# Patient Record
Sex: MALE | Race: WHITE | NOT HISPANIC OR LATINO | Employment: FULL TIME | URBAN - METROPOLITAN AREA
[De-identification: names, ages, dates, MRNs, and addresses within clinical notes are randomized per-mention and may not be internally consistent; named-entity substitution may affect disease eponyms.]

---

## 2018-10-21 ENCOUNTER — HOSPITAL ENCOUNTER (INPATIENT)
Facility: HOSPITAL | Age: 28
LOS: 3 days | Discharge: HOME | DRG: 897 | End: 2018-10-24
Attending: PSYCHIATRY & NEUROLOGY | Admitting: PSYCHIATRY & NEUROLOGY
Payer: COMMERCIAL

## 2018-10-21 ENCOUNTER — HOSPITAL ENCOUNTER (EMERGENCY)
Facility: OTHER | Age: 28
Discharge: PSYCHIATRIC OR SUBSTANCE USE FACILITY | End: 2018-10-21
Attending: EMERGENCY MEDICINE
Payer: COMMERCIAL

## 2018-10-21 VITALS
TEMPERATURE: 98 F | DIASTOLIC BLOOD PRESSURE: 70 MMHG | WEIGHT: 175 LBS | HEART RATE: 88 BPM | RESPIRATION RATE: 18 BRPM | HEIGHT: 73 IN | BODY MASS INDEX: 23.19 KG/M2 | OXYGEN SATURATION: 98 % | SYSTOLIC BLOOD PRESSURE: 120 MMHG

## 2018-10-21 DIAGNOSIS — F19.94 SUBSTANCE INDUCED MOOD DISORDER: ICD-10-CM

## 2018-10-21 DIAGNOSIS — F19.959 SUBSTANCE-INDUCED PSYCHOTIC DISORDER: ICD-10-CM

## 2018-10-21 DIAGNOSIS — F10.20 ALCOHOL USE DISORDER, MODERATE, IN CONTROLLED ENVIRONMENT: ICD-10-CM

## 2018-10-21 DIAGNOSIS — F41.9 ANXIETY: ICD-10-CM

## 2018-10-21 DIAGNOSIS — G47.00 INSOMNIA, UNSPECIFIED TYPE: ICD-10-CM

## 2018-10-21 DIAGNOSIS — F22 PARANOIA: Primary | ICD-10-CM

## 2018-10-21 DIAGNOSIS — B20 HIV DISEASE: Chronic | ICD-10-CM

## 2018-10-21 DIAGNOSIS — F31.9 BIPOLAR 1 DISORDER: ICD-10-CM

## 2018-10-21 DIAGNOSIS — F29 PSYCHOSIS: ICD-10-CM

## 2018-10-21 LAB
ALBUMIN SERPL BCP-MCNC: 4.5 G/DL
ALP SERPL-CCNC: 78 U/L
ALT SERPL W/O P-5'-P-CCNC: 22 U/L
AMPHET+METHAMPHET UR QL: NEGATIVE
ANION GAP SERPL CALC-SCNC: 6 MMOL/L
APAP SERPL-MCNC: <3 UG/ML
AST SERPL-CCNC: 15 U/L
BARBITURATES UR QL SCN>200 NG/ML: NEGATIVE
BASOPHILS # BLD AUTO: 0.02 K/UL
BASOPHILS NFR BLD: 0.4 %
BENZODIAZ UR QL SCN>200 NG/ML: NEGATIVE
BILIRUB SERPL-MCNC: 0.4 MG/DL
BILIRUB UR QL STRIP: NEGATIVE
BUN SERPL-MCNC: 17 MG/DL
BZE UR QL SCN: NEGATIVE
CALCIUM SERPL-MCNC: 10 MG/DL
CANNABINOIDS UR QL SCN: NEGATIVE
CHLORIDE SERPL-SCNC: 107 MMOL/L
CLARITY UR: CLEAR
CO2 SERPL-SCNC: 27 MMOL/L
COLOR UR: YELLOW
CREAT SERPL-MCNC: 1 MG/DL
CREAT UR-MCNC: 14.1 MG/DL
DIFFERENTIAL METHOD: ABNORMAL
EOSINOPHIL # BLD AUTO: 0.1 K/UL
EOSINOPHIL NFR BLD: 1.7 %
ERYTHROCYTE [DISTWIDTH] IN BLOOD BY AUTOMATED COUNT: 12.4 %
EST. GFR  (AFRICAN AMERICAN): >60 ML/MIN/1.73 M^2
EST. GFR  (NON AFRICAN AMERICAN): >60 ML/MIN/1.73 M^2
ETHANOL SERPL-MCNC: 133 MG/DL
ETHANOL SERPL-MCNC: 48 MG/DL
GLUCOSE SERPL-MCNC: 102 MG/DL
GLUCOSE UR QL STRIP: NEGATIVE
HCT VFR BLD AUTO: 43.8 %
HGB BLD-MCNC: 15.7 G/DL
HGB UR QL STRIP: NEGATIVE
KETONES UR QL STRIP: NEGATIVE
LEUKOCYTE ESTERASE UR QL STRIP: NEGATIVE
LYMPHOCYTES # BLD AUTO: 1.8 K/UL
LYMPHOCYTES NFR BLD: 33.6 %
MCH RBC QN AUTO: 32.2 PG
MCHC RBC AUTO-ENTMCNC: 35.8 G/DL
MCV RBC AUTO: 90 FL
METHADONE UR QL SCN>300 NG/ML: NEGATIVE
MONOCYTES # BLD AUTO: 0.7 K/UL
MONOCYTES NFR BLD: 12.1 %
NEUTROPHILS # BLD AUTO: 2.8 K/UL
NEUTROPHILS NFR BLD: 51.8 %
NITRITE UR QL STRIP: NEGATIVE
OPIATES UR QL SCN: NEGATIVE
PCP UR QL SCN>25 NG/ML: NEGATIVE
PH UR STRIP: 6 [PH] (ref 5–8)
PLATELET # BLD AUTO: 187 K/UL
PMV BLD AUTO: 9.4 FL
POTASSIUM SERPL-SCNC: 4 MMOL/L
PROT SERPL-MCNC: 7.6 G/DL
PROT UR QL STRIP: NEGATIVE
RBC # BLD AUTO: 4.87 M/UL
SALICYLATES SERPL-MCNC: <5 MG/DL
SODIUM SERPL-SCNC: 140 MMOL/L
SP GR UR STRIP: <=1.005 (ref 1–1.03)
TOXICOLOGY INFORMATION: ABNORMAL
TSH SERPL DL<=0.005 MIU/L-ACNC: 0.89 UIU/ML
URN SPEC COLLECT METH UR: ABNORMAL
UROBILINOGEN UR STRIP-ACNC: NEGATIVE EU/DL
WBC # BLD AUTO: 5.45 K/UL

## 2018-10-21 PROCEDURE — 85025 COMPLETE CBC W/AUTO DIFF WBC: CPT

## 2018-10-21 PROCEDURE — 25000003 PHARM REV CODE 250: Performed by: EMERGENCY MEDICINE

## 2018-10-21 PROCEDURE — 84443 ASSAY THYROID STIM HORMONE: CPT

## 2018-10-21 PROCEDURE — 93005 ELECTROCARDIOGRAM TRACING: CPT

## 2018-10-21 PROCEDURE — 80307 DRUG TEST PRSMV CHEM ANLYZR: CPT

## 2018-10-21 PROCEDURE — 99285 EMERGENCY DEPT VISIT HI MDM: CPT | Mod: 25

## 2018-10-21 PROCEDURE — 12400001 HC PSYCH SEMI-PRIVATE ROOM

## 2018-10-21 PROCEDURE — 80329 ANALGESICS NON-OPIOID 1 OR 2: CPT

## 2018-10-21 PROCEDURE — 81003 URINALYSIS AUTO W/O SCOPE: CPT | Mod: 59

## 2018-10-21 PROCEDURE — 80320 DRUG SCREEN QUANTALCOHOLS: CPT | Mod: 91

## 2018-10-21 PROCEDURE — 93010 ELECTROCARDIOGRAM REPORT: CPT | Mod: ,,, | Performed by: INTERNAL MEDICINE

## 2018-10-21 PROCEDURE — 80053 COMPREHEN METABOLIC PANEL: CPT

## 2018-10-21 RX ORDER — DIAZEPAM 5 MG/1
5 TABLET ORAL
Status: COMPLETED | OUTPATIENT
Start: 2018-10-21 | End: 2018-10-21

## 2018-10-21 RX ORDER — OLANZAPINE 10 MG/1
10 TABLET ORAL EVERY 6 HOURS PRN
Status: DISCONTINUED | OUTPATIENT
Start: 2018-10-21 | End: 2018-10-24 | Stop reason: HOSPADM

## 2018-10-21 RX ORDER — OLANZAPINE 10 MG/2ML
10 INJECTION, POWDER, FOR SOLUTION INTRAMUSCULAR EVERY 6 HOURS PRN
Status: DISCONTINUED | OUTPATIENT
Start: 2018-10-21 | End: 2018-10-24 | Stop reason: HOSPADM

## 2018-10-21 RX ORDER — DIPHENHYDRAMINE HCL 25 MG
50 CAPSULE ORAL
Status: COMPLETED | OUTPATIENT
Start: 2018-10-21 | End: 2018-10-21

## 2018-10-21 RX ORDER — NICOTINE 7MG/24HR
1 PATCH, TRANSDERMAL 24 HOURS TRANSDERMAL DAILY
Status: DISCONTINUED | OUTPATIENT
Start: 2018-10-22 | End: 2018-10-24 | Stop reason: HOSPADM

## 2018-10-21 RX ORDER — FOLIC ACID 1 MG/1
1 TABLET ORAL DAILY
Status: DISCONTINUED | OUTPATIENT
Start: 2018-10-22 | End: 2018-10-24 | Stop reason: HOSPADM

## 2018-10-21 RX ORDER — OLANZAPINE 10 MG/1
10 TABLET ORAL NIGHTLY
Status: ON HOLD | COMMUNITY
End: 2018-10-24 | Stop reason: HOSPADM

## 2018-10-21 RX ADMIN — DIAZEPAM 5 MG: 5 TABLET ORAL at 01:10

## 2018-10-21 RX ADMIN — DIAZEPAM 5 MG: 5 TABLET ORAL at 07:10

## 2018-10-21 RX ADMIN — DIPHENHYDRAMINE HYDROCHLORIDE 50 MG: 25 CAPSULE ORAL at 04:10

## 2018-10-21 NOTE — ED TRIAGE NOTES
Pt to ED stating he is having a panic attack. Also reports suicidal thoughts x 5 days. Denies any plan. Pt with flat affect, calm and cooperative.

## 2018-10-21 NOTE — ED NOTES
PT belongings:    Pair of jeans [employer] sunglasses  smartphone  Green velvet shirt  Black wallet with [place] ID, VISA, insurance and other misc cards  Pair of brown loafers  [employer] tabitha car key

## 2018-10-21 NOTE — ED NOTES
"Pt updated on POC and pt verbalized understanding. Pt appears to be anxious and states, " im just nervous." MD notified.  "

## 2018-10-21 NOTE — ED NOTES
Completed hourly rounding on pt. ED sitter Inna at bedside for direct patient observation performing q 15 min checks per psych protocol. Psych protocol continued, pt in paper scrubs and non-skid socks, no harmful objects in room, pt remains free from injury. Pt low fowlers in stretcher, resting with eyes closed, respirations unlabored with visible chest rise and fall, NAD noted. Bed locked and in low position, side rails x 2. WCTM.

## 2018-10-21 NOTE — ED NOTES
Pt remains in a safe in clutter free environment. Pt is AAOx4. RR are even and unlabored. Skin is warm and dry. Inna Mason, remains at the bedside with direct observation of the pt and performing q 15 min safety checks

## 2018-10-21 NOTE — ED NOTES
PEC received in CPT. Will begin actively seeking inpatient psych placement once pt is medically cleared

## 2018-10-21 NOTE — ED PROVIDER NOTES
"Encounter Date: 10/21/2018    SCRIBE #1 NOTE: I, Kevin Marin, am scribing for, and in the presence of, Dr. Arango.       History     Chief Complaint   Patient presents with    Suicidal     Pt states, "I'm having a panic attack for the past couple of days." Also reports being suicial. Taking Zyprexa at home.      Time seen by provider: 12:53 PM    This is a 28 y.o. male, with history of HIV, who presents with complaint of panic attacks starting five days ago. He reports that he has not left his home in five days because of fear of what will happen if he does leave. He also experiences palpitations and auditory hallucinations telling him not to leave his home. He reports that while at home, he tries his best to sleep. He has passive suicidal ideations, with no plan in place. He denies thought of overdose. He denies fever, chills, rhinorrhea, SOB, CP, abdominal pain, N/V/D, any urinary symptoms, back pain, headache, or weakness. He attempted suicide approximately 1.5 years ago, where he cut his wrists. He reports that he was hospitalized on 8-5-18 for similar symptoms, where he experienced uncontrollable thoughts and panic attacks. He denies any recent illness. He believes that his olanzapine has stopped working. He also takes Zyprexa. He has not taken any Xanax recently. He also takes medicine for HIV.     He states that he is scared to talk to anyone about his anxiety. He has a roommate who is never home. His parents live in [place], but they are currently out of town in [place]. He also has a friend in [place]. He last saw his psychiatrist, Dr. Rowley, in [place] a few weeks ago. He has not sought out resources in [place]. 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 He reports ETOH use last night, but no illicit drug use.          The history is provided by the patient.     Review of patient's allergies indicates:  No Known " Allergies  Past Medical History:   Diagnosis Date    Bipolar 1 disorder     PHU (generalized anxiety disorder)     Panic disorder      History reviewed. No pertinent surgical history.  No family history on file.  Social History     Tobacco Use    Smoking status: Current Some Day Smoker     Types: Cigarettes    Smokeless tobacco: Never Used   Substance Use Topics    Alcohol use: Yes     Comment: occasionally     Drug use: No     Review of Systems   Constitutional: Negative for chills and fever.   HENT: Negative for rhinorrhea.    Respiratory: Negative for shortness of breath.    Cardiovascular: Positive for palpitations. Negative for chest pain and leg swelling.   Gastrointestinal: Negative for abdominal pain, diarrhea, nausea and vomiting.   Genitourinary: Negative for decreased urine volume, difficulty urinating, dysuria, enuresis, frequency, hematuria and urgency.   Musculoskeletal: Negative for back pain.   Skin: Negative for color change, pallor and rash.   Neurological: Negative for weakness and headaches.   Psychiatric/Behavioral: Positive for hallucinations (Auditory) and suicidal ideas. Negative for self-injury. The patient is nervous/anxious.         Positive for paranoia.       Physical Exam     Initial Vitals [10/21/18 1223]   BP Pulse Resp Temp SpO2   136/75 (!) 113 18 97.9 °F (36.6 °C) 96 %      MAP       --         Physical Exam    Nursing note and vitals reviewed.  Constitutional: He appears well-developed and well-nourished. He is not diaphoretic. He appears distressed.   Anxious.   HENT:   Head: Normocephalic and atraumatic.   Eyes: Conjunctivae and EOM are normal. No scleral icterus.   Neck: Normal range of motion.   Cardiovascular: Regular rhythm and normal heart sounds. Exam reveals no gallop and no friction rub.    No murmur heard.  tachycardic   Pulmonary/Chest: No respiratory distress.   Musculoskeletal: Normal range of motion. He exhibits no edema.   Neurological: He is alert and  oriented to person, place, and time.   Skin: Skin is warm and dry. No rash noted. No erythema. No pallor.   Psychiatric: His speech is normal. His mood appears anxious. Thought content is paranoid. Cognition and memory are normal. He expresses impulsivity. He exhibits a depressed mood. He expresses suicidal ideation.   Paranoid. Positive auditory hallucinations. Passive thoughts of self harm, anxcious         ED Course   Procedures  Labs Reviewed   CBC W/ AUTO DIFFERENTIAL - Abnormal; Notable for the following components:       Result Value    MCH 32.2 (*)     All other components within normal limits   COMPREHENSIVE METABOLIC PANEL - Abnormal; Notable for the following components:    Anion Gap 6 (*)     All other components within normal limits   URINALYSIS, REFLEX TO URINE CULTURE - Abnormal; Notable for the following components:    Specific Gravity, UA <=1.005 (*)     All other components within normal limits    Narrative:     Preferred Collection Type->Urine, Clean Catch   DRUG SCREEN PANEL, URINE EMERGENCY - Abnormal; Notable for the following components:    Creatinine, Random Ur 14.1 (*)     All other components within normal limits    Narrative:     Preferred Collection Type->Urine, Clean Catch   ALCOHOL,MEDICAL (ETHANOL) - Abnormal; Notable for the following components:    Alcohol, Medical, Serum 133 (*)     All other components within normal limits   ACETAMINOPHEN LEVEL - Abnormal; Notable for the following components:    Acetaminophen (Tylenol), Serum <3.0 (*)     All other components within normal limits   SALICYLATE LEVEL - Abnormal; Notable for the following components:    Salicylate Lvl <5.0 (*)     All other components within normal limits   TSH     EKG Readings: (Independently Interpreted)   Normal sinus rhythm at 79 bpm. No STEMI. Normal axis.       Imaging Results    None          Medical Decision Making:   Initial Assessment:   29 yo M with unclear psych hx- on Zyprexa, w chronic anxiety, here  "with increasing paranoia, now avoiding leaving his house for last week, + auditory hallucinations, and thoughts of "doing anything to end it all". + palpitations, denies illicits substance use. No visual hallucinations. + hx of psych hospitalizations in the past. Currently denying having friends or family to confide in. I am concerned about pt's ability to care for oneself, now w disorganized paranoid behavior. Will plan to medically clear for psych transfer.   Clinical Tests:   Medical Tests: Ordered and Reviewed  ED Management:  Pt medically cleared, will PEC given concern for grave disability w concern for suicidal thoughts without safe discharge.            Scribe Attestation:   Scribe #1: I performed the above scribed service and the documentation accurately describes the services I performed. I attest to the accuracy of the note.    Attending Attestation:           Physician Attestation for Scribe:  Physician Attestation Statement for Scribe #1: I, Dr. Arango, reviewed documentation, as scribed by Kevin Marin in my presence, and it is both accurate and complete.                    Clinical Impression:     1. Paranoia    2. Anxiety            Disposition:   Disposition: Transferred  Condition: Serious                        Lynn Arango MD  10/21/18 1520    "

## 2018-10-21 NOTE — ED NOTES
Pt remains in a safe in clutter free environment. Pt is AAOx4. RR are even and unlabored. Skin is warm and dry. Inna Mason, remains at the bedside with direct observation of the pt and performing q 15 min safety checks.

## 2018-10-21 NOTE — ED NOTES
Pt has ETO is not < 100. Notified ED RN that pt must have ETO <100 in order to seek placement. Please re draw ETO level.

## 2018-10-21 NOTE — ED NOTES
Pt remains in a safe in clutter free environment. Pt appears to be less anxious than earlier.  Pt is AAOx4. RR are even and unlabored. Skin is warm and dry. Inna Mason, remains at the bedside with direct observation of the pt and performing q 15 min safety checks

## 2018-10-22 PROBLEM — B20 HIV DISEASE: Chronic | Status: ACTIVE | Noted: 2018-10-22

## 2018-10-22 PROBLEM — F10.20 ALCOHOL USE DISORDER, MODERATE, IN CONTROLLED ENVIRONMENT: Status: ACTIVE | Noted: 2018-10-22

## 2018-10-22 PROBLEM — G47.00 INSOMNIA: Status: ACTIVE | Noted: 2018-10-22

## 2018-10-22 PROBLEM — F31.9 BIPOLAR 1 DISORDER: Status: ACTIVE | Noted: 2018-10-22

## 2018-10-22 PROBLEM — Z21 HIV (HUMAN IMMUNODEFICIENCY VIRUS INFECTION): Chronic | Status: ACTIVE | Noted: 2018-10-22

## 2018-10-22 PROBLEM — F41.9 ANXIETY: Status: ACTIVE | Noted: 2018-10-22

## 2018-10-22 LAB
CHOLEST SERPL-MCNC: 227 MG/DL
CHOLEST/HDLC SERPL: 5.7 {RATIO}
ESTIMATED AVG GLUCOSE: 91 MG/DL
HBA1C MFR BLD HPLC: 4.8 %
HDLC SERPL-MCNC: 40 MG/DL
HDLC SERPL: 17.6 %
LDLC SERPL CALC-MCNC: 141.4 MG/DL
NONHDLC SERPL-MCNC: 187 MG/DL
TRIGL SERPL-MCNC: 228 MG/DL

## 2018-10-22 PROCEDURE — 25000003 PHARM REV CODE 250: Performed by: PSYCHIATRY & NEUROLOGY

## 2018-10-22 PROCEDURE — 83036 HEMOGLOBIN GLYCOSYLATED A1C: CPT

## 2018-10-22 PROCEDURE — 99223 1ST HOSP IP/OBS HIGH 75: CPT | Mod: ,,, | Performed by: PSYCHIATRY & NEUROLOGY

## 2018-10-22 PROCEDURE — 99223 1ST HOSP IP/OBS HIGH 75: CPT | Mod: ,,, | Performed by: INTERNAL MEDICINE

## 2018-10-22 PROCEDURE — 90853 GROUP PSYCHOTHERAPY: CPT | Mod: ,,, | Performed by: PSYCHOLOGIST

## 2018-10-22 PROCEDURE — 80061 LIPID PANEL: CPT

## 2018-10-22 PROCEDURE — 86361 T CELL ABSOLUTE COUNT: CPT

## 2018-10-22 PROCEDURE — 36415 COLL VENOUS BLD VENIPUNCTURE: CPT

## 2018-10-22 PROCEDURE — 86592 SYPHILIS TEST NON-TREP QUAL: CPT

## 2018-10-22 PROCEDURE — 25000003 PHARM REV CODE 250: Performed by: INTERNAL MEDICINE

## 2018-10-22 PROCEDURE — 87536 HIV-1 QUANT&REVRSE TRNSCRPJ: CPT

## 2018-10-22 PROCEDURE — S4991 NICOTINE PATCH NONLEGEND: HCPCS | Performed by: STUDENT IN AN ORGANIZED HEALTH CARE EDUCATION/TRAINING PROGRAM

## 2018-10-22 PROCEDURE — 25000003 PHARM REV CODE 250: Performed by: STUDENT IN AN ORGANIZED HEALTH CARE EDUCATION/TRAINING PROGRAM

## 2018-10-22 PROCEDURE — 12400001 HC PSYCH SEMI-PRIVATE ROOM

## 2018-10-22 RX ORDER — DARUNAVIR 800 MG/1
800 TABLET, FILM COATED ORAL
Status: DISCONTINUED | OUTPATIENT
Start: 2018-10-22 | End: 2018-10-24 | Stop reason: HOSPADM

## 2018-10-22 RX ORDER — RAMELTEON 8 MG/1
8 TABLET ORAL NIGHTLY PRN
Status: DISCONTINUED | OUTPATIENT
Start: 2018-10-22 | End: 2018-10-24 | Stop reason: HOSPADM

## 2018-10-22 RX ORDER — TENOFOVIR DISOPROXIL FUMARATE 300 MG/1
300 TABLET, FILM COATED ORAL DAILY
Status: DISCONTINUED | OUTPATIENT
Start: 2018-10-22 | End: 2018-10-24 | Stop reason: HOSPADM

## 2018-10-22 RX ORDER — DARUNAVIR 800 MG/1
800 TABLET, FILM COATED ORAL
Status: CANCELLED | OUTPATIENT
Start: 2018-10-23

## 2018-10-22 RX ORDER — MIRTAZAPINE 15 MG/1
15 TABLET, ORALLY DISINTEGRATING ORAL NIGHTLY
Status: DISCONTINUED | OUTPATIENT
Start: 2018-10-22 | End: 2018-10-24 | Stop reason: HOSPADM

## 2018-10-22 RX ORDER — RITONAVIR 100 MG/1
100 TABLET ORAL
Status: DISCONTINUED | OUTPATIENT
Start: 2018-10-22 | End: 2018-10-24 | Stop reason: HOSPADM

## 2018-10-22 RX ORDER — LORAZEPAM 1 MG/1
2 TABLET ORAL EVERY 4 HOURS PRN
Status: DISCONTINUED | OUTPATIENT
Start: 2018-10-22 | End: 2018-10-24 | Stop reason: HOSPADM

## 2018-10-22 RX ORDER — EMTRICITABINE 200 MG/1
200 CAPSULE ORAL DAILY
Status: DISCONTINUED | OUTPATIENT
Start: 2018-10-22 | End: 2018-10-24 | Stop reason: HOSPADM

## 2018-10-22 RX ADMIN — DARUNAVIR 800 MG: 800 TABLET, FILM COATED ORAL at 07:10

## 2018-10-22 RX ADMIN — RAMELTEON 8 MG: 8 TABLET, FILM COATED ORAL at 01:10

## 2018-10-22 RX ADMIN — EMTRICITABINE 200 MG: 200 CAPSULE ORAL at 07:10

## 2018-10-22 RX ADMIN — THERA TABS 1 TABLET: TAB at 09:10

## 2018-10-22 RX ADMIN — PREGABALIN 150 MG: 100 CAPSULE ORAL at 12:10

## 2018-10-22 RX ADMIN — RITONAVIR 100 MG: 100 TABLET, FILM COATED ORAL at 07:10

## 2018-10-22 RX ADMIN — PREGABALIN 150 MG: 100 CAPSULE ORAL at 08:10

## 2018-10-22 RX ADMIN — NICOTINE 1 PATCH: 7 PATCH, EXTENDED RELEASE TRANSDERMAL at 09:10

## 2018-10-22 RX ADMIN — OLANZAPINE 15 MG: 5 TABLET, FILM COATED ORAL at 08:10

## 2018-10-22 RX ADMIN — MIRTAZAPINE 15 MG: 15 TABLET, ORALLY DISINTEGRATING ORAL at 08:10

## 2018-10-22 RX ADMIN — FOLIC ACID 1 MG: 1 TABLET ORAL at 09:10

## 2018-10-22 RX ADMIN — TENOFOVIR DISOPROXIL FUMARATE 300 MG: 300 TABLET, FILM COATED ORAL at 08:10

## 2018-10-22 NOTE — CONSULTS
[facility]  Infectious Disease  Consult Note    Patient Name: Flako Pearce  MRN: [id number]  Admission Date: 10/21/2018  Hospital Length of Stay: 1 days  Attending Physician: Andrew Andesron MD  Primary Care Provider: Primary Doctor No     Isolation Status: No active isolations    Patient information was obtained from patient and ER records.      Consults  Assessment/Plan:     HIV (human immunodeficiency virus infection)    28M hx HIV admitted to psych for evaluation of anxiety. ID consulted to resume home HAART    Recommendations  - Have re-ordered emtricitabine and tenofovir  - restarted darunavir, cobisistat not available so will use ritonavir as boosting agent instead  - on discharge, patient should resume his home descovy and prezcobix   - If he plans to move to [place], recommend arranging follow up w/ Dr. Rogelio Jung [address] [place] [address] [address], [place] [address] - [phone]  - If patient stays in [place], he is welcome to follow up in our clinic for further HIV care  - will check CD4, VL and RPR + FTA while inpatient    ID will sign off, please call w/ questions         Thank you for your consult. I will sign off. Please contact us if you have any additional questions.    Radha Walker,   Infectious Disease  [facility]    Subjective:     Principal Problem: Bipolar 1 disorder    HPI: 28M PMH HIV and alcohol abuse who presented to ER w/ paranoia and worsening anxiety. He was admitted to psych for further evaluation. He endorses intermittent compliance w/ his medications, he does not currently have an HIV provider, and plans to move to [place] after leaving the hospital. He recently started drinking again.      Past Medical History:   Diagnosis Date    Bipolar 1 disorder     PHU (generalized anxiety disorder)     Panic disorder        History reviewed. No pertinent surgical history.    Review of patient's allergies indicates:  No Known  Allergies    Medications:  Medications Prior to Admission   Medication Sig    darunavir-cobicistat (PREZCOBIX) 800-150 mg-mg Tab Take by mouth.    emtricitabine-tenofovir alafen (DESCOVY) 200-25 mg Tab Take by mouth.    OLANZapine (ZYPREXA) 10 MG tablet Take 10 mg by mouth every evening.     Antibiotics (From admission, onward)    None        Antifungals (From admission, onward)    None        Antivirals (From admission, onward)        Stop Route Frequency     emtricitabine      -- Oral Daily     tenofovir      -- Oral Daily     darunavir ethanolate      -- Oral With breakfast     ritonavir      -- Oral With breakfast             There is no immunization history on file for this patient.    Family History     None        Social History     Socioeconomic History    Marital status: Single     Spouse name: None    Number of children: None    Years of education: None    Highest education level: None   Social Needs    Financial resource strain: None    Food insecurity - worry: None    Food insecurity - inability: None    Transportation needs - medical: None    Transportation needs - non-medical: None   Occupational History    None   Tobacco Use    Smoking status: Current Some Day Smoker     Types: Cigarettes    Smokeless tobacco: Never Used   Substance and Sexual Activity    Alcohol use: Yes     Comment: occasionally     Drug use: No    Sexual activity: None   Other Topics Concern    None   Social History Narrative    None     Review of Systems   Constitutional: Negative for activity change, appetite change, chills and fever.   HENT: Negative for congestion, dental problem, ear pain and rhinorrhea.    Eyes: Negative for pain and discharge.   Respiratory: Negative for cough and shortness of breath.    Cardiovascular: Negative for chest pain and leg swelling.   Gastrointestinal: Negative for abdominal distention, abdominal pain, constipation, diarrhea, nausea and vomiting.   Genitourinary: Negative for  difficulty urinating and dysuria.   Musculoskeletal: Negative for arthralgias, back pain and joint swelling.   Skin: Negative for rash and wound.   Neurological: Negative for dizziness, light-headedness and headaches.   Psychiatric/Behavioral: Negative for behavioral problems and confusion.     Objective:     Vital Signs (Most Recent):  Temp: 97.9 °F (36.6 °C) (10/22/18 0750)  Pulse: 92 (10/22/18 0750)  Resp: 18 (10/22/18 0750)  BP: 127/80 (10/22/18 0750)  SpO2: 98 % (10/22/18 0111) Vital Signs (24h Range):  Temp:  [97.9 °F (36.6 °C)-98.1 °F (36.7 °C)] 97.9 °F (36.6 °C)  Pulse:  [77-92] 92  Resp:  [18] 18  SpO2:  [98 %] 98 %  BP: (120-129)/(70-80) 127/80     Weight: 84.8 kg (186 lb 15.2 oz)  Body mass index is 24.67 kg/m².    Estimated Creatinine Clearance: 125.4 mL/min (based on SCr of 1 mg/dL).    Physical Exam   Constitutional: He is oriented to person, place, and time. He appears well-developed and well-nourished. No distress.   HENT:   Head: Atraumatic.   Right Ear: External ear normal.   Left Ear: External ear normal.   Nose: Nose normal.   Mouth/Throat: Oropharynx is clear and moist.   Eyes: EOM are normal.   Neck: Normal range of motion. Neck supple.   Cardiovascular: Normal rate, regular rhythm and normal heart sounds.   No murmur heard.  Pulmonary/Chest: Effort normal and breath sounds normal. No respiratory distress. He has no wheezes.   Abdominal: Soft. Bowel sounds are normal. He exhibits no distension. There is no tenderness.   Musculoskeletal: Normal range of motion. He exhibits no edema or deformity.   Neurological: He is alert and oriented to person, place, and time. No cranial nerve deficit.   Skin: Skin is warm and dry. No rash noted. He is not diaphoretic. No erythema.   Psychiatric: He has a normal mood and affect. His behavior is normal.       Significant Labs:   C4 Count: No results for input(s): C4 in the last 48 hours.  CBC:   Recent Labs   Lab 10/21/18  1322   WBC 5.45   HGB 15.7   HCT 43.8         CMP:   Recent Labs   Lab 10/21/18  1322      K 4.0      CO2 27      BUN 17   CREATININE 1.0   CALCIUM 10.0   PROT 7.6   ALBUMIN 4.5   BILITOT 0.4   ALKPHOS 78   AST 15   ALT 22   ANIONGAP 6*   EGFRNONAA >60       Significant Imaging: I have reviewed all pertinent imaging results/findings within the past 24 hours.

## 2018-10-22 NOTE — ED NOTES
Car information given to  Security at this time. Pt is parked in the [facility] 2nd floor

## 2018-10-22 NOTE — HPI
Inpatient Psychiatry History & Physical      Chief Complaint / Reason for Consult:     suicidal ideation and anxiety     Subjective:     History of Present Illness:   Flako Pearce is a 28 y.o. male with a history of panic attacks, bipolar I disorder, and PHU who presented to [facility] due to panic attacks, anxiety, SI, depression.     Per ED MD:  This is a 28 y.o. male, with history of HIV, who presents with complaint of panic attacks starting five days ago. He reports that he has not left his home in five days because of fear of what will happen if he does leave. He also experiences palpitations and auditory hallucinations telling him not to leave his home. He reports that while at home, he tries his best to sleep. He has passive suicidal ideations, with no plan in place. He denies thought of overdose. He denies fever, chills, rhinorrhea, SOB, CP, abdominal pain, N/V/D, any urinary symptoms, back pain, headache, or weakness. He attempted suicide approximately 1.5 years ago, where he cut his wrists. He reports that he was hospitalized on 8-5-18 for similar symptoms, where he experienced uncontrollable thoughts and panic attacks. He denies any recent illness. He believes that his olanzapine has stopped working. He also takes Zyprexa. He has not taken any Xanax recently. He also takes medicine for HIV.      He states that he is scared to talk to anyone about his anxiety. He has a roommate who is never home. His parents live in [place], but they are currently out of town in [place]. He also has a friend in [place]. He last saw his psychiatrist, Dr. Rowley, in [place] a few weeks ago. He has not sought out resources in [place]. 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 He reports ETOH use last night, but no illicit drug use.    Inpatient interview:  Pt originally came to the ED at [facility] for help with his anxiety and panic attacks.  Pt  last had a psychiatrist in [place], was on Klonopin daily and zyprexa 15 mg nightly.  Pt has been out of his benzos for months, self medicating with alcohol.  Pt states his last manic episode was three weeks ago, had decreased need for sleep, spent a lot of money on shopping.  Pt reports that he had auditory hallucinations a few months ago when he could not sleep, states the voices are his friends, does not currently endorse.  Pt denies current suicidality, states he felt that way earlier in the day due to the overwhelming anxiety.  Pt has had two previous suicide attempts, both by cutting his wrists. Pt currently complains of insomnia, no physical complaints.      Collateral:   none    Medical Review Of Systems:  Pertinent items are noted in HPI.    Psychiatric Review Of Systems - Is patient experiencing or having changes in:  sleep: yes  appetite: no  weight: no  energy/anergy: yes  interest/pleasure/anhedonia: yes  somatic symptoms: yes  libido: no  anxiety/panic: yes  guilty/hopelessness: yes  concentration: no  S.I.B.s/risky behavior: no  any drugs: no  alcohol: yes     Allergies:  Patient has no known allergies.    Past Medical/Surgical History  Past Medical History:   Diagnosis Date    Bipolar 1 disorder     PHU (generalized anxiety disorder)     Panic disorder      History reviewed. No pertinent surgical history.    Past Psychiatric History:  Previous Medication Trials: yes   Previous Psychiatric Hospitalizations: yes   Previous Suicide Attempts: yes x 2, most recent 1.5 years ago  History of Violence: no  Outpatient Psychiatrist: no, not currently in [place]    Social History:  Marital Status: not   Children: 0   Employment Status/Info: recently quit his job, plans to move back in with his parents  Education: college graduate  Special Ed: no  Housing Status: in apartment with friend  History of phys/sexual abuse: no  Access to gun: unknown    Substance Abuse History:  Recreational Drugs:  denies  Use of Alcohol: moderate  Rehab History: no   Tobacco Use: no  Use of Caffeine: denies use  Use of OTC: denies  Legal consequences of chemical use: no    Legal History:  Past Charges/Incarcerations: no,     Pending charges: no     Family Psychiatric History:   denies    Objective:     Current Medications:  Infusions:    Scheduled:   folic acid  1 mg Oral Daily    multivitamin  1 tablet Oral Daily    nicotine  1 patch Transdermal Daily     PRN:  OLANZapine **AND** OLANZapine, ramelteon    Home Medications:  Prior to Admission medications    Medication Sig Start Date End Date Taking? Authorizing Provider   darunavir-cobicistat (PREZCOBIX) 800-150 mg-mg Tab Take by mouth.    Historical Provider, MD   emtricitabine-tenofovir alafen (DESCOVY) 200-25 mg Tab Take by mouth.    Historical Provider, MD   OLANZapine (ZYPREXA) 10 MG tablet Take 10 mg by mouth every evening.    Historical Provider, MD     Vital Signs:  Temp:  [97.9 °F (36.6 °C)-98.1 °F (36.7 °C)]   Pulse:  []   Resp:  [15-18]   BP: (120-136)/(70-75)   SpO2:  [96 %-98 %]     Physical Exam:  Gen: Alert, calm, cooperative, NAD   Head: NCAT, PERRL, EOMI, MMM   Lungs: CTAB, respirations unlabored   Chest wall: No tenderness or deformity   Heart: RRR, S1/S2 normal, no M/R/G   Abdomen: S/NT/ND, +BS, no HSM, no masses   Extremities: Extremities normal, atraumatic, no cyanosis or edema   Pulses: 2+ and symmetric all extremities   Skin: Skin color, texture, turgor normal; no rashes or lesions   Neurologic: CN II-XII grossly intact, normal strength, sensations and reflexes throughout     Mental Status Exam:  Appearance: unremarkable, age appropriate   Behavior: normal, cooperative, restless and fidgety    Speech/Language: normal tone, normal rate, normal pitch, normal volume   Mood: euthymic   Affect: mood congruent   Thought Process:  normal and logical   Thought Content: normal, no suicidality, no homicidality, delusions, or paranoia   Orientation:  grossly intact   Cognition: grossly intact   Insight: fair   Judgment: fair

## 2018-10-22 NOTE — CONSULTS
Consult received. Full note to follow. Please call for questions.    Thanks,    Blanca Walker DO  Infectious Disease Fellow  P: [phone]

## 2018-10-22 NOTE — PLAN OF CARE
10/22/18 1500   [facility]   Group Name Education   Specific Interventions Coping Skills Training   Participation Level Minimal;Sharing   Participation Quality Cooperative;Lack of Interest   Insight/Motivation Limited   Affect/Mood Display Appropriate   Cognition Oriented

## 2018-10-22 NOTE — ED NOTES
Pt resting in bed comfortably at this time eyes closed and respirations even and unlabored, bathroom needs addressed and comfort situated. PEC in place and one to one direct observation maintained with Claritza LICONA at this time. Pt medically cleared and awaiting transport to [facility] via [facility] . Will be available for pt's needs and will continue to monitor pt.

## 2018-10-22 NOTE — SUBJECTIVE & OBJECTIVE
Patient History           Medical as of 10/22/2018     Past Medical History     Diagnosis Date Comments Source    Bipolar 1 disorder -- -- Patient    PHU (generalized anxiety disorder) -- -- Patient    Panic disorder -- -- Patient                  Surgical as of 10/22/2018    Past Surgical History: Patient provided no pertinent surgical history.           Family as of 10/22/2018    None           Tobacco Use as of 10/22/2018     Smoking Status Smoking Start Date Smoking Quit Date Packs/Day Years Used    Current Some Day Smoker -- -- -- --    Types Comments Smokeless Tobacco Status Smokeless Tobacco Quit Date Source     Cigarettes -- Never Used -- Provider            Alcohol Use as of 10/22/2018     Alcohol Use Drinks/Week Alcohol/Week Comments Source    Yes -- -- occasionally  Provider    Frequency Standard Drinks Binge Drinking Source      -- -- -- Provider             Drug Use as of 10/22/2018     Drug Use Types Frequency Comments Source    No -- -- -- Provider            Sexual Activity as of 10/22/2018     Sexually Active Birth Control Partners Comments Source    -- -- -- -- Provider            Activities of Daily Living as of 10/22/2018    None           Social Documentation as of 10/22/2018    None           Occupational as of 10/22/2018    None           Socioeconomic as of 10/22/2018     Marital Status Spouse Name Number of Children Years Education Education Level Preferred Language Ethnicity Race Source    Single -- -- -- -- English /White White --    Financial Resource Strain Food Insecurity: Worry Food Insecurity: Inability Transportation Needs: Medical Transportation Needs: Non-medical       -- -- -- -- --             Pertinent History     Question Response Comments    Lives with -- --    Place in Birth Order -- --    Lives in -- --    Number of Siblings -- --    Raised by -- --    Legal Involvement -- --    Childhood Trauma -- --    Criminal History of -- --    Financial Status -- --     Highest Level of Education -- --    Does patient have access to a firearm? -- --     Service -- --    Primary Leisure Activity -- --    Spirituality -- --        Past Medical History:   Diagnosis Date    Bipolar 1 disorder     PHU (generalized anxiety disorder)     Panic disorder      History reviewed. No pertinent surgical history.  Family History     None        Tobacco Use    Smoking status: Current Some Day Smoker     Types: Cigarettes    Smokeless tobacco: Never Used   Substance and Sexual Activity    Alcohol use: Yes     Comment: occasionally     Drug use: No    Sexual activity: Not on file     Review of patient's allergies indicates:  No Known Allergies    Current Facility-Administered Medications on File Prior to Encounter   Medication    [COMPLETED] diazePAM tablet 5 mg    [COMPLETED] diazePAM tablet 5 mg    [COMPLETED] diphenhydrAMINE capsule 50 mg     Current Outpatient Medications on File Prior to Encounter   Medication Sig    darunavir-cobicistat (PREZCOBIX) 800-150 mg-mg Tab Take by mouth.    emtricitabine-tenofovir alafen (DESCOVY) 200-25 mg Tab Take by mouth.    OLANZapine (ZYPREXA) 10 MG tablet Take 10 mg by mouth every evening.     Psychotherapeutics (From admission, onward)    Start     Stop Route Frequency Ordered    10/22/18 0209  ramelteon tablet 8 mg      -- Oral Nightly PRN 10/22/18 0109    10/21/18 2333  OLANZapine tablet 10 mg  (Olanzapine)      -- Oral Every 6 hours PRN 10/21/18 2333    10/21/18 2333  OLANZapine injection 10 mg  (Olanzapine)      -- IM Every 6 hours PRN 10/21/18 2333        Review of Systems  Strengths and Liabilities: Strength: Patient is expressive/articulate., Strength: Patient is intelligent., Liability: Patient is impulsive., Liability: Patient has poor judgment    Objective:     Vital Signs (Most Recent):    Vital Signs (24h Range):  Temp:  [97.9 °F (36.6 °C)-98.1 °F (36.7 °C)] 98.1 °F (36.7 °C)  Pulse:  [] 88  Resp:  [15-18] 18  SpO2:   [96 %-98 %] 98 %  BP: (120-136)/(70-75) 120/70           There is no height or weight on file to calculate BMI.    No intake or output data in the 24 hours ending 10/22/18 0134    Physical Exam   Psychiatric:   Appearance: unremarkable, age appropriate  Behavior: normal, cooperative, restless and fidgety   Speech/Language: normal tone, normal rate, normal pitch, normal volume  Mood: euthymic  Affect: mood congruent  Thought Process:  normal and logical  Thought Content: normal, no suicidality, no homicidality, delusions, or paranoia  Orientation: grossly intact  Cognition: grossly intact  Insight: fair  Judgment: fair          Significant Labs:   Last 24 Hours:   Recent Lab Results       10/21/18  1649   10/21/18  1322        Benzodiazepines   Negative     Methadone metabolites   Negative     Phencyclidine   Negative     Acetaminophen (Tylenol), Serum   <3.0  Comment:  Toxic Levels:  Adults (4 hr post-ingestion).........>150 ug/mL  Adults (12 hr post-ingestion)........>40 ug/mL  Peds (2 hr post-ingestion, liquid)...>225 ug/mL       Albumin   4.5     Alcohol, Medical, Serum 48 133     Alkaline Phosphatase   78     ALT   22     Amphetamine Screen, Ur   Negative     Anion Gap   6     Appearance, UA   Clear     AST   15     Barbiturate Screen, Ur   Negative     Baso #   0.02     Basophil%   0.4     Bilirubin (UA)   Negative     Total Bilirubin   0.4  Comment:  For infants and newborns, interpretation of results should be based  on gestational age, weight and in agreement with clinical  observations.  Premature Infant recommended reference ranges:  Up to 24 hours.............<8.0 mg/dL  Up to 48 hours............<12.0 mg/dL  3-5 days..................<15.0 mg/dL  6-29 days.................<15.0 mg/dL       BUN, Bld   17     Calcium   10.0     Chloride   107     CO2   27     Cocaine (Metab.)   Negative     Color, UA   Yellow     Creatinine   1.0     Creatinine, Random Ur   14.1  Comment:  The random urine reference ranges  provided were established   for 24 hour urine collections.  No reference ranges exist for  random urine specimens.  Correlate clinically.       Differential Method   Automated     eGFR if    >60     eGFR if non    >60  Comment:  Calculation used to obtain the estimated glomerular filtration  rate (eGFR) is the CKD-EPI equation.        Eos #   0.1     Eosinophil%   1.7     Glucose   102     Glucose, UA   Negative     Gran # (ANC)   2.8     Gran%   51.8     Hematocrit   43.8     Hemoglobin   15.7     Ketones, UA   Negative     Leukocytes, UA   Negative     Lymph #   1.8     Lymph%   33.6     MCH   32.2     MCHC   35.8     MCV   90     Mono #   0.7     Mono%   12.1     MPV   9.4     Nitrite, UA   Negative     Occult Blood UA   Negative     Opiate Scrn, Ur   Negative     pH, UA   6.0     Platelets   187     Potassium   4.0     Total Protein   7.6     Protein, UA   Negative  Comment:  Recommend a 24 hour urine protein or a urine   protein/creatinine ratio if globulin induced proteinuria is  clinically suspected.       RBC   4.87     RDW   12.4     Salicylate Lvl   <5.0  Comment:  Toxic:  30.0 - 70.0 mg/dl  Lethal: >70.0 mg/dl       Sodium   140     Specific Gravity, UA   <=1.005     Specimen UA   Urine, Clean Catch     Marijuana (THC) Metabolite   Negative     Toxicology Information   SEE COMMENT  Comment:  This screen includes the following classes of drugs at the   listed cut-off:  Benzodiazepines                  200 ng/ml  Methadone                        300 ng/ml  Cocaine metabolite               300 ng/ml  Opiates                          300 ng/ml  Barbiturates                     200 ng/ml  Amphetamines                    1000 ng/ml  Marijuana metabs (THC)            50 ng/ml  Phencyclidine (PCP)               25 ng/ml  High concentrations of Diphenhydramine may cross-react with  Phencyclidine PCP screening immunoassay giving a false   positive result.  High concentrations of  Methylenedioxymethamphetamine (MDMA aka  Ectasy) and other structurally similar compounds may cross-   react with the Amphetamine/Methamphetamine screening   immunoassay giving a false positive result.  A metabolite of the anti-HIV drug Sustiva () may cause  false positive results in the Marijuana metabolite (THC)   screening assay.  Note: This exception list includes only more common   interferants in toxicology screen testing.  Because of many   cross-reactantspositive results on toxicology drug screens   should be confirmed whenever results do not correlate with   clinical presentation.  This report is intended for use in clinical monitoring and  management of patients. It is not intended for use in   employment related drug testing.  Because of any cross-reactants, positive results on toxicology  drug screens should be confirmed whenever results do not  correlate with clinical presentation.  Presumptive positive results are unconfirmed and may be used   only for medical purposes.       TSH   0.891     Urobilinogen, UA   Negative     WBC   5.45           Significant Imaging: None

## 2018-10-22 NOTE — PROGRESS NOTES
Group Psychotherapy (PhD/LCSW)    Site: [facility]    Clinical status of patient: Inpatient    Date: 10/22/2018    Group Focus: Life Skills    Length of service: 53702 - 35-40 minutes    Number of patients in attendance: 8    Referred by: Acute Psychiatry Unit Treatment Team    Target symptoms: Alcohol Abuse, Anxiety and Mood Disorder    Patient's response to treatment: Minimal participation     Progress toward goals: Minimal progress    Interval History: Pt attended for entire time, but didn't actively participate. He appeared sad and inattentive.     Diagnosis: Bipolar d/o, Anxiety, Alcohol dependence    Plan: Continue treatment on APU

## 2018-10-22 NOTE — ASSESSMENT & PLAN NOTE
28M hx HIV admitted to psych for evaluation of anxiety. ID consulted to resume home HAART    Recommendations  - Have re-ordered emtricitabine and tenofovir  - restarted darunavir, cobisistat not available so will use ritonavir as boosting agent instead  - on discharge, patient should resume his home descovy and prezcobix   - If he plans to move to [place], recommend arranging follow up w/ Dr. Rogelio Jung [address] [place] [address] [address], [place] [address] - [phone]  - If patient stays in [place], he is welcome to follow up in our clinic for further HIV care  - will check CD4, VL and RPR + FTA while inpatient    ID will sign off, please call w/ questions

## 2018-10-22 NOTE — ASSESSMENT & PLAN NOTE
- pt endorses panic attacks and PHU  - states only things that work are benzos, was previously on ativan and klonopin, has been out of klonopin for 3 months  - pt has been self medicating with etoh  - defer meds to primary team

## 2018-10-22 NOTE — ED NOTES
Patient accepted to [facility] . Accepting Md is Dr. Go. Call report to [phone] when admit orders completed.

## 2018-10-22 NOTE — H&P
[facility]  Psychiatry  History & Physical    Patient Name: Flako Pearce  MRN: [id number]   Code Status: Full Code  Admission Date: 10/21/2018  Attending Physician: Daniel Go MD   Primary Care Provider: Primary Doctor No    Current Legal Status: [facility]    Patient information was obtained from patient and ER records.     Subjective:     Principal Problem:Bipolar 1 disorder    Chief Complaint:  Panic attacks     HPI:   Inpatient Psychiatry History & Physical      Chief Complaint / Reason for Consult:     suicidal ideation and anxiety     Subjective:     History of Present Illness:   Flako Pearce is a 28 y.o. male with a history of panic attacks, bipolar I disorder, and PHU who presented to [facility] due to panic attacks, anxiety, SI, depression.     Per ED MD:  This is a 28 y.o. male, with history of HIV, who presents with complaint of panic attacks starting five days ago. He reports that he has not left his home in five days because of fear of what will happen if he does leave. He also experiences palpitations and auditory hallucinations telling him not to leave his home. He reports that while at home, he tries his best to sleep. He has passive suicidal ideations, with no plan in place. He denies thought of overdose. He denies fever, chills, rhinorrhea, SOB, CP, abdominal pain, N/V/D, any urinary symptoms, back pain, headache, or weakness. He attempted suicide approximately 1.5 years ago, where he cut his wrists. He reports that he was hospitalized on 8-5-18 for similar symptoms, where he experienced uncontrollable thoughts and panic attacks. He denies any recent illness. He believes that his olanzapine has stopped working. He also takes Zyprexa. He has not taken any Xanax recently. He also takes medicine for HIV.      He states that he is scared to talk to anyone about his anxiety. He has a roommate who is never home. His parents live in [place], but they are currently out of town in  [place]. He also has a friend in [place]. He last saw his psychiatrist, Dr. Rowley, in [place] a few weeks ago. He has not sought out resources in [place]. 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 He reports ETOH use last night, but no illicit drug use.    Inpatient interview:  Pt originally came to the ED at [facility] for help with his anxiety and panic attacks.  Pt last had a psychiatrist in [place], was on Klonopin daily and zyprexa 15 mg nightly.  Pt has been out of his benzos for months, self medicating with alcohol.  Pt states his last manic episode was three weeks ago, had decreased need for sleep, spent a lot of money on shopping.  Pt reports that he had auditory hallucinations a few months ago when he could not sleep, states the voices are his friends, does not currently endorse.  Pt denies current suicidality, states he felt that way earlier in the day due to the overwhelming anxiety.  Pt has had two previous suicide attempts, both by cutting his wrists. Pt currently complains of insomnia, no physical complaints.      Collateral:   none    Medical Review Of Systems:  Pertinent items are noted in HPI.    Psychiatric Review Of Systems - Is patient experiencing or having changes in:  sleep: yes  appetite: no  weight: no  energy/anergy: yes  interest/pleasure/anhedonia: yes  somatic symptoms: yes  libido: no  anxiety/panic: yes  guilty/hopelessness: yes  concentration: no  S.I.B.s/risky behavior: no  any drugs: no  alcohol: yes     Allergies:  Patient has no known allergies.    Past Medical/Surgical History  Past Medical History:   Diagnosis Date    Bipolar 1 disorder     PHU (generalized anxiety disorder)     Panic disorder      History reviewed. No pertinent surgical history.    Past Psychiatric History:  Previous Medication Trials: yes   Previous Psychiatric Hospitalizations: yes   Previous Suicide Attempts: yes x 2, most  recent 1.5 years ago  History of Violence: no  Outpatient Psychiatrist: no, not currently in [place]    Social History:  Marital Status: not   Children: 0   Employment Status/Info: recently quit his job, plans to move back in with his parents  Education: college graduate  Special Ed: no  Housing Status: in apartment with friend  History of phys/sexual abuse: no  Access to gun: unknown    Substance Abuse History:  Recreational Drugs: denies  Use of Alcohol: moderate  Rehab History: no   Tobacco Use: no  Use of Caffeine: denies use  Use of OTC: denies  Legal consequences of chemical use: no    Legal History:  Past Charges/Incarcerations: no,     Pending charges: no     Family Psychiatric History:   denies    Objective:     Current Medications:  Infusions:    Scheduled:   folic acid  1 mg Oral Daily    multivitamin  1 tablet Oral Daily    nicotine  1 patch Transdermal Daily     PRN:  OLANZapine **AND** OLANZapine, ramelteon    Home Medications:  Prior to Admission medications    Medication Sig Start Date End Date Taking? Authorizing Provider   darunavir-cobicistat (PREZCOBIX) 800-150 mg-mg Tab Take by mouth.    Historical Provider, MD   emtricitabine-tenofovir alafen (DESCOVY) 200-25 mg Tab Take by mouth.    Historical Provider, MD   OLANZapine (ZYPREXA) 10 MG tablet Take 10 mg by mouth every evening.    Historical Provider, MD     Vital Signs:  Temp:  [97.9 °F (36.6 °C)-98.1 °F (36.7 °C)]   Pulse:  []   Resp:  [15-18]   BP: (120-136)/(70-75)   SpO2:  [96 %-98 %]     Physical Exam:  Gen: Alert, calm, cooperative, NAD   Head: NCAT, PERRL, EOMI, MMM   Lungs: CTAB, respirations unlabored   Chest wall: No tenderness or deformity   Heart: RRR, S1/S2 normal, no M/R/G   Abdomen: S/NT/ND, +BS, no HSM, no masses   Extremities: Extremities normal, atraumatic, no cyanosis or edema   Pulses: 2+ and symmetric all extremities   Skin: Skin color, texture, turgor normal; no rashes or lesions   Neurologic: CN II-XII  grossly intact, normal strength, sensations and reflexes throughout     Mental Status Exam:  Appearance: unremarkable, age appropriate   Behavior: normal, cooperative, restless and fidgety    Speech/Language: normal tone, normal rate, normal pitch, normal volume   Mood: euthymic   Affect: mood congruent   Thought Process:  normal and logical   Thought Content: normal, no suicidality, no homicidality, delusions, or paranoia   Orientation: grossly intact   Cognition: grossly intact   Insight: fair   Judgment: fair            Patient History           Medical as of 10/22/2018     Past Medical History     Diagnosis Date Comments Source    Bipolar 1 disorder -- -- Patient    PHU (generalized anxiety disorder) -- -- Patient    Panic disorder -- -- Patient                  Surgical as of 10/22/2018    Past Surgical History: Patient provided no pertinent surgical history.           Family as of 10/22/2018    None           Tobacco Use as of 10/22/2018     Smoking Status Smoking Start Date Smoking Quit Date Packs/Day Years Used    Current Some Day Smoker -- -- -- --    Types Comments Smokeless Tobacco Status Smokeless Tobacco Quit Date Source     Cigarettes -- Never Used -- Provider            Alcohol Use as of 10/22/2018     Alcohol Use Drinks/Week Alcohol/Week Comments Source    Yes -- -- occasionally  Provider    Frequency Standard Drinks Binge Drinking Source      -- -- -- Provider             Drug Use as of 10/22/2018     Drug Use Types Frequency Comments Source    No -- -- -- Provider            Sexual Activity as of 10/22/2018     Sexually Active Birth Control Partners Comments Source    -- -- -- -- Provider            Activities of Daily Living as of 10/22/2018    None           Social Documentation as of 10/22/2018    None           Occupational as of 10/22/2018    None           Socioeconomic as of 10/22/2018     Marital Status Spouse Name Number of Children Years Education Education Level Preferred Language  Ethnicity Race Source    Single -- -- -- -- English /White White --    Financial Resource Strain Food Insecurity: Worry Food Insecurity: Inability Transportation Needs: Medical Transportation Needs: Non-medical       -- -- -- -- --             Pertinent History     Question Response Comments    Lives with -- --    Place in Birth Order -- --    Lives in -- --    Number of Siblings -- --    Raised by -- --    Legal Involvement -- --    Childhood Trauma -- --    Criminal History of -- --    Financial Status -- --    Highest Level of Education -- --    Does patient have access to a firearm? -- --     Service -- --    Primary Leisure Activity -- --    Spirituality -- --        Past Medical History:   Diagnosis Date    Bipolar 1 disorder     PHU (generalized anxiety disorder)     Panic disorder      History reviewed. No pertinent surgical history.  Family History     None        Tobacco Use    Smoking status: Current Some Day Smoker     Types: Cigarettes    Smokeless tobacco: Never Used   Substance and Sexual Activity    Alcohol use: Yes     Comment: occasionally     Drug use: No    Sexual activity: Not on file     Review of patient's allergies indicates:  No Known Allergies    Current Facility-Administered Medications on File Prior to Encounter   Medication    [COMPLETED] diazePAM tablet 5 mg    [COMPLETED] diazePAM tablet 5 mg    [COMPLETED] diphenhydrAMINE capsule 50 mg     Current Outpatient Medications on File Prior to Encounter   Medication Sig    darunavir-cobicistat (PREZCOBIX) 800-150 mg-mg Tab Take by mouth.    emtricitabine-tenofovir alafen (DESCOVY) 200-25 mg Tab Take by mouth.    OLANZapine (ZYPREXA) 10 MG tablet Take 10 mg by mouth every evening.     Psychotherapeutics (From admission, onward)    Start     Stop Route Frequency Ordered    10/22/18 0209  ramelteon tablet 8 mg      -- Oral Nightly PRN 10/22/18 0109    10/21/18 0531  OLANZapine tablet 10 mg  (Olanzapine)      --  Oral Every 6 hours PRN 10/21/18 2333    10/21/18 2333  OLANZapine injection 10 mg  (Olanzapine)      -- IM Every 6 hours PRN 10/21/18 2333        Review of Systems  Strengths and Liabilities: Strength: Patient is expressive/articulate., Strength: Patient is intelligent., Liability: Patient is impulsive., Liability: Patient has poor judgment    Objective:     Vital Signs (Most Recent):    Vital Signs (24h Range):  Temp:  [97.9 °F (36.6 °C)-98.1 °F (36.7 °C)] 98.1 °F (36.7 °C)  Pulse:  [] 88  Resp:  [15-18] 18  SpO2:  [96 %-98 %] 98 %  BP: (120-136)/(70-75) 120/70           There is no height or weight on file to calculate BMI.    No intake or output data in the 24 hours ending 10/22/18 0134    Physical Exam   Psychiatric:   Appearance: unremarkable, age appropriate  Behavior: normal, cooperative, restless and fidgety   Speech/Language: normal tone, normal rate, normal pitch, normal volume  Mood: euthymic  Affect: mood congruent  Thought Process:  normal and logical  Thought Content: normal, no suicidality, no homicidality, delusions, or paranoia  Orientation: grossly intact  Cognition: grossly intact  Insight: fair  Judgment: fair          Significant Labs:   Last 24 Hours:   Recent Lab Results       10/21/18  1649   10/21/18  1322        Benzodiazepines   Negative     Methadone metabolites   Negative     Phencyclidine   Negative     Acetaminophen (Tylenol), Serum   <3.0  Comment:  Toxic Levels:  Adults (4 hr post-ingestion).........>150 ug/mL  Adults (12 hr post-ingestion)........>40 ug/mL  Peds (2 hr post-ingestion, liquid)...>225 ug/mL       Albumin   4.5     Alcohol, Medical, Serum 48 133     Alkaline Phosphatase   78     ALT   22     Amphetamine Screen, Ur   Negative     Anion Gap   6     Appearance, UA   Clear     AST   15     Barbiturate Screen, Ur   Negative     Baso #   0.02     Basophil%   0.4     Bilirubin (UA)   Negative     Total Bilirubin   0.4  Comment:  For infants and newborns, interpretation  of results should be based  on gestational age, weight and in agreement with clinical  observations.  Premature Infant recommended reference ranges:  Up to 24 hours.............<8.0 mg/dL  Up to 48 hours............<12.0 mg/dL  3-5 days..................<15.0 mg/dL  6-29 days.................<15.0 mg/dL       BUN, Bld   17     Calcium   10.0     Chloride   107     CO2   27     Cocaine (Metab.)   Negative     Color, UA   Yellow     Creatinine   1.0     Creatinine, Random Ur   14.1  Comment:  The random urine reference ranges provided were established   for 24 hour urine collections.  No reference ranges exist for  random urine specimens.  Correlate clinically.       Differential Method   Automated     eGFR if    >60     eGFR if non    >60  Comment:  Calculation used to obtain the estimated glomerular filtration  rate (eGFR) is the CKD-EPI equation.        Eos #   0.1     Eosinophil%   1.7     Glucose   102     Glucose, UA   Negative     Gran # (ANC)   2.8     Gran%   51.8     Hematocrit   43.8     Hemoglobin   15.7     Ketones, UA   Negative     Leukocytes, UA   Negative     Lymph #   1.8     Lymph%   33.6     MCH   32.2     MCHC   35.8     MCV   90     Mono #   0.7     Mono%   12.1     MPV   9.4     Nitrite, UA   Negative     Occult Blood UA   Negative     Opiate Scrn, Ur   Negative     pH, UA   6.0     Platelets   187     Potassium   4.0     Total Protein   7.6     Protein, UA   Negative  Comment:  Recommend a 24 hour urine protein or a urine   protein/creatinine ratio if globulin induced proteinuria is  clinically suspected.       RBC   4.87     RDW   12.4     Salicylate Lvl   <5.0  Comment:  Toxic:  30.0 - 70.0 mg/dl  Lethal: >70.0 mg/dl       Sodium   140     Specific Gravity, UA   <=1.005     Specimen UA   Urine, Clean Catch     Marijuana (THC) Metabolite   Negative     Toxicology Information   SEE COMMENT  Comment:  This screen includes the following classes of drugs at the    listed cut-off:  Benzodiazepines                  200 ng/ml  Methadone                        300 ng/ml  Cocaine metabolite               300 ng/ml  Opiates                          300 ng/ml  Barbiturates                     200 ng/ml  Amphetamines                    1000 ng/ml  Marijuana metabs (THC)            50 ng/ml  Phencyclidine (PCP)               25 ng/ml  High concentrations of Diphenhydramine may cross-react with  Phencyclidine PCP screening immunoassay giving a false   positive result.  High concentrations of Methylenedioxymethamphetamine (MDMA aka  Ectasy) and other structurally similar compounds may cross-   react with the Amphetamine/Methamphetamine screening   immunoassay giving a false positive result.  A metabolite of the anti-HIV drug Sustiva () may cause  false positive results in the Marijuana metabolite (THC)   screening assay.  Note: This exception list includes only more common   interferants in toxicology screen testing.  Because of many   cross-reactantspositive results on toxicology drug screens   should be confirmed whenever results do not correlate with   clinical presentation.  This report is intended for use in clinical monitoring and  management of patients. It is not intended for use in   employment related drug testing.  Because of any cross-reactants, positive results on toxicology  drug screens should be confirmed whenever results do not  correlate with clinical presentation.  Presumptive positive results are unconfirmed and may be used   only for medical purposes.       TSH   0.891     Urobilinogen, UA   Negative     WBC   5.45           Significant Imaging: None    Assessment/Plan:     * Bipolar 1 disorder    - endorses last manic episode was 3 weeks ago, currently depressed and anxious  - pt states he is med compliant on zyprexa 15 mg nightly  - pt would likely benefit from a mood stabilizer     Insomnia    - pt complains of persistent insomnia  - Melatonin and  vistaril don't work, trazodone and seroquel make him a zombie, only lunesta and ambien work according to the pt.  - has not tried ramelteon, started ramelteon 8 mg prn nightly     Alcohol use disorder, moderate, in controlled environment    - pt had ISMAEL of 133 when he presented to the ED, pt self medicates anxiety  - no history of complicated withdrawal, VSS, pt is not tremulous, states he is not a daily drinker  - started Ativan 2 mg PO prn with withdrawal parameters         Anxiety    - pt endorses panic attacks and PHU  - states only things that work are benzos, was previously on ativan and klonopin, has been out of klonopin for 3 months  - pt has been self medicating with etoh  - defer meds to primary team         Estimated Discharge Date:   Initial Discharge Plan: Home    Prognosis: Fair    Need for Continued Hospitalization:   Psychiatric illness continues to pose a potential threat to life or bodily function, of self or others, thereby requiring the need for continued inpatient psychiatric hospitalization.    Total Time: 50 with greater than 50% of time spent in counseling and/or coordination of care.     Umberto Farley MD   Psychiatry  [facility]

## 2018-10-22 NOTE — NURSING
The patient asked if he could sit near the nurses station while waiting for the doctor to write an order for medication to assist w/ sleep. Dr. Reyes was on the unit to assess the patient & was asked if he could prescribe medication for sleep. Awaiting orders.

## 2018-10-22 NOTE — NURSING
The patient remains awake in bed.  2attempts made @ contacting Dr. Reyes @ [phone], no answer. Will continue to monitor the patient & provide a supportive environment.

## 2018-10-22 NOTE — ED NOTES
Pt care assumed at this time.   Pt resting in bed comfortably at this time eyes closed and respirations even and unlabored, bathroom needs addressed and comfort situated. PEC in place and one to one direct observation maintained with Rosmery LICONA at this time. Pt medically cleared and awaiting psychiatric placement . Will be available for pt's needs and will continue to monitor pt.

## 2018-10-22 NOTE — PROGRESS NOTES
Acute Psychiatric Unit  Psychosocial History and Assessment  Progress Note      Patient Name: Flako Pearce YOB: 1990 SW: [facility] Date: 10/22/2018    Chief Complaint: anxiety and depression    Consent:     Did the patient consent for an interview with the ? Yes    Did the patient consent for the  to contact family/friend/caregiver?   Yes  Relationship: mother and Contact: [phone]    Did the patient give consent for the  to inform family/friend/caregiver of his/her whereabouts or to discuss discharge planning? Yes    Source of Information: Face to face with patient, Telephone interview with family/friend/caregiver and Chart review    Is information obtained from interviews considered reliable?   yes    Reason for Admission:     Active Hospital Problems    Diagnosis  POA    *Bipolar 1 disorder [F31.9]  Yes    Anxiety [F41.9]  Yes    Alcohol use disorder, moderate, in controlled environment [F10.20]  Yes    Insomnia [G47.00]  Yes    HIV disease [B20]  Yes     Chronic      Resolved Hospital Problems   No resolved problems to display.       History of Present Illness - (Patient Perception):   Patient reports extreme anxiety and bouts of depression    Per H&P:Pt originally came to the ED at [facility] for help with his anxiety and panic attacks.  Pt last had a psychiatrist in [place], was on Klonopin daily and zyprexa 15 mg nightly.  Pt has been out of his benzos for months, self medicating with alcohol.  Pt states his last manic episode was three weeks ago, had decreased need for sleep, spent a lot of money on shopping.  Pt reports that he had auditory hallucinations a few months ago when he could not sleep, states the voices are his friends, does not currently endorse.  Pt denies current suicidality, states he felt that way earlier in the day due to the overwhelming anxiety.  Pt has had two previous suicide attempts, both by cutting his  "wrists. Pt currently complains of insomnia, no physical       History of Present Illness - (Perception of Others):She states that since his diagnosis of HIV in May of this year she believes that pt has "had a lot on his plate lately and he's just not handling it well."  She states that pt's brother and roommate staged an intervention in 7/2018, which prompted pt to enter into a 28 day detox at [facility] in [place].  Pt then went into a rehab program at [facility] in [place], [place] and was discharged 9/1/2018.  Per her, pt has a long history of drug abuse including benzo's ("benzo's are a no no for him"), EtOH, cocaine, marijuana, mushrooms and possibly heroin.  Pt has never denied nor confirmed heroin use, but it was present in his room when his brother and roommate staged in the intervention in July according to mother.    Present biopsychosocial functioning: Patient is coherent and speech is organized. Patient is able to perform ADLs.     Past biopsychosocial functioning: Patient was capable of holding down a job and finished a four year degree. Patient reports being able to perform IADLs.    Family and Marital/Relationship History:     Significant Other/Partner Relationships:  Single    Family Relationships: Intact    Culture and Anabaptism:     Anabaptism: Unknown    How strong of a role does Mormonism and spirituality play in patient's life? No    Rastafarian or spiritual concerns regarding treatment: not applicable     History of Abuse:   History of Abuse: Denies      Outcome: Not applicable    Psychiatric and Medical History:     History of psychiatric illness or treatment: prior inpatient treatment, prior suicide attempt(s), has participated in counseling/psychotherapy on an outpatient basis in the past and currently under psychiatric care    Medical history:   Past Medical History:   Diagnosis Date    Bipolar 1 disorder     PHU (generalized anxiety disorder)     Panic disorder        Substance Abuse " "History:     Alcohol - (Patient Perspective):   Social History     Substance and Sexual Activity   Alcohol Use Yes    Comment: occasionally        Alcohol - (Collateral Perspective): has had a problem in the past according to mother    Drugs - (Patient Perspective):   Social History     Substance and Sexual Activity   Drug Use No       Drugs - (Collateral Perspective): pt has a long history of drug abuse including benzo's ("benzo's are a no no for him"), EtOH, cocaine, marijuana, mushrooms and possibly heroin according to mother.    Additional Comments: Patient denies any hx of illicit drug use.    Education:     Currently Enrolled? No  Associate/Bachelor Degree    Special Education? No    Interested in Completing Education/GED: No    Employment and Financial:     Currently employed? Unemployed Reason for unemployment: missed first day of work even though mother claims he has been working there for a while.    Source of Income: parents, job    Able to afford basic needs (food, shelter, utilities)? Yes    Who manages finances/personal affairs? self      Service:     [place]? no    Combat Service? No     Community Resources:     Describe present use of community resources: none     Identify previously used community resources   (Include previous mental health treatment - outpatient and inpatient): none    Environmental:     Current living situation:Lives with family    Social Evaluation:     Patient Assets: General fund of knowledge, Supportive family/friends, Work skills, Communicable skills and Special hobby/interest    Patient Limitations: Patient is in denial about his drug use and is wanting to leave immediately.     High risk psychosocial issues that may impact discharge planning:   NA    Recommendations:     Anticipated discharge plan:   outpatient follow up    High risk issues requiring early treatment planning and immediate intervention: Patient is HIV positive and needs to medication that [facility] " does not have.    Community resources needed for discharge planning:  aftercare treatment sources    Anticipated social work role(s) in treatment and discharge planning:  will assist with discharge planning, therapy, support, locating resources and group therapy.

## 2018-10-22 NOTE — SUBJECTIVE & OBJECTIVE
Past Medical History:   Diagnosis Date    Bipolar 1 disorder     PHU (generalized anxiety disorder)     Panic disorder        History reviewed. No pertinent surgical history.    Review of patient's allergies indicates:  No Known Allergies    Medications:  Medications Prior to Admission   Medication Sig    darunavir-cobicistat (PREZCOBIX) 800-150 mg-mg Tab Take by mouth.    emtricitabine-tenofovir alafen (DESCOVY) 200-25 mg Tab Take by mouth.    OLANZapine (ZYPREXA) 10 MG tablet Take 10 mg by mouth every evening.     Antibiotics (From admission, onward)    None        Antifungals (From admission, onward)    None        Antivirals (From admission, onward)        Stop Route Frequency     emtricitabine      -- Oral Daily     tenofovir      -- Oral Daily     darunavir ethanolate      -- Oral With breakfast     ritonavir      -- Oral With breakfast             There is no immunization history on file for this patient.    Family History     None        Social History     Socioeconomic History    Marital status: Single     Spouse name: None    Number of children: None    Years of education: None    Highest education level: None   Social Needs    Financial resource strain: None    Food insecurity - worry: None    Food insecurity - inability: None    Transportation needs - medical: None    Transportation needs - non-medical: None   Occupational History    None   Tobacco Use    Smoking status: Current Some Day Smoker     Types: Cigarettes    Smokeless tobacco: Never Used   Substance and Sexual Activity    Alcohol use: Yes     Comment: occasionally     Drug use: No    Sexual activity: None   Other Topics Concern    None   Social History Narrative    None     Review of Systems   Constitutional: Negative for activity change, appetite change, chills and fever.   HENT: Negative for congestion, dental problem, ear pain and rhinorrhea.    Eyes: Negative for pain and discharge.   Respiratory: Negative for cough  and shortness of breath.    Cardiovascular: Negative for chest pain and leg swelling.   Gastrointestinal: Negative for abdominal distention, abdominal pain, constipation, diarrhea, nausea and vomiting.   Genitourinary: Negative for difficulty urinating and dysuria.   Musculoskeletal: Negative for arthralgias, back pain and joint swelling.   Skin: Negative for rash and wound.   Neurological: Negative for dizziness, light-headedness and headaches.   Psychiatric/Behavioral: Negative for behavioral problems and confusion.     Objective:     Vital Signs (Most Recent):  Temp: 97.9 °F (36.6 °C) (10/22/18 0750)  Pulse: 92 (10/22/18 0750)  Resp: 18 (10/22/18 0750)  BP: 127/80 (10/22/18 0750)  SpO2: 98 % (10/22/18 0111) Vital Signs (24h Range):  Temp:  [97.9 °F (36.6 °C)-98.1 °F (36.7 °C)] 97.9 °F (36.6 °C)  Pulse:  [77-92] 92  Resp:  [18] 18  SpO2:  [98 %] 98 %  BP: (120-129)/(70-80) 127/80     Weight: 84.8 kg (186 lb 15.2 oz)  Body mass index is 24.67 kg/m².    Estimated Creatinine Clearance: 125.4 mL/min (based on SCr of 1 mg/dL).    Physical Exam   Constitutional: He is oriented to person, place, and time. He appears well-developed and well-nourished. No distress.   HENT:   Head: Atraumatic.   Right Ear: External ear normal.   Left Ear: External ear normal.   Nose: Nose normal.   Mouth/Throat: Oropharynx is clear and moist.   Eyes: EOM are normal.   Neck: Normal range of motion. Neck supple.   Cardiovascular: Normal rate, regular rhythm and normal heart sounds.   No murmur heard.  Pulmonary/Chest: Effort normal and breath sounds normal. No respiratory distress. He has no wheezes.   Abdominal: Soft. Bowel sounds are normal. He exhibits no distension. There is no tenderness.   Musculoskeletal: Normal range of motion. He exhibits no edema or deformity.   Neurological: He is alert and oriented to person, place, and time. No cranial nerve deficit.   Skin: Skin is warm and dry. No rash noted. He is not diaphoretic. No erythema.    Psychiatric: He has a normal mood and affect. His behavior is normal.       Significant Labs:   C4 Count: No results for input(s): C4 in the last 48 hours.  CBC:   Recent Labs   Lab 10/21/18  1322   WBC 5.45   HGB 15.7   HCT 43.8        CMP:   Recent Labs   Lab 10/21/18  1322      K 4.0      CO2 27      BUN 17   CREATININE 1.0   CALCIUM 10.0   PROT 7.6   ALBUMIN 4.5   BILITOT 0.4   ALKPHOS 78   AST 15   ALT 22   ANIONGAP 6*   EGFRNONAA >60       Significant Imaging: I have reviewed all pertinent imaging results/findings within the past 24 hours.

## 2018-10-22 NOTE — HPI
28M PMH HIV and alcohol abuse who presented to ER w/ paranoia and worsening anxiety. He was admitted to psych for further evaluation. He endorses intermittent compliance w/ his medications, he does not currently have an HIV provider, and plans to move to [place] after leaving the hospital. He recently started drinking again.

## 2018-10-22 NOTE — NURSING
The patient continues to appear resting, no signs of restlessness noted . He is maintained on MVC. Medication for sleep was effective.

## 2018-10-22 NOTE — ASSESSMENT & PLAN NOTE
- endorses last manic episode was 3 weeks ago, currently depressed and anxious  - pt states he is med compliant on zyprexa 15 mg nightly  - pt would likely benefit from a mood stabilizer

## 2018-10-22 NOTE — ASSESSMENT & PLAN NOTE
- pt complains of persistent insomnia  - Melatonin and vistaril don't work, trazodone and seroquel make him a zombie, only lunesta and ambien work according to the pt.  - has not tried ramelteon, started ramelteon 8 mg prn nightly

## 2018-10-22 NOTE — PLAN OF CARE
"Collateral obtained by medical student Jairo Amato:    Collateral (from mother, Jenn):  She states that since his diagnosis of HIV in May of this year she believes that pt has "had a lot on his plate lately and he's just not handling it well."  She states that pt's brother and roommate staged an intervention in 7/2018, which prompted pt to enter into a 28 day detox at [facility] in [place].  Pt then went into a rehab program at [facility] in [place], [place] and was discharged 9/1/2018.  Per her, pt has a long history of drug abuse including benzo's ("benzo's are a no no for him"), EtOH, cocaine, marijuana, mushrooms and possibly heroin.  Pt has never denied nor confirmed heroin use, but it was present in his room when his brother and roommate staged in the intervention in July.  Mom believes that he has already started the job at [employer], she does not believe that it is "new."  Pt recently returned from [place] late last Wednesday evening (10/17/18) after visiting family, mom thinks that he may have gone to work Thursday ([employer] position).  She is very concerned about possible relapse and would like to know all of pt's lab results as pt is planning to move back home with his parents and she, understandably, wants honesty.  She confirms that he has a very supportive family and they're willing to help "but he has to do what he has to do, too."      "

## 2018-10-22 NOTE — PROGRESS NOTES
10/22/18 0900 10/22/18 1000 10/22/18 1100   [facility] Group Therapy   Group Name Community Reintegration Mental Awareness Education   Specific Interventions Current Events Cognitive Stimulation Training Guided Imagery/Relaxation   Participation Level Active;Appropriate --  Active   Participation Quality Cooperative Refused;Sleeping Cooperative   Insight/Motivation Good --  Good   Affect/Mood Display Appropriate --  Appropriate;Blunted   Cognition Alert --  Alert       10/22/18 1200   [facility] Therapy   Group Name Therapeutic Recreation   Specific Interventions Skilled Activity Crafts   Participation Level Minimal   Participation Quality Cooperative   Insight/Motivation Limited   Affect/Mood Display Appropriate;Blunted   Cognition Alert

## 2018-10-22 NOTE — ASSESSMENT & PLAN NOTE
- pt had ISMAEL of 133 when he presented to the ED  - no history of complicated withdrawal, VSS, pt is not tremulous, states he is not a daily drinker  - Ativan 2 mg PO prn with withdrawal parameters

## 2018-10-22 NOTE — ED NOTES
Pt resting in bed comfortably at this time eyes closed and respirations even and unlabored, bathroom needs addressed and comfort situated. PEC in place and one to one direct observation maintained with Claritza LICONA at this time. Pt medically cleared and awaiting medical clearance . Will be available for pt's needs and will continue to monitor pt.

## 2018-10-22 NOTE — PLAN OF CARE
Pt medically cleared by [facility] and has been accepted for transfer to [facility] for admission.    Umberto Farley MD  [employer]  10/21/2018  10:23 PM

## 2018-10-22 NOTE — NURSING
"The patient presented to the [facility] via [facility]. He is ambulatory w/ a steady gait. He is attired in [facility] scrubs w/ a disheveled appearance. His affect is blunted, mood is stated as "anxious." He states that he  Is here in [facility] from [place] for a month. He states that he presented to the ER for "real bad panic attack & I hadn't been able to leave my house." Onset of  panic  attack is 4-5 days. He states that he was treated for panic attacks for 2 months & was taking Zyprexa & Klonopin.  He was initially treated @ age 16 y/o for the same. He endorses medication compliancy. He denies S/HI, A/VH.  He presents on a PEC written 10/02/2018 @ 1305 @ [facility] by Dr. Arango. He presented to the [facility] w/ one belongings bag. He was checked for contraband, none found. Towards the end of the assessment the patient expressed a desire to leave & the PEC process was explained once again. The patient appears to have understood the process. A brief tour of the unit was provided.  "

## 2018-10-22 NOTE — MEDICAL/APP STUDENT
"Progress Note      Admit Date: 10/21/2018    LOS: 1    Subjective     Follow-up For:  Bipolar 1 disorder    HPI:  Pt is a 29yo M with HIV and past psychiatric diagnoses of bipolar 1, PHU, EtOH abuse, depression and two prior suicide attempts (approx 1.5 years ago).  Pt presented to ED yesterday saying that he's been having "panic attacks for 5 days" and mentioned SI.  He describes these panic attacks as lasting from 30 min-1 hr with associated SOB, paralyzing anxiety and diaphoresis.  He tried to drink EtOH to calm himself down, but states that this made his anxiety worse.  States he had not drank for 2 months before two nights ago.  Prior to 5 days ago, he usually got panic attacks about 2 times/mo since the age of 16yo.  He is unable to identify a trigger for the attacks.  He has a long history of BDZ use but has not filled his prescription in a few months; currently taking 15mg of Zyprexa.      He currently lives with his parents and states that they are very supportive.  He states that he has a new job at [employer] that he's supposed to start tomorrow and would like to be released today.  Denies SI, HI, depression or manic symptoms; states that his anxiety has been worse since his admission.      Collateral (from mother, Jenn):  She states that since his diagnosis of HIV in May of this year she believes that pt has "had a lot on his plate lately and he's just not handling it well."  She states that pt's brother and roommate staged an intervention in 7/2018, which prompted pt to enter into a 28 day detox at [facility] in [place].  Pt then went into a rehab program at [facility] in [place], [place] and was discharged 9/1/2018.  Per her, pt has a long history of drug abuse including benzo's ("benzo's are a no no for him"), EtOH, cocaine, marijuana, mushrooms and possibly heroin.  Pt has never denied nor confirmed heroin use, but it was present in his room when his brother and roommate staged in the intervention in " "July.  Mom believes that he has already started the job at [employer], she does not believe that it is "new."  Pt recently returned from [place] late last Wednesday evening (10/17/18) after visiting family, mom thinks that he may have gone to work Thursday ([employer] position).  She is very concerned about possible relapse and would like to know all of pt's lab results as pt is planning to move back home with his parents and she, understandably, wants honesty.  She confirms that he has a very supportive family and they're willing to help "but he has to do what he has to do, too."    Objective     Vitals  Temp:  [97.9 °F (36.6 °C)-98.1 °F (36.7 °C)]   Pulse:  []   Resp:  [15-18]   BP: (120-136)/(70-80)   SpO2:  [96 %-98 %]        MSE:   Appearance: appear to be stated age, red eyes, tall, thin male, well kempt  Behavior: cooperative, appropriate, good eye contact   Speech: spontaneous,  Normal tone, volume, pitch   Mood: anxious  Affect: reactive  Thought process: linear, focused on leaving to get to new job tomorrow morning  Thought content: denies SI, HI  Cognition: Attentive, oriented, good memory  Insight: poor, would like to be discharged, unwilling to stay for further treatment   Judgment: good today      Diagnostic Results:  CBC  Lab Results   Component Value Date    WBC 5.45 10/21/2018    HGB 15.7 10/21/2018    HCT 43.8 10/21/2018    MCV 90 10/21/2018     10/21/2018       BMP  Lab Results   Component Value Date     10/21/2018    K 4.0 10/21/2018     10/21/2018    CO2 27 10/21/2018    BUN 17 10/21/2018    CREATININE 1.0 10/21/2018    CALCIUM 10.0 10/21/2018    ANIONGAP 6 (L) 10/21/2018    ESTGFRAFRICA >60 10/21/2018    EGFRNONAA >60 10/21/2018         Assessment     Flako is a 29yo M with HIV and past psychiatric diagnoses of bipolar 1, PHU, EtOH abuse, depression who presents with anxiety.        Plan     Anxiety  -Mirtazipine 15mg qhs  -Zyprexa 15mg qhs, 10mg q6h prn "     HIV  -Consulted ID for assistance in med management  -Was on prezcobix (darunavir 800/cobicistat 150) and descovy (emtricitabine 200/tenofovir alafenamide 25) as outpt    RLE pain  -Lyrica 150mg bid

## 2018-10-22 NOTE — ASSESSMENT & PLAN NOTE
- pt had ISMAEL of 133 when he presented to the ED, pt self medicates anxiety  - no history of complicated withdrawal, VSS, pt is not tremulous, states he is not a daily drinker  - started Ativan 2 mg PO prn with withdrawal parameters

## 2018-10-23 PROBLEM — F19.959 SUBSTANCE-INDUCED PSYCHOTIC DISORDER: Status: ACTIVE | Noted: 2018-10-22

## 2018-10-23 PROBLEM — F19.94 SUBSTANCE INDUCED MOOD DISORDER: Status: ACTIVE | Noted: 2018-10-22

## 2018-10-23 LAB
CD3+CD4+ CELLS # BLD: 605 CELLS/UL (ref 300–1400)
CD3+CD4+ CELLS NFR BLD: 25 % (ref 28–57)
RPR SER QL: NORMAL

## 2018-10-23 PROCEDURE — 99232 SBSQ HOSP IP/OBS MODERATE 35: CPT | Mod: ,,, | Performed by: PSYCHIATRY & NEUROLOGY

## 2018-10-23 PROCEDURE — 25000003 PHARM REV CODE 250: Performed by: INTERNAL MEDICINE

## 2018-10-23 PROCEDURE — S4991 NICOTINE PATCH NONLEGEND: HCPCS | Performed by: STUDENT IN AN ORGANIZED HEALTH CARE EDUCATION/TRAINING PROGRAM

## 2018-10-23 PROCEDURE — 25000003 PHARM REV CODE 250: Performed by: PSYCHIATRY & NEUROLOGY

## 2018-10-23 PROCEDURE — 25000003 PHARM REV CODE 250: Performed by: STUDENT IN AN ORGANIZED HEALTH CARE EDUCATION/TRAINING PROGRAM

## 2018-10-23 PROCEDURE — 90833 PSYTX W PT W E/M 30 MIN: CPT | Mod: ,,, | Performed by: PSYCHIATRY & NEUROLOGY

## 2018-10-23 PROCEDURE — 90853 GROUP PSYCHOTHERAPY: CPT | Mod: ,,, | Performed by: PSYCHOLOGIST

## 2018-10-23 PROCEDURE — 12400001 HC PSYCH SEMI-PRIVATE ROOM

## 2018-10-23 RX ADMIN — PREGABALIN 150 MG: 100 CAPSULE ORAL at 08:10

## 2018-10-23 RX ADMIN — THERA TABS 1 TABLET: TAB at 08:10

## 2018-10-23 RX ADMIN — MIRTAZAPINE 15 MG: 15 TABLET, ORALLY DISINTEGRATING ORAL at 08:10

## 2018-10-23 RX ADMIN — FOLIC ACID 1 MG: 1 TABLET ORAL at 08:10

## 2018-10-23 RX ADMIN — EMTRICITABINE 200 MG: 200 CAPSULE ORAL at 08:10

## 2018-10-23 RX ADMIN — TENOFOVIR DISOPROXIL FUMARATE 300 MG: 300 TABLET, FILM COATED ORAL at 09:10

## 2018-10-23 RX ADMIN — DARUNAVIR 800 MG: 800 TABLET, FILM COATED ORAL at 08:10

## 2018-10-23 RX ADMIN — OLANZAPINE 15 MG: 5 TABLET, FILM COATED ORAL at 08:10

## 2018-10-23 RX ADMIN — NICOTINE 1 PATCH: 7 PATCH, EXTENDED RELEASE TRANSDERMAL at 08:10

## 2018-10-23 RX ADMIN — RITONAVIR 100 MG: 100 TABLET, FILM COATED ORAL at 08:10

## 2018-10-23 NOTE — PLAN OF CARE
Pt visible and actively engaged in milieu. Denies SI/HI/AVH, denies depressed mood or thoughts of self harm. He is attending groups and structured activities with appropriate interactions. Plan of care and legal status reviewed with pt. He was evaluated by [facility] and CEC executed.

## 2018-10-23 NOTE — PROGRESS NOTES
[facility]  Psychiatry  Progress Note    Patient Name: Flako Pearce  MRN: [id number]   Code Status: Full Code  Admission Date: 10/21/2018  Hospital Length of Stay: 2 days  Expected Discharge Date:   Attending Physician: Andrew Anderson MD  Primary Care Provider: Primary Doctor No    [place] [facility] [place] information was obtained from patient and ER records.     Subjective:     Principal Problem:Substance-induced psychotic disorder    Chief Complaint: Substance induced mood disorder    HPI:   Inpatient Psychiatry History & Physical      Chief Complaint / Reason for Consult:     suicidal ideation and anxiety     Subjective:     History of Present Illness:   Flako Pearce is a 28 y.o. male with a history of panic attacks, bipolar I disorder, and PHU who presented to [facility] due to panic attacks, anxiety, SI, depression.     Per ED MD:  This is a 28 y.o. male, with history of HIV, who presents with complaint of panic attacks starting five days ago. He reports that he has not left his home in five days because of fear of what will happen if he does leave. He also experiences palpitations and auditory hallucinations telling him not to leave his home. He reports that while at home, he tries his best to sleep. He has passive suicidal ideations, with no plan in place. He denies thought of overdose. He denies fever, chills, rhinorrhea, SOB, CP, abdominal pain, N/V/D, any urinary symptoms, back pain, headache, or weakness. He attempted suicide approximately 1.5 years ago, where he cut his wrists. He reports that he was hospitalized on 8-5-18 for similar symptoms, where he experienced uncontrollable thoughts and panic attacks. He denies any recent illness. He believes that his olanzapine has stopped working. He also takes Zyprexa. He has not taken any Xanax recently. He also takes medicine for HIV.      He states that he is scared to talk to anyone about his anxiety. He has a roommate  who is never home. His parents live in [place], but they are currently out of town in [place]. He also has a friend in [place]. He last saw his psychiatrist, Dr. Rowley, in [place] a few weeks ago. He has not sought out resources in [place]. 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 He reports ETOH use last night, but no illicit drug use.    Inpatient interview:  Pt originally came to the ED at [facility] for help with his anxiety and panic attacks.  Pt last had a psychiatrist in [place], was on Klonopin daily and zyprexa 15 mg nightly.  Pt has been out of his benzos for months, self medicating with alcohol.  Pt states his last manic episode was three weeks ago, had decreased need for sleep, spent a lot of money on shopping.  Pt reports that he had auditory hallucinations a few months ago when he could not sleep, states the voices are his friends, does not currently endorse.  Pt denies current suicidality, states he felt that way earlier in the day due to the overwhelming anxiety.  Pt has had two previous suicide attempts, both by cutting his wrists. Pt currently complains of insomnia, no physical complaints.      Collateral:   none    Medical Review Of Systems:  Pertinent items are noted in HPI.    Psychiatric Review Of Systems - Is patient experiencing or having changes in:  sleep: yes  appetite: no  weight: no  energy/anergy: yes  interest/pleasure/anhedonia: yes  somatic symptoms: yes  libido: no  anxiety/panic: yes  guilty/hopelessness: yes  concentration: no  S.I.B.s/risky behavior: no  any drugs: no  alcohol: yes     Allergies:  Patient has no known allergies.    Past Medical/Surgical History  Past Medical History:   Diagnosis Date    Bipolar 1 disorder     PHU (generalized anxiety disorder)     Panic disorder      History reviewed. No pertinent surgical history.    Past Psychiatric History:  Previous Medication Trials: yes    Previous Psychiatric Hospitalizations: yes   Previous Suicide Attempts: yes x 2, most recent 1.5 years ago  History of Violence: no  Outpatient Psychiatrist: no, not currently in [place]    Social History:  Marital Status: not   Children: 0   Employment Status/Info: recently quit his job, plans to move back in with his parents  Education: college graduate  Special Ed: no  Housing Status: in apartment with friend  History of phys/sexual abuse: no  Access to gun: unknown    Substance Abuse History:  Recreational Drugs: denies  Use of Alcohol: moderate  Rehab History: no   Tobacco Use: no  Use of Caffeine: denies use  Use of OTC: denies  Legal consequences of chemical use: no    Legal History:  Past Charges/Incarcerations: no,     Pending charges: no     Family Psychiatric History:   denies    Objective:     Current Medications:  Infusions:    Scheduled:   folic acid  1 mg Oral Daily    multivitamin  1 tablet Oral Daily    nicotine  1 patch Transdermal Daily     PRN:  OLANZapine **AND** OLANZapine, ramelteon    Home Medications:  Prior to Admission medications    Medication Sig Start Date End Date Taking? Authorizing Provider   darunavir-cobicistat (PREZCOBIX) 800-150 mg-mg Tab Take by mouth.    Historical Provider, MD   emtricitabine-tenofovir alafen (DESCOVY) 200-25 mg Tab Take by mouth.    Historical Provider, MD   OLANZapine (ZYPREXA) 10 MG tablet Take 10 mg by mouth every evening.    Historical Provider, MD     Vital Signs:  Temp:  [97.9 °F (36.6 °C)-98.1 °F (36.7 °C)]   Pulse:  []   Resp:  [15-18]   BP: (120-136)/(70-75)   SpO2:  [96 %-98 %]     Physical Exam:  Gen: Alert, calm, cooperative, NAD   Head: NCAT, PERRL, EOMI, MMM   Lungs: CTAB, respirations unlabored   Chest wall: No tenderness or deformity   Heart: RRR, S1/S2 normal, no M/R/G   Abdomen: S/NT/ND, +BS, no HSM, no masses   Extremities: Extremities normal, atraumatic, no cyanosis or edema   Pulses: 2+ and symmetric all extremities   Skin:  "Skin color, texture, turgor normal; no rashes or lesions   Neurologic: CN II-XII grossly intact, normal strength, sensations and reflexes throughout     Mental Status Exam:  Appearance: unremarkable, age appropriate   Behavior: normal, cooperative, restless and fidgety    Speech/Language: normal tone, normal rate, normal pitch, normal volume   Mood: euthymic   Affect: mood congruent   Thought Process:  normal and logical   Thought Content: normal, no suicidality, no homicidality, delusions, or paranoia   Orientation: grossly intact   Cognition: grossly intact   Insight: fair   Judgment: fair       Hospital Course: 10/23/2018   Patient calm and cooperative with interview. Dressed in hospital scrub top and jeans. Mood is "ok, I am a little depressed because I am here." Patient provides superficial responses initially. Reports previous paranoid thoughts of neighbors "talking about me." Reports anxiety is manageable today, but no longer experiencing panic. Denies SI, HI, AVH. Discussed patient's previous substance use. Patient was started on Zyprexa 15 mg at alcohol detoxification center. Patient reports this helps with AHs and paranoia. Per mother, patient has no history of manic epidoses. Patient was started a substance rehabilitation IOP in [place] this September. Patient recently got an apartment in [place] (10/01/18) and lives with a roommate. He commutes to [place] for IOP. Patient has been medication compliant, no medication side-effects reported. Received ramelteon 8 mg po PRN for insomnia in the past 24 hours. Reports sleeping great and eating well. Denies palpitations, SOB or n/v. Endorses history of right LE sciatica for which Lyrica is efficacious in treating pain.        Interval History: See hospital course    Family History     None        Tobacco Use    Smoking status: Current Some Day Smoker     Types: Cigarettes    Smokeless tobacco: Never Used   Substance and Sexual Activity    Alcohol " "use: Yes     Comment: occasionally     Drug use: No    Sexual activity: Not on file     Psychotherapeutics (From admission, onward)    Start     Stop Route Frequency Ordered    10/22/18 2100  OLANZapine tablet 15 mg      -- Oral Nightly 10/22/18 1058    10/22/18 2100  mirtazapine disintegrating tablet 15 mg      -- Oral Nightly 10/22/18 1155    10/22/18 0246  LORazepam tablet 2 mg      -- Oral Every 4 hours PRN 10/22/18 0146    10/22/18 0209  ramelteon tablet 8 mg      -- Oral Nightly PRN 10/22/18 0109    10/21/18 2333  OLANZapine tablet 10 mg  (Olanzapine)      -- Oral Every 6 hours PRN 10/21/18 2333    10/21/18 2333  OLANZapine injection 10 mg  (Olanzapine)      -- IM Every 6 hours PRN 10/21/18 2333           Review of Systems   Cardiovascular: Negative for palpitations.   Gastrointestinal: Negative for nausea and vomiting.     Objective:     Vital Signs (Most Recent):  Temp: 98.9 °F (37.2 °C) (10/23/18 0758)  Pulse: 75 (10/23/18 0758)  Resp: 17 (10/23/18 0758)  BP: 129/76 (10/23/18 0758)  SpO2: 98 % (10/22/18 0111) Vital Signs (24h Range):  Temp:  [98.6 °F (37 °C)-98.9 °F (37.2 °C)] 98.9 °F (37.2 °C)  Pulse:  [65-75] 75  Resp:  [17] 17  BP: (123-129)/(76-79) 129/76     Height: 6' 1" (185.4 cm)  Weight: 84.8 kg (186 lb 15.2 oz)  Body mass index is 24.67 kg/m².    No intake or output data in the 24 hours ending 10/23/18 0835  Mental Status Exam:  Appearance: normal weight, fair grooming, slightly disheveled  Behavior/Cooperation: guarded at times, reluctant to provide information regarding substance use  Speech: normal tone, normal rate, normal pitch, normal volume  Orientation: grossly intact  Attention Span/Concentration: intact  Memory: Grossly intact  Mood: "OK, slightly depressed because I am here."  Affect: constricted  Thought Process: logical, perseverative on discharge  Thought Content: normal, no suicidality, no homicidality, delusions, or paranoia  Fund of Knowledge: Aware of current events and " Vocabulary appropriate   Abstraction: able to abstract  Insight: fair, minimizes substance use  Judgment: appropriate for setting, fair regarding plan to remain sober       Significant Labs:   Last 72 Hours:   Recent Lab Results       10/22/18  0542   10/21/18  1649   10/21/18  1322        Benzodiazepines     Negative     Methadone metabolites     Negative     Phencyclidine     Negative     Acetaminophen (Tylenol), Serum     <3.0  Comment:  Toxic Levels:  Adults (4 hr post-ingestion).........>150 ug/mL  Adults (12 hr post-ingestion)........>40 ug/mL  Peds (2 hr post-ingestion, liquid)...>225 ug/mL       Albumin     4.5     Alcohol, Medical, Serum   48 133     Alkaline Phosphatase     78     ALT     22     Amphetamine Screen, Ur     Negative     Anion Gap     6     Appearance, UA     Clear     AST     15     Barbiturate Screen, Ur     Negative     Baso #     0.02     Basophil%     0.4     Bilirubin (UA)     Negative     Total Bilirubin     0.4  Comment:  For infants and newborns, interpretation of results should be based  on gestational age, weight and in agreement with clinical  observations.  Premature Infant recommended reference ranges:  Up to 24 hours.............<8.0 mg/dL  Up to 48 hours............<12.0 mg/dL  3-5 days..................<15.0 mg/dL  6-29 days.................<15.0 mg/dL       BUN, Bld     17     Calcium     10.0     Chloride     107     Cholesterol 227  Comment:  The National Cholesterol Education Program (NCEP) has set the  following guidelines (reference ranges) for Cholesterol:  Optimal.....................<200 mg/dL  Borderline High.............200-239 mg/dL  High........................> or = 240 mg/dL           CO2     27     Cocaine (Metab.)     Negative     Color, UA     Yellow     Creatinine     1.0     Creatinine, Random Ur     14.1  Comment:  The random urine reference ranges provided were established   for 24 hour urine collections.  No reference ranges exist for  random urine  specimens.  Correlate clinically.       Differential Method     Automated     eGFR if      >60     eGFR if non      >60  Comment:  Calculation used to obtain the estimated glomerular filtration  rate (eGFR) is the CKD-EPI equation.        Eos #     0.1     Eosinophil%     1.7     Estimated Avg Glucose 91         Glucose     102     Glucose, UA     Negative     Gran # (ANC)     2.8     Gran%     51.8     HDL 40  Comment:  The National Cholesterol Education Program (NCEP) has set the  following guidelines (reference values) for HDL Cholesterol:  Low...............<40 mg/dL  Optimal...........>60 mg/dL           HDL/Chol Ratio 17.6         Hematocrit     43.8     Hemoglobin     15.7     Hemoglobin A1C 4.8  Comment:  ADA Screening Guidelines:  5.7-6.4%  Consistent with prediabetes  >or=6.5%  Consistent with diabetes  High levels of fetal hemoglobin interfere with the HbA1C  assay. Heterozygous hemoglobin variants (HbS, HgC, etc)do  not significantly interfere with this assay.   However, presence of multiple variants may affect accuracy.           Ketones, UA     Negative     LDL Cholesterol 141.4  Comment:  The National Cholesterol Education Program (NCEP) has set the  following guidelines (reference values) for LDL Cholesterol:  Optimal.......................<130 mg/dL  Borderline High...............130-159 mg/dL  High..........................160-189 mg/dL  Very High.....................>190 mg/dL           Leukocytes, UA     Negative     Lymph #     1.8     Lymph%     33.6     MCH     32.2     MCHC     35.8     MCV     90     Mono #     0.7     Mono%     12.1     MPV     9.4     Nitrite, UA     Negative     Non-HDL Cholesterol 187  Comment:  Risk category and Non-HDL cholesterol goals:  Coronary heart disease (CHD)or equivalent (10-year risk of CHD >20%):  Non-HDL cholesterol goal     <130 mg/dL  Two or more CHD risk factors and 10-year risk of CHD <= 20%:  Non-HDL cholesterol goal      <160 mg/dL  0 to 1 CHD risk factor:  Non-HDL cholesterol goal     <190 mg/dL           Occult Blood UA     Negative     Opiate Scrn, Ur     Negative     pH, UA     6.0     Platelets     187     Potassium     4.0     Total Protein     7.6     Protein, UA     Negative  Comment:  Recommend a 24 hour urine protein or a urine   protein/creatinine ratio if globulin induced proteinuria is  clinically suspected.       RBC     4.87     RDW     12.4     Salicylate Lvl     <5.0  Comment:  Toxic:  30.0 - 70.0 mg/dl  Lethal: >70.0 mg/dl       Sodium     140     Specific Gravity, UA     <=1.005     Specimen UA     Urine, Clean Catch     Marijuana (THC) Metabolite     Negative     Total Cholesterol/HDL Ratio 5.7         Toxicology Information     SEE COMMENT  Comment:  This screen includes the following classes of drugs at the   listed cut-off:  Benzodiazepines                  200 ng/ml  Methadone                        300 ng/ml  Cocaine metabolite               300 ng/ml  Opiates                          300 ng/ml  Barbiturates                     200 ng/ml  Amphetamines                    1000 ng/ml  Marijuana metabs (THC)            50 ng/ml  Phencyclidine (PCP)               25 ng/ml  High concentrations of Diphenhydramine may cross-react with  Phencyclidine PCP screening immunoassay giving a false   positive result.  High concentrations of Methylenedioxymethamphetamine (MDMA aka  Ectasy) and other structurally similar compounds may cross-   react with the Amphetamine/Methamphetamine screening   immunoassay giving a false positive result.  A metabolite of the anti-HIV drug Sustiva () may cause  false positive results in the Marijuana metabolite (THC)   screening assay.  Note: This exception list includes only more common   interferants in toxicology screen testing.  Because of many   cross-reactantspositive results on toxicology drug screens   should be confirmed whenever results do not correlate with   clinical  presentation.  This report is intended for use in clinical monitoring and  management of patients. It is not intended for use in   employment related drug testing.  Because of any cross-reactants, positive results on toxicology  drug screens should be confirmed whenever results do not  correlate with clinical presentation.  Presumptive positive results are unconfirmed and may be used   only for medical purposes.       Triglycerides 228  Comment:  The National Cholesterol Education Program (NCEP) has set the  following guidelines (reference values) for triglycerides:  Normal......................<150 mg/dL  Borderline High.............150-199 mg/dL  High........................200-499 mg/dL           TSH     0.891     Urobilinogen, UA     Negative     WBC     5.45           Significant Imaging: None    Assessment/Plan:     * Substance-induced psychotic disorder    - Endorses last manic episode was 3 weeks ago, currently depressed and anxious  - Endorses paranoid thoughts of neighbors talking negatively about him  - Zyprexa 15 mg nightly       HIV (human immunodeficiency virus infection)    - Unable to continue home medications as they are non-forumulary  Per Infectious Disease:  - Darunavir ethanolate 800 mg po q with breakfast  - Ritonavir 100 mg po q with breakfast  - Emtricitabine  200 mg po q daily  - on discharge, patient should resume his home descovy and prezcobix   - If he plans to move to [place], recommend arranging follow up w/ Dr. Mercado [address], [place] [address] - [phone]  - If patient stays in [place], he is welcome to follow up in our clinic for further HIV care  - will check CD4, VL and RPR + FTA while inpatient     Insomnia    - pt complains of persistent insomnia  - Melatonin and vistaril don't work, trazodone and seroquel make him a zombie, only lunesta and ambien work according to the pt.  - Ramelteon 8 mg prn nightly     Alcohol use disorder, moderate,  in controlled environment    - pt had ISMAEL of 133 when he presented to the ED  - no history of complicated withdrawal, VSS, pt is not tremulous, states he is not a daily drinker  - Ativan 2 mg PO prn with withdrawal parameters         Substance induced mood disorder    - pt endorses panic attacks and PHU  Likely, 2/2 substance use  - states only things that work are benzos, was previously on ativan and klonopin, has been out of klonopin for 3 months  - pt has been self medicating with etoh  - Continue mirtazapine 15 mg po qhs          Need for Continued Hospitalization:   Protective inpatient psychiatric hospitalization required while a safe disposition plan is enacted. and Requires ongoing hospitalization for stabilization of medications.    Anticipated Disposition: Intensive Outpatient      Total time:  25 with greater than 50% of this time spent in counseling and/or coordination of care.       Sameer Benitez, DO   Psychiatry  [facility]

## 2018-10-23 NOTE — ASSESSMENT & PLAN NOTE
- Unable to continue home medications as they are non-forumulary  Per Infectious Disease:  - Darunavir ethanolate 800 mg po q with breakfast  - Ritonavir 100 mg po q with breakfast  - Emtricitabine  200 mg po q daily  - on discharge, patient should resume his home descovy and prezcobix   - If he plans to move to [place], recommend arranging follow up w/ Dr. Rogelio Jung [address] [place] [address] [address], [place] [address] - [phone]  - If patient stays in [place], he is welcome to follow up in our clinic for further HIV care  - will check CD4, VL and RPR + FTA while inpatient

## 2018-10-23 NOTE — MEDICAL/APP STUDENT
"Progress Note      Admit Date: 10/21/2018    LOS: 2    Subjective     Follow-up For:  Substance-induced psychotic disorder    HPI:  Pt is a 27yo M with HIV and past psychiatric diagnoses of bipolar 1, PHU, EtOH abuse, depression and two prior suicide attempts (approx 1.5 years ago).  Pt presented to ED yesterday saying that he's been having "panic attacks for 5 days" and mentioned SI.  He describes these panic attacks as lasting from 30 min-1 hr with associated SOB, paralyzing anxiety and diaphoresis.  He tried to drink EtOH to calm himself down, but states that this made his anxiety worse.  States he had not drank for 2 months before two nights ago.  Prior to 5 days ago, he usually got panic attacks about 2 times/mo since the age of 16yo.  He is unable to identify a trigger for the attacks.  He has a long history of BDZ use but has not filled his prescription in a few months; currently taking 15mg of Zyprexa.       He currently lives with his parents and states that they are very supportive.  He states that he has a new job at [employer] that he's supposed to start tomorrow and would like to be released today.  Denies SI, HI, depression or manic symptoms; states that his anxiety has been worse since his admission.       Collateral (from mother, Jenn):  She states that since his diagnosis of HIV in May of this year she believes that pt has "had a lot on his plate lately and he's just not handling it well."  She states that pt's brother and roommate staged an intervention in 7/2018, which prompted pt to enter into a 28 day detox at [facility] in [place].  Pt then went into a rehab program at [facility] in [place], [place] and was discharged 9/1/2018.  Per her, pt has a long history of drug abuse including benzo's ("benzo's are a no no for him"), EtOH, cocaine, marijuana, mushrooms and possibly heroin.  Pt has never denied nor confirmed heroin use, but it was present in his room when his brother and roommate staged in " "the intervention in July.  Mom believes that he has already started the job at [employer], she does not believe that it is "new."  Pt recently returned from [place] late last Wednesday evening (10/17/18) after visiting family, mom thinks that he may have gone to work Thursday ([employer] position).  She is very concerned about possible relapse and would like to know all of pt's lab results as pt is planning to move back home with his parents and she, understandably, wants honesty.  She confirms that he has a very supportive family and they're willing to help "but he has to do what he has to do, too."    10/23:  Pt is concerned about when he will be discharged as he's anxious to get back to the [facility] (int[facility] outpt program) in [place] that he has been attending 3x/week.  He states that he feels safe and is denying SI now and that he disclosed his EtOH relapse to his family.  Upon discharge, his plan is to finish moving all of his belongings to his parents house in [place]; parents are still in [place] for family health issues and will be for another couple of weeks.  Pt states that his brother is supportive and lives in [place], so if pt needs help moving then his brother could help him out.      Objective     Vitals  Temp:  [98.6 °F (37 °C)-98.9 °F (37.2 °C)]   Pulse:  [65-75]   Resp:  [17]   BP: (123-129)/(76-79)      MSE:  Appearance: appear to be stated age, tall, thin male, well kempt  Behavior: cooperative, appropriate, good eye contact   Speech: spontaneous,  Normal tone, volume, pitch   Mood: less anxious than yesterday   Affect: reactive  Thought process: linear, focused on discharge and return to therapy  Thought content: denies SI, HI, delusions, hallucinations  Cognition: Attentive, oriented, good memory  Insight: improving, eager to get back to his previous treatment plan  Judgment: good       Diagnostic Results:  CBC  Lab Results   Component Value Date    WBC 5.45 10/21/2018 "    HGB 15.7 10/21/2018    HCT 43.8 10/21/2018    MCV 90 10/21/2018     10/21/2018       BMP  Lab Results   Component Value Date     10/21/2018    K 4.0 10/21/2018     10/21/2018    CO2 27 10/21/2018    BUN 17 10/21/2018    CREATININE 1.0 10/21/2018    CALCIUM 10.0 10/21/2018    ANIONGAP 6 (L) 10/21/2018    ESTGFRAFRICA >60 10/21/2018    EGFRNONAA >60 10/21/2018         Assessment     Flako is a 27yo M with HIV and past psychiatric diagnoses of bipolar 1, PHU, EtOH abuse, depression who presents with anxiety.    Plan     Anxiety  -Mirtazipine 15mg qhs  -Zyprexa 15mg qhs, 10mg q6h prn      HIV  -Consulted ID for assistance in med management  -Was on prezcobix (darunavir 800/cobicistat 150) and descovy (emtricitabine 200/tenofovir alafenamide 25) as outpt     RLE pain  -Lyrica 150mg bid    -Contact mother again to discuss plan; possibly brother (lives in [place]) to  pt when discharged.

## 2018-10-23 NOTE — PLAN OF CARE
10/22/18 2000   [facility] Group Therapy   Group Name Community Reintegration   Specific Interventions Resocialization   Participation Level Supportive;Active;Appropriate   Participation Quality Cooperative;Social   Insight/Motivation Good   Affect/Mood Display Appropriate   Cognition Alert;Oriented
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
## 2018-10-23 NOTE — ASSESSMENT & PLAN NOTE
- Endorses last manic episode was 3 weeks ago, currently depressed and anxious  - Endorses paranoid thoughts of neighbors talking negatively about him  - Zyprexa 15 mg nightly

## 2018-10-23 NOTE — PROGRESS NOTES
10/23/18 0900 10/23/18 1000 10/23/18 1100   [facility] Group Therapy   Group Name Community Reintegration Education Education   Specific Interventions Current Events Relapse Prevention Guided Imagery/Relaxation   Participation Level Active;Appropriate Active;Appropriate;Attentive Active;Appropriate;Attentive   Participation Quality Cooperative;Social Cooperative;Social Cooperative;Social   Insight/Motivation Good Good Good   Affect/Mood Display Appropriate Appropriate Appropriate   Cognition Alert Alert Alert       10/23/18 1300   [facility] Group Therapy   Group Name Therapeutic Recreation   Specific Interventions Skilled Activity Crafts   Participation Level --    Participation Quality Refused;Lack of Interest;Drowsy   Insight/Motivation --    Affect/Mood Display --    Cognition --
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
## 2018-10-23 NOTE — SUBJECTIVE & OBJECTIVE
"Interval History: See hospital course    Family History     None        Tobacco Use    Smoking status: Current Some Day Smoker     Types: Cigarettes    Smokeless tobacco: Never Used   Substance and Sexual Activity    Alcohol use: Yes     Comment: occasionally     Drug use: No    Sexual activity: Not on file     Psychotherapeutics (From admission, onward)    Start     Stop Route Frequency Ordered    10/22/18 2100  OLANZapine tablet 15 mg      -- Oral Nightly 10/22/18 1058    10/22/18 2100  mirtazapine disintegrating tablet 15 mg      -- Oral Nightly 10/22/18 1155    10/22/18 0246  LORazepam tablet 2 mg      -- Oral Every 4 hours PRN 10/22/18 0146    10/22/18 0209  ramelteon tablet 8 mg      -- Oral Nightly PRN 10/22/18 0109    10/21/18 2333  OLANZapine tablet 10 mg  (Olanzapine)      -- Oral Every 6 hours PRN 10/21/18 2333    10/21/18 2333  OLANZapine injection 10 mg  (Olanzapine)      -- IM Every 6 hours PRN 10/21/18 2333           Review of Systems   Cardiovascular: Negative for palpitations.   Gastrointestinal: Negative for nausea and vomiting.     Objective:     Vital Signs (Most Recent):  Temp: 98.9 °F (37.2 °C) (10/23/18 0758)  Pulse: 75 (10/23/18 0758)  Resp: 17 (10/23/18 0758)  BP: 129/76 (10/23/18 0758)  SpO2: 98 % (10/22/18 0111) Vital Signs (24h Range):  Temp:  [98.6 °F (37 °C)-98.9 °F (37.2 °C)] 98.9 °F (37.2 °C)  Pulse:  [65-75] 75  Resp:  [17] 17  BP: (123-129)/(76-79) 129/76     Height: 6' 1" (185.4 cm)  Weight: 84.8 kg (186 lb 15.2 oz)  Body mass index is 24.67 kg/m².    No intake or output data in the 24 hours ending 10/23/18 0835  Mental Status Exam:  Appearance: normal weight, fair grooming, slightly disheveled  Behavior/Cooperation: guarded at times, reluctant to provide information regarding substance use  Speech: normal tone, normal rate, normal pitch, normal volume  Orientation: grossly intact  Attention Span/Concentration: intact  Memory: Grossly intact  Mood: "OK, slightly depressed " "because I am here."  Affect: constricted  Thought Process: logical, perseverative on discharge  Thought Content: normal, no suicidality, no homicidality, delusions, or paranoia  Fund of Knowledge: Aware of current events and Vocabulary appropriate   Abstraction: able to abstract  Insight: fair, minimizes substance use  Judgment: appropriate for setting, fair regarding plan to remain sober       Significant Labs:   Last 72 Hours:   Recent Lab Results       10/22/18  0542   10/21/18  1649   10/21/18  1322        Benzodiazepines     Negative     Methadone metabolites     Negative     Phencyclidine     Negative     Acetaminophen (Tylenol), Serum     <3.0  Comment:  Toxic Levels:  Adults (4 hr post-ingestion).........>150 ug/mL  Adults (12 hr post-ingestion)........>40 ug/mL  Peds (2 hr post-ingestion, liquid)...>225 ug/mL       Albumin     4.5     Alcohol, Medical, Serum   48 133     Alkaline Phosphatase     78     ALT     22     Amphetamine Screen, Ur     Negative     Anion Gap     6     Appearance, UA     Clear     AST     15     Barbiturate Screen, Ur     Negative     Baso #     0.02     Basophil%     0.4     Bilirubin (UA)     Negative     Total Bilirubin     0.4  Comment:  For infants and newborns, interpretation of results should be based  on gestational age, weight and in agreement with clinical  observations.  Premature Infant recommended reference ranges:  Up to 24 hours.............<8.0 mg/dL  Up to 48 hours............<12.0 mg/dL  3-5 days..................<15.0 mg/dL  6-29 days.................<15.0 mg/dL       BUN, Bld     17     Calcium     10.0     Chloride     107     Cholesterol 227  Comment:  The National Cholesterol Education Program (NCEP) has set the  following guidelines (reference ranges) for Cholesterol:  Optimal.....................<200 mg/dL  Borderline High.............200-239 mg/dL  High........................> or = 240 mg/dL           CO2     27     Cocaine (Metab.)     Negative     Color, " UA     Yellow     Creatinine     1.0     Creatinine, Random Ur     14.1  Comment:  The random urine reference ranges provided were established   for 24 hour urine collections.  No reference ranges exist for  random urine specimens.  Correlate clinically.       Differential Method     Automated     eGFR if      >60     eGFR if non      >60  Comment:  Calculation used to obtain the estimated glomerular filtration  rate (eGFR) is the CKD-EPI equation.        Eos #     0.1     Eosinophil%     1.7     Estimated Avg Glucose 91         Glucose     102     Glucose, UA     Negative     Gran # (ANC)     2.8     Gran%     51.8     HDL 40  Comment:  The National Cholesterol Education Program (NCEP) has set the  following guidelines (reference values) for HDL Cholesterol:  Low...............<40 mg/dL  Optimal...........>60 mg/dL           HDL/Chol Ratio 17.6         Hematocrit     43.8     Hemoglobin     15.7     Hemoglobin A1C 4.8  Comment:  ADA Screening Guidelines:  5.7-6.4%  Consistent with prediabetes  >or=6.5%  Consistent with diabetes  High levels of fetal hemoglobin interfere with the HbA1C  assay. Heterozygous hemoglobin variants (HbS, HgC, etc)do  not significantly interfere with this assay.   However, presence of multiple variants may affect accuracy.           Ketones, UA     Negative     LDL Cholesterol 141.4  Comment:  The National Cholesterol Education Program (NCEP) has set the  following guidelines (reference values) for LDL Cholesterol:  Optimal.......................<130 mg/dL  Borderline High...............130-159 mg/dL  High..........................160-189 mg/dL  Very High.....................>190 mg/dL           Leukocytes, UA     Negative     Lymph #     1.8     Lymph%     33.6     MCH     32.2     MCHC     35.8     MCV     90     Mono #     0.7     Mono%     12.1     MPV     9.4     Nitrite, UA     Negative     Non-HDL Cholesterol 187  Comment:  Risk category and Non-HDL  cholesterol goals:  Coronary heart disease (CHD)or equivalent (10-year risk of CHD >20%):  Non-HDL cholesterol goal     <130 mg/dL  Two or more CHD risk factors and 10-year risk of CHD <= 20%:  Non-HDL cholesterol goal     <160 mg/dL  0 to 1 CHD risk factor:  Non-HDL cholesterol goal     <190 mg/dL           Occult Blood UA     Negative     Opiate Scrn, Ur     Negative     pH, UA     6.0     Platelets     187     Potassium     4.0     Total Protein     7.6     Protein, UA     Negative  Comment:  Recommend a 24 hour urine protein or a urine   protein/creatinine ratio if globulin induced proteinuria is  clinically suspected.       RBC     4.87     RDW     12.4     Salicylate Lvl     <5.0  Comment:  Toxic:  30.0 - 70.0 mg/dl  Lethal: >70.0 mg/dl       Sodium     140     Specific Gravity, UA     <=1.005     Specimen UA     Urine, Clean Catch     Marijuana (THC) Metabolite     Negative     Total Cholesterol/HDL Ratio 5.7         Toxicology Information     SEE COMMENT  Comment:  This screen includes the following classes of drugs at the   listed cut-off:  Benzodiazepines                  200 ng/ml  Methadone                        300 ng/ml  Cocaine metabolite               300 ng/ml  Opiates                          300 ng/ml  Barbiturates                     200 ng/ml  Amphetamines                    1000 ng/ml  Marijuana metabs (THC)            50 ng/ml  Phencyclidine (PCP)               25 ng/ml  High concentrations of Diphenhydramine may cross-react with  Phencyclidine PCP screening immunoassay giving a false   positive result.  High concentrations of Methylenedioxymethamphetamine (MDMA aka  Ectasy) and other structurally similar compounds may cross-   react with the Amphetamine/Methamphetamine screening   immunoassay giving a false positive result.  A metabolite of the anti-HIV drug Sustiva () may cause  false positive results in the Marijuana metabolite (THC)   screening assay.  Note: This exception list  includes only more common   interferants in toxicology screen testing.  Because of many   cross-reactantspositive results on toxicology drug screens   should be confirmed whenever results do not correlate with   clinical presentation.  This report is intended for use in clinical monitoring and  management of patients. It is not intended for use in   employment related drug testing.  Because of any cross-reactants, positive results on toxicology  drug screens should be confirmed whenever results do not  correlate with clinical presentation.  Presumptive positive results are unconfirmed and may be used   only for medical purposes.       Triglycerides 228  Comment:  The National Cholesterol Education Program (NCEP) has set the  following guidelines (reference values) for triglycerides:  Normal......................<150 mg/dL  Borderline High.............150-199 mg/dL  High........................200-499 mg/dL           TSH     0.891     Urobilinogen, UA     Negative     WBC     5.45           Significant Imaging: None

## 2018-10-23 NOTE — ASSESSMENT & PLAN NOTE
- pt complains of persistent insomnia  - Melatonin and vistaril don't work, trazodone and seroquel make him a zombie, only lunesta and ambien work according to the pt.  - Ramelteon 8 mg prn nightly

## 2018-10-23 NOTE — ASSESSMENT & PLAN NOTE
- pt endorses panic attacks and PHU  Likely, 2/2 substance use  - states only things that work are benzos, was previously on ativan and klonopin, has been out of klonopin for 3 months  - pt has been self medicating with etoh  - Continue mirtazapine 15 mg po qhs

## 2018-10-23 NOTE — HOSPITAL COURSE
"10/23/2018   Patient calm and cooperative with interview. Dressed in hospital scrub top and jeans. Mood is "ok, I am a little depressed because I am here." Patient provides superficial responses initially. Reports previous paranoid thoughts of neighbors "talking about me." Reports anxiety is manageable today, but no longer experiencing panic. Denies SI, HI, AVH. Discussed patient's previous substance use. Patient was started on Zyprexa 15 mg at alcohol detoxification center. Patient reports this helps with AHs and paranoia. Per mother, patient has no history of manic epidoses. Patient was started a substance rehabilitation IOP in [place] this September. Patient recently got an apartment in [place] (10/01/18) and lives with a roommate. He commutes to [place] for IOP. Patient has been medication compliant, no medication side-effects reported. Received ramelteon 8 mg po PRN for insomnia in the past 24 hours. Reports sleeping great and eating well. Denies palpitations, SOB or n/v. Endorses history of right LE sciatica for which Lyrica is efficacious in treating pain.    10/24/2018   Patient calm and cooperative with interview, slightly fidgety. Mood is "10 times better than before, I am so ready to get back into the world and embrace my sobriety". Denies SI, HI, AVH. Patient has been medication compliant, no medication side-effects reported. Received no psychiatric PRNs in the past 24 hours. Reports sleeping and eating well. No somatic complaints, no other complaints during interview.      "
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
## 2018-10-23 NOTE — PROGRESS NOTES
Group Psychotherapy (PhD/LCSW)    Site: [facility]    Clinical status of patient: Inpatient    Date: 10/23/2018    Group Focus: Life Skills    Length of service: 36960 - 35-40 minutes    Number of patients in attendance: 9    Referred by: Acute Psychiatry Unit Treatment Team    Target symptoms: Alcohol Abuse, Anxiety and Mood Disorder    Patient's response to treatment: Active Listening    Progress toward goals: Progressing Slowly     Interval History: Pt appeared alert and attentive in group. Pt participated briefly in a group discussion of stress management strategies including those involving self-care, self-talk, and challenging maladaptive beliefs. Pt was more alert and attentive today than yesterday in group.       Diagnosis: Substance Induced Mood d/o; Substance Induced Psychotic d/o, Anxiety, Alcohol dependence    Plan: Continue treatment on APU

## 2018-10-23 NOTE — PLAN OF CARE
10/23/18 1500   [facility] Group Therapy   Group Name Education   Specific Interventions Coping Skills Training   Participation Level Appropriate;Attentive;Sharing   Participation Quality Cooperative   Insight/Motivation Applies New Skills;Good;Improved   Affect/Mood Display Appropriate   Cognition Oriented   Psychomotor WNL

## 2018-10-24 VITALS
RESPIRATION RATE: 16 BRPM | HEIGHT: 73 IN | BODY MASS INDEX: 24.77 KG/M2 | HEART RATE: 67 BPM | TEMPERATURE: 98 F | DIASTOLIC BLOOD PRESSURE: 55 MMHG | OXYGEN SATURATION: 98 % | WEIGHT: 186.94 LBS | SYSTOLIC BLOOD PRESSURE: 99 MMHG

## 2018-10-24 PROBLEM — F19.94 SUBSTANCE INDUCED MOOD DISORDER: Status: RESOLVED | Noted: 2018-10-22 | Resolved: 2018-10-24

## 2018-10-24 PROBLEM — F29 PSYCHOSIS: Status: RESOLVED | Noted: 2018-10-21 | Resolved: 2018-10-24

## 2018-10-24 PROBLEM — F19.959 SUBSTANCE-INDUCED PSYCHOTIC DISORDER: Status: RESOLVED | Noted: 2018-10-22 | Resolved: 2018-10-24

## 2018-10-24 LAB
HIV UQ DATE RECEIVED: NORMAL
HIV UQ DATE REPORTED: NORMAL
HIV1 RNA # SERPL NAA+PROBE: <40 COPIES/ML
HIV1 RNA SERPL NAA+PROBE-LOG#: <1.6 LOG (10) COPIES/ML
HIV1 RNA SERPL QL NAA+PROBE: NOT DETECTED

## 2018-10-24 PROCEDURE — 25000003 PHARM REV CODE 250: Performed by: INTERNAL MEDICINE

## 2018-10-24 PROCEDURE — 90853 GROUP PSYCHOTHERAPY: CPT | Mod: ,,, | Performed by: PSYCHOLOGIST

## 2018-10-24 PROCEDURE — S4991 NICOTINE PATCH NONLEGEND: HCPCS | Performed by: STUDENT IN AN ORGANIZED HEALTH CARE EDUCATION/TRAINING PROGRAM

## 2018-10-24 PROCEDURE — 25000003 PHARM REV CODE 250: Performed by: STUDENT IN AN ORGANIZED HEALTH CARE EDUCATION/TRAINING PROGRAM

## 2018-10-24 PROCEDURE — 99239 HOSP IP/OBS DSCHRG MGMT >30: CPT | Mod: ,,, | Performed by: PSYCHIATRY & NEUROLOGY

## 2018-10-24 PROCEDURE — 25000003 PHARM REV CODE 250: Performed by: PSYCHIATRY & NEUROLOGY

## 2018-10-24 RX ORDER — FOLIC ACID 1 MG/1
1 TABLET ORAL DAILY
Qty: 30 TABLET | Refills: 0
Start: 2018-10-25 | End: 2023-07-14

## 2018-10-24 RX ORDER — OLANZAPINE 15 MG/1
15 TABLET ORAL NIGHTLY
Qty: 30 TABLET | Refills: 0 | Status: SHIPPED | OUTPATIENT
Start: 2018-10-24 | End: 2023-07-14

## 2018-10-24 RX ORDER — OLANZAPINE 15 MG/1
15 TABLET ORAL NIGHTLY
Qty: 30 TABLET | Refills: 0
Start: 2018-10-24 | End: 2018-10-24

## 2018-10-24 RX ORDER — PREGABALIN 150 MG/1
150 CAPSULE ORAL 2 TIMES DAILY
Qty: 60 CAPSULE | Refills: 0
Start: 2018-10-24 | End: 2018-10-24

## 2018-10-24 RX ORDER — MIRTAZAPINE 15 MG/1
15 TABLET, ORALLY DISINTEGRATING ORAL NIGHTLY
Qty: 30 TABLET | Refills: 11
Start: 2018-10-24 | End: 2018-10-24

## 2018-10-24 RX ORDER — MIRTAZAPINE 15 MG/1
15 TABLET, ORALLY DISINTEGRATING ORAL NIGHTLY
Qty: 30 TABLET | Refills: 0 | Status: SHIPPED | OUTPATIENT
Start: 2018-10-24 | End: 2023-07-14

## 2018-10-24 RX ORDER — PREGABALIN 150 MG/1
150 CAPSULE ORAL 2 TIMES DAILY
Qty: 60 CAPSULE | Refills: 0
Start: 2018-10-24 | End: 2018-11-23

## 2018-10-24 RX ADMIN — EMTRICITABINE 200 MG: 200 CAPSULE ORAL at 08:10

## 2018-10-24 RX ADMIN — PREGABALIN 150 MG: 100 CAPSULE ORAL at 08:10

## 2018-10-24 RX ADMIN — TENOFOVIR DISOPROXIL FUMARATE 300 MG: 300 TABLET, FILM COATED ORAL at 08:10

## 2018-10-24 RX ADMIN — RITONAVIR 100 MG: 100 TABLET, FILM COATED ORAL at 08:10

## 2018-10-24 RX ADMIN — NICOTINE 1 PATCH: 7 PATCH, EXTENDED RELEASE TRANSDERMAL at 08:10

## 2018-10-24 RX ADMIN — FOLIC ACID 1 MG: 1 TABLET ORAL at 08:10

## 2018-10-24 RX ADMIN — DARUNAVIR 800 MG: 800 TABLET, FILM COATED ORAL at 08:10

## 2018-10-24 RX ADMIN — THERA TABS 1 TABLET: TAB at 08:10
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
## 2018-10-24 NOTE — SUBJECTIVE & OBJECTIVE
"Interval History: See hospital course    Family History     None        Tobacco Use    Smoking status: Current Some Day Smoker     Types: Cigarettes    Smokeless tobacco: Never Used   Substance and Sexual Activity    Alcohol use: Yes     Comment: occasionally     Drug use: No    Sexual activity: Not on file     Psychotherapeutics (From admission, onward)    Start     Stop Route Frequency Ordered    10/22/18 2100  OLANZapine tablet 15 mg      -- Oral Nightly 10/22/18 1058    10/22/18 2100  mirtazapine disintegrating tablet 15 mg      -- Oral Nightly 10/22/18 1155    10/22/18 0246  LORazepam tablet 2 mg      -- Oral Every 4 hours PRN 10/22/18 0146    10/22/18 0209  ramelteon tablet 8 mg      -- Oral Nightly PRN 10/22/18 0109    10/21/18 2333  OLANZapine tablet 10 mg  (Olanzapine)      -- Oral Every 6 hours PRN 10/21/18 2333    10/21/18 2333  OLANZapine injection 10 mg  (Olanzapine)      -- IM Every 6 hours PRN 10/21/18 2333           Review of Systems   Cardiovascular: Negative for palpitations.   Gastrointestinal: Negative for nausea and vomiting.     Objective:     Vital Signs (Most Recent):  Temp: 98.4 °F (36.9 °C) (10/24/18 0729)  Pulse: 67 (10/24/18 0729)  Resp: 16 (10/24/18 0729)  BP: (!) 99/55 (10/24/18 0729)  SpO2: 98 % (10/22/18 0111) Vital Signs (24h Range):  Temp:  [98.4 °F (36.9 °C)-98.9 °F (37.2 °C)] 98.4 °F (36.9 °C)  Pulse:  [67-72] 67  Resp:  [16] 16  BP: ()/(55-79) 99/55     Height: 6' 1" (185.4 cm)  Weight: 84.8 kg (186 lb 15.2 oz)  Body mass index is 24.67 kg/m².    No intake or output data in the 24 hours ending 10/24/18 0947  Mental Status Exam:  Appearance: normal weight, fair grooming, slightly fidgety  Behavior/Cooperation: friendly and cooperative,    Speech: normal tone, normal rate, normal pitch, normal volume  Orientation: grossly intact  Attention Span/Concentration: intact  Memory: Grossly intact  Mood: "10 times better."  Affect: mood congruent   Thought Process: logical, " linear   Thought Content: normal, no suicidality, no homicidality, delusions, or paranoia  Fund of Knowledge: Aware of current events and Vocabulary appropriate   Abstraction: able to abstract  Insight: fair, minimizes substance use  Judgment: appropriate for setting, fair regarding plan to remain sober         Significant Labs:   Last 72 Hours:   Recent Lab Results  (Last 5 results in the past 72 hours)      10/22/18  1846   10/22/18  1129   10/22/18  0542   10/21/18  1649   10/21/18  1322        Benzodiazepines         Negative     Methadone metabolites         Negative     Phencyclidine         Negative     Absolute CD4   605  Comment:  This test was developed and its performance characteristics   determined by [facility].    It has not been cleared or approved by the U.S. Food and Drug   Administration. The FDA has determined that such clearance or   approval is not necessary.  This test is used for clinical purposes.    It should not be regarded as investigational or for research.  This   laboratory is certified under CLIA-88 as qualified to perform high   complexity clinical laboratory testing.             Acetaminophen (Tylenol), Serum         <3.0  Comment:  Toxic Levels:  Adults (4 hr post-ingestion).........>150 ug/mL  Adults (12 hr post-ingestion)........>40 ug/mL  Peds (2 hr post-ingestion, liquid)...>225 ug/mL       Albumin         4.5     Alcohol, Medical, Serum       48 133     Alkaline Phosphatase         78     ALT         22     Amphetamine Screen, Ur         Negative     Anion Gap         6     Appearance, UA         Clear     AST         15     Barbiturate Screen, Ur         Negative     Baso #         0.02     Basophil%         0.4     Bilirubin (UA)         Negative     Total Bilirubin         0.4  Comment:  For infants and newborns, interpretation of results should be based  on gestational age, weight and in agreement with  clinical  observations.  Premature Infant recommended reference ranges:  Up to 24 hours.............<8.0 mg/dL  Up to 48 hours............<12.0 mg/dL  3-5 days..................<15.0 mg/dL  6-29 days.................<15.0 mg/dL       BUN, Bld         17     Calcium         10.0     CD4 % [place] T Cell   25.0           Chloride         107     Cholesterol     227  Comment:  The National Cholesterol Education Program (NCEP) has set the  following guidelines (reference ranges) for Cholesterol:  Optimal.....................<200 mg/dL  Borderline High.............200-239 mg/dL  High........................> or = 240 mg/dL           CO2         27     Cocaine (Metab.)         Negative     Color, UA         Yellow     Creatinine         1.0     Creatinine, Random Ur         14.1  Comment:  The random urine reference ranges provided were established   for 24 hour urine collections.  No reference ranges exist for  random urine specimens.  Correlate clinically.       Differential Method         Automated     eGFR if          >60     eGFR if non          >60  Comment:  Calculation used to obtain the estimated glomerular filtration  rate (eGFR) is the CKD-EPI equation.        Eos #         0.1     Eosinophil%         1.7     Estimated Avg Glucose     91         Glucose         102     Glucose, UA         Negative     Gran # (ANC)         2.8     Gran%         51.8     HDL     40  Comment:  The National Cholesterol Education Program (NCEP) has set the  following guidelines (reference values) for HDL Cholesterol:  Low...............<40 mg/dL  Optimal...........>60 mg/dL           HDL/Chol Ratio     17.6         Hematocrit         43.8     Hemoglobin         15.7     Hemoglobin A1C     4.8  Comment:  ADA Screening Guidelines:  5.7-6.4%  Consistent with prediabetes  >or=6.5%  Consistent with diabetes  High levels of fetal hemoglobin interfere with the HbA1C  assay. Heterozygous hemoglobin variants  (HbS, HgC, etc)do  not significantly interfere with this assay.   However, presence of multiple variants may affect accuracy.           Ketones, UA         Negative     LDL Cholesterol     141.4  Comment:  The National Cholesterol Education Program (NCEP) has set the  following guidelines (reference values) for LDL Cholesterol:  Optimal.......................<130 mg/dL  Borderline High...............130-159 mg/dL  High..........................160-189 mg/dL  Very High.....................>190 mg/dL           Leukocytes, UA         Negative     Lymph #         1.8     Lymph%         33.6     MCH         32.2     MCHC         35.8     MCV         90     Mono #         0.7     Mono%         12.1     MPV         9.4     Nitrite, UA         Negative     Non-HDL Cholesterol     187  Comment:  Risk category and Non-HDL cholesterol goals:  Coronary heart disease (CHD)or equivalent (10-year risk of CHD >20%):  Non-HDL cholesterol goal     <130 mg/dL  Two or more CHD risk factors and 10-year risk of CHD <= 20%:  Non-HDL cholesterol goal     <160 mg/dL  0 to 1 CHD risk factor:  Non-HDL cholesterol goal     <190 mg/dL           Occult Blood UA         Negative     Opiate Scrn, Ur         Negative     pH, UA         6.0     Platelets         187     Potassium         4.0     Total Protein         7.6     Protein, UA         Negative  Comment:  Recommend a 24 hour urine protein or a urine   protein/creatinine ratio if globulin induced proteinuria is  clinically suspected.       RBC         4.87     RDW         12.4     RPR Non-reactive             Salicylate Lvl         <5.0  Comment:  Toxic:  30.0 - 70.0 mg/dl  Lethal: >70.0 mg/dl       Sodium         140     Specific Gravity, UA         <=1.005     Specimen UA         Urine, Clean Catch     Marijuana (THC) Metabolite         Negative     Total Cholesterol/HDL Ratio     5.7         Toxicology Information         SEE COMMENT  Comment:  This screen includes the following classes  of drugs at the   listed cut-off:  Benzodiazepines                  200 ng/ml  Methadone                        300 ng/ml  Cocaine metabolite               300 ng/ml  Opiates                          300 ng/ml  Barbiturates                     200 ng/ml  Amphetamines                    1000 ng/ml  Marijuana metabs (THC)            50 ng/ml  Phencyclidine (PCP)               25 ng/ml  High concentrations of Diphenhydramine may cross-react with  Phencyclidine PCP screening immunoassay giving a false   positive result.  High concentrations of Methylenedioxymethamphetamine (MDMA aka  Ectasy) and other structurally similar compounds may cross-   react with the Amphetamine/Methamphetamine screening   immunoassay giving a false positive result.  A metabolite of the anti-HIV drug Sustiva () may cause  false positive results in the Marijuana metabolite (THC)   screening assay.  Note: This exception list includes only more common   interferants in toxicology screen testing.  Because of many   cross-reactantspositive results on toxicology drug screens   should be confirmed whenever results do not correlate with   clinical presentation.  This report is intended for use in clinical monitoring and  management of patients. It is not intended for use in   employment related drug testing.  Because of any cross-reactants, positive results on toxicology  drug screens should be confirmed whenever results do not  correlate with clinical presentation.  Presumptive positive results are unconfirmed and may be used   only for medical purposes.       Triglycerides     228  Comment:  The National Cholesterol Education Program (NCEP) has set the  following guidelines (reference values) for triglycerides:  Normal......................<150 mg/dL  Borderline High.............150-199 mg/dL  High........................200-499 mg/dL           TSH         0.891     Urobilinogen, UA         Negative     WBC         5.45                           Significant Imaging: None

## 2018-10-24 NOTE — ASSESSMENT & PLAN NOTE
- Pt complains of persistent insomnia  - Continue Mirtazpaine 15 mg po qhs  Efficacious on the unit

## 2018-10-24 NOTE — ASSESSMENT & PLAN NOTE
- Endorses last manic episode was 3 weeks ago, currently depressed and anxious  - Endorses paranoid thoughts of neighbors talking negatively about him  - Zyprexa 15 mg nightly    Patient no longer endorses paranoid thoughts or symptoms of lurdes.

## 2018-10-24 NOTE — NURSING
Patient discharged to home.  Patient transported per [employer].  Mood and behavior is appropriate.  Reviewed discharge instructions with patient.  He verbalized understanding.  Prescriptions given to patient.  All belongings sent with patient.

## 2018-10-24 NOTE — DISCHARGE SUMMARY
[facility]  Psychiatry  Discharge Summary      Patient Name: Flako Pearce  MRN: [id number]   Admission Date: 10/21/2018  Hospital Length of Stay: 3 days  Discharge Date and Time:  10/24/2018 11:31 AM  Attending Physician: Andrew Anderson MD   Discharging Provider: Sameer Benitez DO  Primary Care Provider: Primary Doctor No    HPI:   Inpatient Psychiatry History & Physical      Chief Complaint / Reason for Consult:     suicidal ideation and anxiety     Subjective:     History of Present Illness:   Flako Pearce is a 28 y.o. male with a history of panic attacks, bipolar I disorder, and PHU who presented to [facility] due to panic attacks, anxiety, SI, depression.     Per ED MD:  This is a 28 y.o. male, with history of HIV, who presents with complaint of panic attacks starting five days ago. He reports that he has not left his home in five days because of fear of what will happen if he does leave. He also experiences palpitations and auditory hallucinations telling him not to leave his home. He reports that while at home, he tries his best to sleep. He has passive suicidal ideations, with no plan in place. He denies thought of overdose. He denies fever, chills, rhinorrhea, SOB, CP, abdominal pain, N/V/D, any urinary symptoms, back pain, headache, or weakness. He attempted suicide approximately 1.5 years ago, where he cut his wrists. He reports that he was hospitalized on 8-5-18 for similar symptoms, where he experienced uncontrollable thoughts and panic attacks. He denies any recent illness. He believes that his olanzapine has stopped working. He also takes Zyprexa. He has not taken any Xanax recently. He also takes medicine for HIV.      He states that he is scared to talk to anyone about his anxiety. He has a roommate who is never home. His parents live in [place], but they are currently out of town in [place]. He also has a friend in [place]. He last saw his psychiatrist, Dr. oRwley,  in [place] a few weeks ago. He has not sought out resources in [place]. 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 He reports ETOH use last night, but no illicit drug use.    Inpatient interview:  Pt originally came to the ED at [facility] for help with his anxiety and panic attacks.  Pt last had a psychiatrist in [place], was on Klonopin daily and zyprexa 15 mg nightly.  Pt has been out of his benzos for months, self medicating with alcohol.  Pt states his last manic episode was three weeks ago, had decreased need for sleep, spent a lot of money on shopping.  Pt reports that he had auditory hallucinations a few months ago when he could not sleep, states the voices are his friends, does not currently endorse.  Pt denies current suicidality, states he felt that way earlier in the day due to the overwhelming anxiety.  Pt has had two previous suicide attempts, both by cutting his wrists. Pt currently complains of insomnia, no physical complaints.      Collateral:   none    Medical Review Of Systems:  Pertinent items are noted in HPI.    Psychiatric Review Of Systems - Is patient experiencing or having changes in:  sleep: yes  appetite: no  weight: no  energy/anergy: yes  interest/pleasure/anhedonia: yes  somatic symptoms: yes  libido: no  anxiety/panic: yes  guilty/hopelessness: yes  concentration: no  S.I.B.s/risky behavior: no  any drugs: no  alcohol: yes     Allergies:  Patient has no known allergies.    Past Medical/Surgical History  Past Medical History:   Diagnosis Date    Bipolar 1 disorder     PHU (generalized anxiety disorder)     Panic disorder      History reviewed. No pertinent surgical history.    Past Psychiatric History:  Previous Medication Trials: yes   Previous Psychiatric Hospitalizations: yes   Previous Suicide Attempts: yes x 2, most recent 1.5 years ago  History of Violence: no  Outpatient Psychiatrist: no, not currently in  [place]    Social History:  Marital Status: not   Children: 0   Employment Status/Info: recently quit his job, plans to move back in with his parents  Education: college graduate  Special Ed: no  Housing Status: in apartment with friend  History of phys/sexual abuse: no  Access to gun: unknown    Substance Abuse History:  Recreational Drugs: denies  Use of Alcohol: moderate  Rehab History: no   Tobacco Use: no  Use of Caffeine: denies use  Use of OTC: denies  Legal consequences of chemical use: no    Legal History:  Past Charges/Incarcerations: no,     Pending charges: no     Family Psychiatric History:   denies    Objective:     Current Medications:  Infusions:    Scheduled:   folic acid  1 mg Oral Daily    multivitamin  1 tablet Oral Daily    nicotine  1 patch Transdermal Daily     PRN:  OLANZapine **AND** OLANZapine, ramelteon    Home Medications:  Prior to Admission medications    Medication Sig Start Date End Date Taking? Authorizing Provider   darunavir-cobicistat (PREZCOBIX) 800-150 mg-mg Tab Take by mouth.    Historical Provider, MD   emtricitabine-tenofovir alafen (DESCOVY) 200-25 mg Tab Take by mouth.    Historical Provider, MD   OLANZapine (ZYPREXA) 10 MG tablet Take 10 mg by mouth every evening.    Historical Provider, MD     Vital Signs:  Temp:  [97.9 °F (36.6 °C)-98.1 °F (36.7 °C)]   Pulse:  []   Resp:  [15-18]   BP: (120-136)/(70-75)   SpO2:  [96 %-98 %]     Physical Exam:  Gen: Alert, calm, cooperative, NAD   Head: NCAT, PERRL, EOMI, MMM   Lungs: CTAB, respirations unlabored   Chest wall: No tenderness or deformity   Heart: RRR, S1/S2 normal, no M/R/G   Abdomen: S/NT/ND, +BS, no HSM, no masses   Extremities: Extremities normal, atraumatic, no cyanosis or edema   Pulses: 2+ and symmetric all extremities   Skin: Skin color, texture, turgor normal; no rashes or lesions   Neurologic: CN II-XII grossly intact, normal strength, sensations and reflexes throughout     Mental Status  "Exam:  Appearance: unremarkable, age appropriate   Behavior: normal, cooperative, restless and fidgety    Speech/Language: normal tone, normal rate, normal pitch, normal volume   Mood: euthymic   Affect: mood congruent   Thought Process:  normal and logical   Thought Content: normal, no suicidality, no homicidality, delusions, or paranoia   Orientation: grossly intact   Cognition: grossly intact   Insight: fair   Judgment: fair       Hospital Course:   10/23/2018   Patient calm and cooperative with interview. Dressed in hospital scrub top and jeans. Mood is "ok, I am a little depressed because I am here." Patient provides superficial responses initially. Reports previous paranoid thoughts of neighbors "talking about me." Reports anxiety is manageable today, but no longer experiencing panic. Denies SI, HI, AVH. Discussed patient's previous substance use. Patient was started on Zyprexa 15 mg at alcohol detoxification center. Patient reports this helps with AHs and paranoia. Per mother, patient has no history of manic epidoses. Patient was started a substance rehabilitation IOP in [place] this September. Patient recently got an apartment in [place] (10/01/18) and lives with a roommate. He commutes to [place] for IOP. Patient has been medication compliant, no medication side-effects reported. Received ramelteon 8 mg po PRN for insomnia in the past 24 hours. Reports sleeping great and eating well. Denies palpitations, SOB or n/v. Endorses history of right LE sciatica for which Lyrica is efficacious in treating pain.    10/24/2018   Patient calm and cooperative with interview, slightly fidgety. Mood is "10 times better than before, I am so ready to get back into the world and embrace my sobriety". Denies SI, HI, AVH. Patient has been medication compliant, no medication side-effects reported. Received no psychiatric PRNs in the past 24 hours. Reports sleeping and eating well. No somatic complaints, no other " complaints during interview.         * No surgery found *     Consults:   Consults (From admission, onward)        Status Ordering Provider     Inpatient consult to Infectious Diseases  Once     Provider:  (Not yet assigned)    Completed IKE HARRISON        Physical Exam   Appearance: normal weight, fair grooming, slightly disheveled  Behavior/Cooperation: guarded at times, reluctant to provide information regarding substance use  Speech: normal tone, normal rate, normal pitch, normal volume  Orientation: grossly intact  Attention Span/Concentration: intact  Memory: Grossly intact  Mood: euthymic  Affect: constricted  Thought Process: logical, perseverative on discharge  Thought Content: normal, no suicidality, no homicidality, delusions, or paranoia  Fund of Knowledge: Aware of current events and Vocabulary appropriate   Abstraction: able to abstract  Insight: fair, minimizes substance use  Judgment: appropriate for setting, fair regarding plan to remain sober         Significant Labs:   Last 72 Hours:   Recent Lab Results  (Last 5 results in the past 72 hours)      10/22/18  1846   10/22/18  1129   10/22/18  0542   10/21/18  1649   10/21/18  1322        Benzodiazepines         Negative     Methadone metabolites         Negative     Phencyclidine         Negative     Absolute CD4   605  Comment:  This test was developed and its performance characteristics   determined by [facility].    It has not been cleared or approved by the U.S. Food and Drug   Administration. The FDA has determined that such clearance or   approval is not necessary.  This test is used for clinical purposes.    It should not be regarded as investigational or for research.  This   laboratory is certified under CLIA-88 as qualified to perform high   complexity clinical laboratory testing.             Acetaminophen (Tylenol), Serum         <3.0  Comment:  Toxic Levels:  Adults (4 hr  post-ingestion).........>150 ug/mL  Adults (12 hr post-ingestion)........>40 ug/mL  Peds (2 hr post-ingestion, liquid)...>225 ug/mL       Albumin         4.5     Alcohol, Medical, Serum       48 133     Alkaline Phosphatase         78     ALT         22     Amphetamine Screen, Ur         Negative     Anion Gap         6     Appearance, UA         Clear     AST         15     Barbiturate Screen, Ur         Negative     Baso #         0.02     Basophil%         0.4     Bilirubin (UA)         Negative     Total Bilirubin         0.4  Comment:  For infants and newborns, interpretation of results should be based  on gestational age, weight and in agreement with clinical  observations.  Premature Infant recommended reference ranges:  Up to 24 hours.............<8.0 mg/dL  Up to 48 hours............<12.0 mg/dL  3-5 days..................<15.0 mg/dL  6-29 days.................<15.0 mg/dL       BUN, Bld         17     Calcium         10.0     CD4 % Engelhard T Cell   25.0           Chloride         107     Cholesterol     227  Comment:  The National Cholesterol Education Program (NCEP) has set the  following guidelines (reference ranges) for Cholesterol:  Optimal.....................<200 mg/dL  Borderline High.............200-239 mg/dL  High........................> or = 240 mg/dL           CO2         27     Cocaine (Metab.)         Negative     Color, UA         Yellow     Creatinine         1.0     Creatinine, Random Ur         14.1  Comment:  The random urine reference ranges provided were established   for 24 hour urine collections.  No reference ranges exist for  random urine specimens.  Correlate clinically.       Differential Method         Automated     eGFR if          >60     eGFR if non          >60  Comment:  Calculation used to obtain the estimated glomerular filtration  rate (eGFR) is the CKD-EPI equation.        Eos #         0.1     Eosinophil%         1.7     Estimated Avg  Glucose     91         Glucose         102     Glucose, UA         Negative     Gran # (ANC)         2.8     Gran%         51.8     HDL     40  Comment:  The National Cholesterol Education Program (NCEP) has set the  following guidelines (reference values) for HDL Cholesterol:  Low...............<40 mg/dL  Optimal...........>60 mg/dL           HDL/Chol Ratio     17.6         Hematocrit         43.8     Hemoglobin         15.7     Hemoglobin A1C     4.8  Comment:  ADA Screening Guidelines:  5.7-6.4%  Consistent with prediabetes  >or=6.5%  Consistent with diabetes  High levels of fetal hemoglobin interfere with the HbA1C  assay. Heterozygous hemoglobin variants (HbS, HgC, etc)do  not significantly interfere with this assay.   However, presence of multiple variants may affect accuracy.           Ketones, UA         Negative     LDL Cholesterol     141.4  Comment:  The National Cholesterol Education Program (NCEP) has set the  following guidelines (reference values) for LDL Cholesterol:  Optimal.......................<130 mg/dL  Borderline High...............130-159 mg/dL  High..........................160-189 mg/dL  Very High.....................>190 mg/dL           Leukocytes, UA         Negative     Lymph #         1.8     Lymph%         33.6     MCH         32.2     MCHC         35.8     MCV         90     Mono #         0.7     Mono%         12.1     MPV         9.4     Nitrite, UA         Negative     Non-HDL Cholesterol     187  Comment:  Risk category and Non-HDL cholesterol goals:  Coronary heart disease (CHD)or equivalent (10-year risk of CHD >20%):  Non-HDL cholesterol goal     <130 mg/dL  Two or more CHD risk factors and 10-year risk of CHD <= 20%:  Non-HDL cholesterol goal     <160 mg/dL  0 to 1 CHD risk factor:  Non-HDL cholesterol goal     <190 mg/dL           Occult Blood UA         Negative     Opiate Scrn, Ur         Negative     pH, UA         6.0     Platelets         187     Potassium         4.0      Total Protein         7.6     Protein, UA         Negative  Comment:  Recommend a 24 hour urine protein or a urine   protein/creatinine ratio if globulin induced proteinuria is  clinically suspected.       RBC         4.87     RDW         12.4     RPR Non-reactive             Salicylate Lvl         <5.0  Comment:  Toxic:  30.0 - 70.0 mg/dl  Lethal: >70.0 mg/dl       Sodium         140     Specific Gravity, UA         <=1.005     Specimen UA         Urine, Clean Catch     Marijuana (THC) Metabolite         Negative     Total Cholesterol/HDL Ratio     5.7         Toxicology Information         SEE COMMENT  Comment:  This screen includes the following classes of drugs at the   listed cut-off:  Benzodiazepines                  200 ng/ml  Methadone                        300 ng/ml  Cocaine metabolite               300 ng/ml  Opiates                          300 ng/ml  Barbiturates                     200 ng/ml  Amphetamines                    1000 ng/ml  Marijuana metabs (THC)            50 ng/ml  Phencyclidine (PCP)               25 ng/ml  High concentrations of Diphenhydramine may cross-react with  Phencyclidine PCP screening immunoassay giving a false   positive result.  High concentrations of Methylenedioxymethamphetamine (MDMA aka  Ectasy) and other structurally similar compounds may cross-   react with the Amphetamine/Methamphetamine screening   immunoassay giving a false positive result.  A metabolite of the anti-HIV drug Sustiva () may cause  false positive results in the Marijuana metabolite (THC)   screening assay.  Note: This exception list includes only more common   interferants in toxicology screen testing.  Because of many   cross-reactantspositive results on toxicology drug screens   should be confirmed whenever results do not correlate with   clinical presentation.  This report is intended for use in clinical monitoring and  management of patients. It is not intended for use in   employment  related drug testing.  Because of any cross-reactants, positive results on toxicology  drug screens should be confirmed whenever results do not  correlate with clinical presentation.  Presumptive positive results are unconfirmed and may be used   only for medical purposes.       Triglycerides     228  Comment:  The National Cholesterol Education Program (NCEP) has set the  following guidelines (reference values) for triglycerides:  Normal......................<150 mg/dL  Borderline High.............150-199 mg/dL  High........................200-499 mg/dL           TSH         0.891     Urobilinogen, UA         Negative     WBC         5.45                          Significant Imaging: None     Smoking Cessation:   Does the patient smoke? Yes  Does the patient want a prescription for Smoking Cessation? No  Does the patient want counseling for Smoking Cessation? No     Alcohol Usage:   Have you expressed concern for patient unhealthy alcohol use?  Yes  Have you provided feedback linking patients drinking to his/her health issue(s)?  Yes  Has patient received education about recommended drinking limits?  Yes  Has patient been advised to cut down drinking or abstain from drinking?  Yes  Has patient been referred for treatment if indicated?  Yes       Pending Diagnostic Studies:     Procedure Component Value Units Date/Time    HIV RNA, quantitative, PCR [[id number]] Collected:  10/22/18 1846    Order Status:  Sent Lab Status:  In process Updated:  10/22/18 1933    Specimen:  Blood         Final Active Diagnoses:    Diagnosis Date Noted POA    Anxiety [F41.9]  Yes    Alcohol use disorder, moderate, in controlled environment [F10.20] 10/22/2018 Yes    Insomnia [G47.00] 10/22/2018 Yes    HIV disease [B20] 10/22/2018 Unknown     Chronic      Problems Resolved During this Admission:    Diagnosis Date Noted Date Resolved POA    PRINCIPAL PROBLEM:  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     HIV disease    - Continue home medications (descovy and prezcobix ) in outpatient setting  -   Per Infectious Disease:  -follow up w/ Dr. Rogelio Jung [id number], [place] [address] - [phone]         Insomnia    - Pt complains of persistent insomnia  - Continue Mirtazpaine 15 mg po qhs  Efficacious on the unit     Alcohol use disorder, moderate, in controlled environment    - pt had ISMAEL of 133 when he presented to the ED  - no history of complicated withdrawal, VSS, pt is not tremulous, states he is not a daily drinker         Substance induced mood disorder-resolved as of 10/24/2018    - pt endorses panic attacks and PHU  Likely, 2/2 substance use  - states only things that work are benzos, was previously on ativan and klonopin, has been out of klonopin for 3 months  - pt has been self medicating with etoh  - Continue mirtazapine 15 mg po qhs    Patient no longer endorses symptoms of anxiety or panic attacks.         Functional Condition: Independent ambulation, Independent communication skills, Can read/write, Able to access transportation , Independent with ADLs and basic life skills, Able to obtain/access community resources and Able to participate in aftercare arrangements independently    Discharged Condition: stable    Disposition:     Follow Up:  Follow-up Information     [facility] Go on 10/25/2018.    Why:  Orientation is Everyday Monday - Friday at 8:30. Patient will need to go to facility on 10/25 at 8:30 to reestablish care for medication management, therapy and IOP.  Contact  information:  [address] [place] [address]           Rogelio Jung MD. Go on 10/29/2018.    Specialty:  Infectious Diseases  Why:  Patient is to report to facility at 1:15 for medical follow up.  Contact information:  [address]  [place] [address]  [phone]                 Patient Instructions:   No discharge procedures on file.  Medications:  Reconciled Home Medications:      Medication List      START taking these medications    folic acid 1 MG tablet  Commonly known as:  FOLVITE  Take 1 tablet (1 mg total) by mouth once daily.  Start taking on:  10/25/2018     mirtazapine 15 MG disintegrating tablet  Commonly known as:  REMERON SOL-TAB  Take 1 tablet (15 mg total) by mouth nightly.     multivitamin tablet  Commonly known as:  THERAGRAN  Take 1 tablet by mouth once daily.  Start taking on:  10/25/2018     pregabalin 150 MG capsule  Commonly known as:  LYRICA  Take 1 capsule (150 mg total) by mouth 2 (two) times daily.        CHANGE how you take these medications    OLANZapine 15 MG Tab  Commonly known as:  ZyPREXA  Take 1 tablet (15 mg total) by mouth every evening.  What changed:    · medication strength  · how much to take        CONTINUE taking these medications    DESCOVY 200-25 mg Tab  Generic drug:  emtricitabine-tenofovir alafen  Take by mouth.     PREZCOBIX 800-150 mg-mg Tab  Generic drug:  darunavir-cobicistat  Take by mouth.          Is patient being discharged on multiple antipsychotics? No        Total time:30 with greater than 50% of this time spent in counseling and/or coordination of care.     All elements of the transition record were discussed with the patient at discharge and patient agrees to this plan.    Sameer Benitez, DO  Psychiatry  [facility]

## 2018-10-24 NOTE — ASSESSMENT & PLAN NOTE
- pt had ISMAEL of 133 when he presented to the ED  - no history of complicated withdrawal, VSS, pt is not tremulous, states he is not a daily drinker

## 2018-10-24 NOTE — PROGRESS NOTES
Group Psychotherapy (PhD/LCSW)    Site: [facility]    Clinical status of patient: Inpatient    Date: 10/24/2018    Group Focus: Life Skills    Length of service: 76162 - 35-40 minutes    Number of patients in attendance: 10    Referred by: Acute Psychiatry Unit Treatment Team    Target symptoms: Alcohol Abuse, Anxiety and Mood Disorder    Patient's response to treatment: Active Listening    Progress toward goals: Progressing Slowly     Interval History: Pt appeared alert and attentive in group. Pt said he is doing much better today and looking forward to discharge. Pt participated appropriately in a group discussion of of coping with anxiety. He noted that slowing his breathing down helps him.        Diagnosis: Substance Induced Mood d/o; Substance Induced Psychotic d/o, Anxiety, Alcohol dependence    Plan: See Discharge Summary dated 10/24/18

## 2018-10-24 NOTE — ASSESSMENT & PLAN NOTE
- pt endorses panic attacks and PHU  Likely, 2/2 substance use  - states only things that work are benzos, was previously on ativan and klonopin, has been out of klonopin for 3 months  - pt has been self medicating with etoh  - Continue mirtazapine 15 mg po qhs    Patient no longer endorses symptoms of anxiety or panic attacks.

## 2018-10-24 NOTE — PLAN OF CARE
Problem: Patient Care Overview (Adult)  Goal: Plan of Care Review  Outcome: Ongoing (interventions implemented as appropriate)  POC discussed with pt, calm and cooperative on the unit. Follows direction and attends group with active participation. Med compliant, good hygiene,and good appetite. Denies SI/HI/AVH, bright affect, thoughts are future oriented. Mood is good. Out visible on the unit. Safety plan reviewed and environmental rounds done. Reviewed medicine with pt will require further instruction. Pt given time to ask questions, all questions answered. MVC in place will continue to monitor.     Problem: Impaired Control (Excessive Substance Use) (Adult)  Goal: Participates in Recovery Program  Outcome: Ongoing (interventions implemented as appropriate)  Pt is attending group and participating.     Problem: Safety Awareness Impairment (Excessive Substance Use) (Adult)  Goal: Enhanced Safety Awareness  Outcome: Ongoing (interventions implemented as appropriate)  Pt is on withdrawal precautions he has had no signs of withdrawal to date.     Problem: Physiological Impairment (Excessive Substance Use) (Adult)  Goal: Improved Physiologic Symptoms  Outcome: Ongoing (interventions implemented as appropriate)  Pt is not having any signs or symptoms of alcohol withdrawals.

## 2018-10-24 NOTE — ASSESSMENT & PLAN NOTE
- Continue home medications (descovy and prezcobix ) in outpatient setting  -   Per Infectious Disease:  -follow up w/ Dr. Rogelio Jung [address] [place] [address] [address], [place] [address] - [phone]

## 2023-03-19 PROBLEM — B00.9 HSV-2 INFECTION: Status: ACTIVE | Noted: 2023-03-19

## 2023-03-19 PROBLEM — F29 PSYCHOSIS: Status: ACTIVE | Noted: 2023-03-19

## 2023-03-19 PROBLEM — R44.3 HALLUCINATIONS: Status: ACTIVE | Noted: 2023-03-19

## 2023-03-19 PROBLEM — G40.909 SEIZURE DISORDER: Status: ACTIVE | Noted: 2023-03-19

## 2023-03-19 PROBLEM — B20 HIV DISEASE: Status: ACTIVE | Noted: 2023-03-19

## 2023-03-19 PROBLEM — F15.10 AMPHETAMINE ABUSE: Status: ACTIVE | Noted: 2023-03-19

## 2023-03-19 PROBLEM — F12.10 MARIJUANA ABUSE: Status: ACTIVE | Noted: 2023-03-19

## 2023-03-19 PROBLEM — F14.10 COCAINE ABUSE: Status: ACTIVE | Noted: 2023-03-19

## 2023-03-19 PROBLEM — F41.9 ANXIETY DISORDER: Status: ACTIVE | Noted: 2023-03-19

## 2023-03-20 PROBLEM — E44.1 MILD MALNUTRITION: Status: ACTIVE | Noted: 2023-03-20

## 2023-07-13 ENCOUNTER — HOSPITAL ENCOUNTER (INPATIENT)
Facility: HOSPITAL | Age: 33
LOS: 6 days | Discharge: HOME | DRG: 885 | End: 2023-07-19
Attending: EMERGENCY MEDICINE | Admitting: STUDENT IN AN ORGANIZED HEALTH CARE EDUCATION/TRAINING PROGRAM
Payer: MEDICAID

## 2023-07-13 DIAGNOSIS — B00.9 HSV-2 INFECTION: ICD-10-CM

## 2023-07-13 DIAGNOSIS — B20 HIV DISEASE: Chronic | ICD-10-CM

## 2023-07-13 DIAGNOSIS — F41.9 ANXIETY DISORDER, UNSPECIFIED TYPE: ICD-10-CM

## 2023-07-13 DIAGNOSIS — T68.XXXA HYPOTHERMIA, INITIAL ENCOUNTER: ICD-10-CM

## 2023-07-13 DIAGNOSIS — R07.9 CHEST PAIN: ICD-10-CM

## 2023-07-13 DIAGNOSIS — F12.10 MARIJUANA ABUSE: ICD-10-CM

## 2023-07-13 DIAGNOSIS — Z00.8 MEDICAL CLEARANCE FOR PSYCHIATRIC ADMISSION: ICD-10-CM

## 2023-07-13 DIAGNOSIS — F15.10 AMPHETAMINE ABUSE: ICD-10-CM

## 2023-07-13 DIAGNOSIS — R09.02 HYPOXIA: ICD-10-CM

## 2023-07-13 DIAGNOSIS — I95.9 HYPOTENSION: ICD-10-CM

## 2023-07-13 DIAGNOSIS — R65.20 SEVERE SEPSIS: ICD-10-CM

## 2023-07-13 DIAGNOSIS — F14.10 COCAINE ABUSE: ICD-10-CM

## 2023-07-13 DIAGNOSIS — A41.9 SEVERE SEPSIS: ICD-10-CM

## 2023-07-13 DIAGNOSIS — F23 ACUTE PSYCHOSIS: Primary | ICD-10-CM

## 2023-07-13 DIAGNOSIS — A53.0 POSITIVE SEROLOGICAL REACTION FOR SYPHILIS: ICD-10-CM

## 2023-07-13 LAB
ALBUMIN SERPL BCP-MCNC: 4.7 G/DL (ref 3.5–5.2)
ALP SERPL-CCNC: 86 U/L (ref 55–135)
ALT SERPL W/O P-5'-P-CCNC: 26 U/L (ref 10–44)
AMPHET+METHAMPHET UR QL: ABNORMAL
ANION GAP SERPL CALC-SCNC: 12 MMOL/L (ref 8–16)
APAP SERPL-MCNC: <3 UG/ML (ref 10–20)
AST SERPL-CCNC: 22 U/L (ref 10–40)
BARBITURATES UR QL SCN>200 NG/ML: NEGATIVE
BASOPHILS # BLD AUTO: 0.04 K/UL (ref 0–0.2)
BASOPHILS NFR BLD: 0.5 % (ref 0–1.9)
BENZODIAZ UR QL SCN>200 NG/ML: ABNORMAL
BILIRUB SERPL-MCNC: 0.8 MG/DL (ref 0.1–1)
BILIRUB UR QL STRIP: NEGATIVE
BUN SERPL-MCNC: 12 MG/DL (ref 6–20)
BZE UR QL SCN: NEGATIVE
CALCIUM SERPL-MCNC: 10.3 MG/DL (ref 8.7–10.5)
CANNABINOIDS UR QL SCN: NEGATIVE
CHLORIDE SERPL-SCNC: 107 MMOL/L (ref 95–110)
CLARITY UR REFRACT.AUTO: CLEAR
CO2 SERPL-SCNC: 20 MMOL/L (ref 23–29)
COLOR UR AUTO: YELLOW
CREAT SERPL-MCNC: 1.2 MG/DL (ref 0.5–1.4)
CREAT UR-MCNC: 68 MG/DL (ref 23–375)
DIFFERENTIAL METHOD: NORMAL
EOSINOPHIL # BLD AUTO: 0.1 K/UL (ref 0–0.5)
EOSINOPHIL NFR BLD: 1.4 % (ref 0–8)
ERYTHROCYTE [DISTWIDTH] IN BLOOD BY AUTOMATED COUNT: 13.4 % (ref 11.5–14.5)
EST. GFR  (NO RACE VARIABLE): >60 ML/MIN/1.73 M^2
ETHANOL SERPL-MCNC: <10 MG/DL
GLUCOSE SERPL-MCNC: 71 MG/DL (ref 70–110)
GLUCOSE UR QL STRIP: NEGATIVE
HCT VFR BLD AUTO: 44.5 % (ref 40–54)
HGB BLD-MCNC: 15.1 G/DL (ref 14–18)
HGB UR QL STRIP: NEGATIVE
IMM GRANULOCYTES # BLD AUTO: 0.02 K/UL (ref 0–0.04)
IMM GRANULOCYTES NFR BLD AUTO: 0.2 % (ref 0–0.5)
KETONES UR QL STRIP: NEGATIVE
LEUKOCYTE ESTERASE UR QL STRIP: NEGATIVE
LYMPHOCYTES # BLD AUTO: 2.5 K/UL (ref 1–4.8)
LYMPHOCYTES NFR BLD: 29 % (ref 18–48)
MCH RBC QN AUTO: 29.7 PG (ref 27–31)
MCHC RBC AUTO-ENTMCNC: 33.9 G/DL (ref 32–36)
MCV RBC AUTO: 87 FL (ref 82–98)
METHADONE UR QL SCN>300 NG/ML: NEGATIVE
MONOCYTES # BLD AUTO: 0.8 K/UL (ref 0.3–1)
MONOCYTES NFR BLD: 8.7 % (ref 4–15)
NEUTROPHILS # BLD AUTO: 5.2 K/UL (ref 1.8–7.7)
NEUTROPHILS NFR BLD: 60.2 % (ref 38–73)
NITRITE UR QL STRIP: NEGATIVE
NRBC BLD-RTO: 0 /100 WBC
OPIATES UR QL SCN: NEGATIVE
PCP UR QL SCN>25 NG/ML: NEGATIVE
PH UR STRIP: 7 [PH] (ref 5–8)
PLATELET # BLD AUTO: 321 K/UL (ref 150–450)
PMV BLD AUTO: 9.8 FL (ref 9.2–12.9)
POTASSIUM SERPL-SCNC: 3.9 MMOL/L (ref 3.5–5.1)
PROT SERPL-MCNC: 8.3 G/DL (ref 6–8.4)
PROT UR QL STRIP: NEGATIVE
RBC # BLD AUTO: 5.09 M/UL (ref 4.6–6.2)
SODIUM SERPL-SCNC: 139 MMOL/L (ref 136–145)
SP GR UR STRIP: 1.01 (ref 1–1.03)
TOXICOLOGY INFORMATION: ABNORMAL
TSH SERPL DL<=0.005 MIU/L-ACNC: 3.22 UIU/ML (ref 0.4–4)
URN SPEC COLLECT METH UR: NORMAL
WBC # BLD AUTO: 8.59 K/UL (ref 3.9–12.7)

## 2023-07-13 PROCEDURE — 96375 TX/PRO/DX INJ NEW DRUG ADDON: CPT

## 2023-07-13 PROCEDURE — 63600175 PHARM REV CODE 636 W HCPCS: Performed by: EMERGENCY MEDICINE

## 2023-07-13 PROCEDURE — 96367 TX/PROPH/DG ADDL SEQ IV INF: CPT

## 2023-07-13 PROCEDURE — 99285 EMERGENCY DEPT VISIT HI MDM: CPT

## 2023-07-13 PROCEDURE — 85025 COMPLETE CBC W/AUTO DIFF WBC: CPT | Performed by: EMERGENCY MEDICINE

## 2023-07-13 PROCEDURE — 80053 COMPREHEN METABOLIC PANEL: CPT | Performed by: EMERGENCY MEDICINE

## 2023-07-13 PROCEDURE — 25000003 PHARM REV CODE 250: Performed by: EMERGENCY MEDICINE

## 2023-07-13 PROCEDURE — 80143 DRUG ASSAY ACETAMINOPHEN: CPT | Performed by: EMERGENCY MEDICINE

## 2023-07-13 PROCEDURE — 96374 THER/PROPH/DIAG INJ IV PUSH: CPT | Mod: 59

## 2023-07-13 PROCEDURE — 81003 URINALYSIS AUTO W/O SCOPE: CPT | Performed by: EMERGENCY MEDICINE

## 2023-07-13 PROCEDURE — 80307 DRUG TEST PRSMV CHEM ANLYZR: CPT | Performed by: EMERGENCY MEDICINE

## 2023-07-13 PROCEDURE — 12000002 HC ACUTE/MED SURGE SEMI-PRIVATE ROOM

## 2023-07-13 PROCEDURE — 96372 THER/PROPH/DIAG INJ SC/IM: CPT | Performed by: EMERGENCY MEDICINE

## 2023-07-13 PROCEDURE — 96365 THER/PROPH/DIAG IV INF INIT: CPT | Mod: 59

## 2023-07-13 PROCEDURE — 82077 ASSAY SPEC XCP UR&BREATH IA: CPT | Performed by: EMERGENCY MEDICINE

## 2023-07-13 PROCEDURE — 84443 ASSAY THYROID STIM HORMONE: CPT | Performed by: EMERGENCY MEDICINE

## 2023-07-13 PROCEDURE — 96368 THER/DIAG CONCURRENT INF: CPT

## 2023-07-13 RX ORDER — LORAZEPAM 2 MG/ML
2 INJECTION INTRAMUSCULAR
Status: COMPLETED | OUTPATIENT
Start: 2023-07-13 | End: 2023-07-13

## 2023-07-13 RX ORDER — HALOPERIDOL 5 MG/ML
5 INJECTION INTRAMUSCULAR
Status: COMPLETED | OUTPATIENT
Start: 2023-07-13 | End: 2023-07-13

## 2023-07-13 RX ADMIN — HALOPERIDOL LACTATE 5 MG: 5 INJECTION, SOLUTION INTRAMUSCULAR at 07:07

## 2023-07-13 RX ADMIN — LORAZEPAM 2 MG: 2 INJECTION INTRAMUSCULAR; INTRAVENOUS at 07:07

## 2023-07-13 RX ADMIN — SODIUM CHLORIDE 1000 ML: 9 INJECTION, SOLUTION INTRAVENOUS at 07:07

## 2023-07-13 NOTE — LETTER
July 19, 2023         [address]  [facility] [address]  Phone: [phone]  Fax: [phone]       Patient: Flako Pearce   YOB: 1990  Date of Visit: 07/19/2023    To Whom It May Concern:    Shin Pearce  was at [facility] from 7/13/23 - 07/19/2023. The patient may return to work on 7/20/23 with no restrictions. If you have any questions or concerns, or if I can be of further assistance, please do not hesitate to contact me.    Sincerely,    Lisset Sandra MD

## 2023-07-13 NOTE — ED PROVIDER NOTES
Encounter Date: 7/13/2023       History     Chief Complaint   Patient presents with    Paranoid     33-year-old male with a history bipolar disorder, anxiety disorder, presenting with paranoia, flight of ideas, disorganized thinking.  There is nobody accompanying the patient so it is unclear how he got here today.  Patient does admit to polysubstance abuse    The history is provided by the patient. The history is limited by the absence of a caregiver and the condition of the patient.     Review of patient's allergies indicates:  No Known Allergies  Past Medical History:   Diagnosis Date    Bipolar 1 disorder     PHU (generalized anxiety disorder)     Panic disorder      No past surgical history on file.  No family history on file.  Social History     Tobacco Use    Smoking status: Some Days     Current packs/day: 0.00     Types: Cigarettes    Smokeless tobacco: Never   Substance Use Topics    Alcohol use: Yes     Comment: occasionally     Drug use: No     Review of Systems    Physical Exam     Initial Vitals [07/13/23 1757]   BP Pulse Resp Temp SpO2   110/73 (!) 111 20 98.6 °F (37 °C) 99 %      MAP       --         Physical Exam    Nursing note and vitals reviewed.  Constitutional: Vital signs are normal. He appears well-developed and well-nourished. He is not diaphoretic.  Non-toxic appearance. He does not appear ill. He appears distressed.   HENT:   Head: Normocephalic and atraumatic.   Mouth/Throat: Mucous membranes are normal. Mucous membranes are not dry.   Eyes: Conjunctivae and lids are normal.   Neck: Neck supple.   Normal range of motion.  Cardiovascular:  Normal rate.           Musculoskeletal:      Cervical back: Normal range of motion and neck supple.     Neurological: He is alert and oriented to person, place, and time. He has normal strength.   Skin: Skin is dry and intact. No pallor.   Psychiatric: His mood appears anxious. His affect is labile and inappropriate. His speech is rapid and/or pressured  and tangential. He is agitated, hyperactive and actively hallucinating. He is not aggressive. Thought content is paranoid. He expresses no homicidal and no suicidal ideation.         ED Course   Procedures  Labs Reviewed   COMPREHENSIVE METABOLIC PANEL - Abnormal; Notable for the following components:       Result Value    CO2 20 (*)     All other components within normal limits   DRUG SCREEN PANEL, URINE EMERGENCY - Abnormal; Notable for the following components:    Benzodiazepines Presumptive Positive (*)     Amphetamine Screen, Ur Presumptive Positive (*)     All other components within normal limits    Narrative:     Specimen Source->Urine   ACETAMINOPHEN LEVEL - Abnormal; Notable for the following components:    Acetaminophen (Tylenol), Serum <3.0 (*)     All other components within normal limits   CSF CELL COUNT WITH DIFFERENTIAL - Abnormal; Notable for the following components:    RBC, CSF 2 (*)     Segmented Neutrophils, CSF 8 (*)     Lymphs, CSF 88 (*)     Mono/Macrophage, CSF 4 (*)     All other components within normal limits   COMPREHENSIVE METABOLIC PANEL - Abnormal; Notable for the following components:    Potassium 3.4 (*)     Chloride 114 (*)     CO2 18 (*)     Calcium 7.5 (*)     Total Protein 5.5 (*)     Albumin 3.1 (*)     Anion Gap 5 (*)     All other components within normal limits   CBC W/ AUTO DIFFERENTIAL - Abnormal; Notable for the following components:    RBC 3.99 (*)     Hemoglobin 11.9 (*)     Hematocrit 36.8 (*)     All other components within normal limits   HIV 1 / 2 ANTIBODY - Abnormal; Notable for the following components:    HIV 1/2 Ag/Ab Reactive (*)     All other components within normal limits    Narrative:     Release to patient->Immediate   RPR - Abnormal; Notable for the following components:    RPR Reactive (*)     All other components within normal limits    Narrative:     Release to patient->Immediate   HIV 1/2 SUPPLEMENTAL ASSAY - Abnormal; Notable for the following  components:    HIV-1 Result Positive (*)     HIV Supplemental Assay Interpretation HIV-1 Positive (*)     All other components within normal limits    Narrative:     Release to patient->Immediate   RPR, QUANTITATIVE - Abnormal; Notable for the following components:    RPR Quantitative 1:4 (*)     All other components within normal limits    Narrative:     Release to patient->Immediate   ISTAT PROCEDURE - Abnormal; Notable for the following components:    POC PH 7.299 (*)     POC PCO2 47.4 (*)     POC PO2 38 (*)     POC HCO3 23.3 (*)     POC SATURATED O2 66 (*)     All other components within normal limits   ISTAT PROCEDURE - Abnormal; Notable for the following components:    POC PH 7.339 (*)     POC HCO3 20.6 (*)     POC TCO2 22 (*)     All other components within normal limits   ISTAT PROCEDURE - Abnormal; Notable for the following components:    POC Potassium 8.4 (*)     POC Ionized Calcium 1.04 (*)     All other components within normal limits   CRYPTOCOCCAL ANTIGEN, CSF    Narrative:     On which sequentially labeled tube should this analysis be  performed?->3   CBC W/ AUTO DIFFERENTIAL   TSH   URINALYSIS, REFLEX TO URINE CULTURE    Narrative:     Specimen Source->Urine   ALCOHOL,MEDICAL (ETHANOL)   LACTIC ACID, PLASMA   PROCALCITONIN   AMMONIA   GLUCOSE, CSF   PROTEIN, CSF   B-TYPE NATRIURETIC PEPTIDE   TROPONIN I   LACTIC ACID, PLASMA   LIPASE   HEPATITIS PANEL, ACUTE   LIPASE    Narrative:     add on lipase per Arleth Cruz NP order# [id number] 07/14/2023 @   07:24    T-HELPER CELLS (CD4) COUNT   HIV RNA, QUANTITATIVE, PCR    Narrative:     Release to patient->Immediate   VDRL, CSF   T3    Narrative:     Release to patient->Immediate   T4, FREE    Narrative:     Release to patient->Immediate   FREEZE AND HOLD -    ISTAT PROCEDURE   POCT GLUCOSE        ECG Results              EKG 12-lead (Final result)  Result time 07/14/23 08:44:48      Final result by Interface, Lab In [facility] (07/14/23 08:44:48)                    Narrative:    Test Reason : I95.9,    Vent. Rate : 050 BPM     Atrial Rate : 050 BPM     P-R Int : 164 ms          QRS Dur : 098 ms      QT Int : 462 ms       P-R-T Axes : 036 088 010 degrees     QTc Int : 421 ms    Sinus bradycardia with sinus arrhythmia  Otherwise normal ECG  When compared with ECG of 21-OCT-2018 13:39,  Vent. rate has decreased BY  29 BPM  Confirmed by Gaurang Diaz MD ([id number]) on 7/14/2023 8:44:39 AM    Referred By: [email or web address]   SELF           Confirmed By:Gaurang Diaz MD                                  Imaging Results              CT Chest Abdomen Pelvis With Contrast (xpd) (Final result)  Result time 07/14/23 10:59:18   Procedure changed from CT Abdomen Pelvis With Contrast     Final result by Gigi Underwood MD (07/14/23 10:59:18)                   Impression:      Subcentimeter left lobe hepatic cyst.    Mild hepatomegaly.    Trace ascites.      Electronically signed by: Gigi Underwood MD  Date:    07/14/2023  Time:    10:59               Narrative:    EXAMINATION:  CT CHEST ABDOMEN PELVIS WITH CONTRAST (XPD)    CLINICAL HISTORY:  Sepsis;    TECHNIQUE:  Low dose axial images, sagittal and coronal reformations were obtained from the thoracic inlet to the pubic symphysis following the IV administration of 100 mL of Omnipaque 350.  No oral contrast was administered.    COMPARISON:  None    FINDINGS:  Soft tissue structures at the base of the neck appear unremarkable.  The heart is not enlarged.  The thoracic aorta is normal in caliber without evidence for aneurysmal dilatation.  No hilar or mediastinal adenopathy is identified.  No axillary adenopathy is identified.  There is some respiratory motion.  There are mild dependent atelectatic changes within the lung bases.  No lung suspicious lung nodules are evident.  There is no evidence for pneumothorax or pleural effusions.  Bony structures appear intact.  There is no evidence for acute fracture or bone destruction.    There is a  9 mm cyst within the left lobe of the liver.  The liver appears mildly enlarged.  No other focal liver lesions are identified.  The gallbladder is present and appears grossly unremarkable.  There is no evidence for intrahepatic or extrahepatic biliary dilatation.  The portal venous system is patent.  The spleen, stomach, and pancreas appear unremarkable.  The adrenal glands are not enlarged.  The kidneys are normal in size, position, and contour and are noted to concentrate contrast symmetrically.  The abdominal aorta tapers normally without aneurysmal dilatation.  No para-aortic lymphadenopathy is identified.  The appendix is not confidently identified, however there are no CT findings to suggest appendicitis.  No dilated loops of bowel are evident.  The urinary bladder appears unremarkable.  There is no evidence for pelvic or inguinal lymphadenopathy.  There is a trace amount of ascites within the pelvis and at the caudal tip of the liver.                                       CT Head With Contrast (Final result)  Result time 07/14/23 04:47:23      Final result by Js Garrido MD (07/14/23 04:47:23)                   Impression:      The ventricular system and sulci appear prominent for age, this may relate to early involutional change, clinical and historical correlation is needed.    There is no additional evidence for acute intracranial process.    Empty sella configuration.    Electronically signed by resident: Martha Hyde  Date:    07/14/2023  Time:    04:16    Electronically signed by: Js Garrido  Date:    07/14/2023  Time:    04:47               Narrative:    EXAMINATION:  CT HEAD WITH CONTRAST    CLINICAL HISTORY:  Meningitis/CNS infection suspected;Mental status change, unknown cause;    TECHNIQUE:  Low dose axial CT images obtained throughout the head before and after the IV administration of 100 mL of Omnipaque 350.  Sagittal and coronal reconstructions were  performed.    COMPARISON:  None.    FINDINGS:  Intracranial compartment:    Prominence of the ventricles and sulci out of proportion for patient's age may relate to early involutional change, clinical and historical correlation is needed no extra-axial blood or fluid collections.    The brain parenchyma otherwise appears within normal limits normal.  No parenchymal mass, hemorrhage, edema or major vascular distribution infarct.  No abnormal enhancement.    Empty sella configuration.    Skull/extracranial contents (limited evaluation):    No fracture. Mastoid air cells and paranasal sinuses are essentially clear.                                       X-Ray Chest 1 View (Final result)  Result time 07/14/23 04:28:59      Final result by Js Garrido MD (07/14/23 04:28:59)                   Impression:      Mild diminished depth of inspiration and mild atelectatic change without evidence for superimposed acute intrathoracic process.      Electronically signed by: Js Garrido  Date:    07/14/2023  Time:    04:28               Narrative:    EXAMINATION:  XR CHEST 1 VIEW    CLINICAL HISTORY:  Hypoxemia    TECHNIQUE:  Single frontal view of the chest was performed.    COMPARISON:  None    FINDINGS:  Single chest view is submitted.  There is diminished depth of inspiration and mild rotation, when accounting for these factors the cardiomediastinal silhouette appears appropriate.    Mild accentuation of pulmonary bronchovascular markings consistent with diminished depth of inspiration noted.  Mild atelectatic change noted.  There is no evidence for superimposed confluent infiltrate or consolidation, significant pleural effusion or pneumothorax.    The osseous structures appear intact.                                       Medications   haloperidol lactate injection 5 mg (5 mg Intramuscular Given 7/13/23 1910)   LORazepam injection 2 mg (2 mg Intramuscular Given 7/13/23 1910)   sodium chloride 0.9% bolus 1,000 mL  1,000 mL (0 mLs Intravenous Stopped 7/13/23 2025)   sodium chloride 0.9% bolus 1,000 mL 1,000 mL (0 mLs Intravenous Stopped 7/14/23 0530)   naloxone 0.4 mg/mL injection 2 mg (2 mg Intravenous Given 7/14/23 0200)   naloxone (NARCAN) 1 mg/mL injection (2 mg Intravenous Given 7/14/23 0218)   sodium chloride 0.9% bolus 1,000 mL 1,000 mL (0 mLs Intravenous Stopped 7/14/23 0530)   ceFEPIme (MAXIPIME) 1 g in dextrose 5 % in water (D5W) 5 % 100 mL IVPB (MB+) (0 g Intravenous Stopped 7/14/23 0422)   acyclovir (ZOVIRAX) 800 mg in dextrose 5 % (D5W) 250 mL IVPB (0 mg Intravenous Stopped 7/14/23 0625)   vancomycin 1.75 g in 5 % dextrose 500 mL IVPB (0 mg Intravenous Stopped 7/14/23 0646)   iohexoL (OMNIPAQUE 350) injection 100 mL (100 mLs Intravenous Given 7/14/23 0341)   sodium chloride 0.9% bolus 500 mL 500 mL (0 mLs Intravenous Stopped 7/14/23 0920)   iohexoL (OMNIPAQUE 350) injection 100 mL (100 mLs Intravenous Given 7/14/23 1037)   vancomycin (VANCOCIN) 500 mg in dextrose 5 % in water (D5W) 5 % 100 mL IVPB (MB+) (0 mg Intravenous Stopped 7/15/23 1851)   potassium chloride CR capsule 30 mEq (30 mEq Oral Given 7/16/23 0833)   diazePAM tablet 5 mg ( Oral Canceled Entry 7/17/23 0900)     Medical Decision Making:   History:   Old Medical Records: I decided to obtain old medical records.  Old Records Summarized: records from clinic visits and records from previous admission(s).  Initial Assessment:   This patient is experiencing an acute psychiatric emergency.      The ED plan will include the following interventions:    -Labs including CBC, CMP, TSH, UA, urine drug tox screen, ethanol for medical clearance  -1:1 observation  -Medications will be needed for agitation/patient and staff safety; haldol and ativan ordered  -patient also mildly tachycardic so will give a liter of NS  -PEC placed because patient is not safe for d/c home either because of grave disability in the setting of acute psychosis  -Once medically cleared,  patient will be transferred to outside facility for psychiatric admission      Clinical Tests:   Lab Tests: Ordered and Reviewed                Medically cleared for psychiatry placement: 7/13/2023  8:27 PM         Clinical Impression:   Final diagnoses:  [F23] Acute psychosis (Primary)  [Z00.8] Medical clearance for psychiatric admission  [T68.XXXA] Hypothermia, initial encounter  [R09.02] Hypoxia  [I95.9] Hypotension        ED Disposition Condition    Admit Stable                Emeli Kline MD  07/19/23 1127       Emeli Kline MD  08/09/23 1553

## 2023-07-14 ENCOUNTER — HOSPITAL ENCOUNTER (OUTPATIENT)
Dept: NEUROLOGY | Facility: CLINIC | Age: 33
Discharge: HOME | DRG: 885 | End: 2023-07-14
Payer: MEDICAID

## 2023-07-14 DIAGNOSIS — R56.9 SEIZURE: Primary | ICD-10-CM

## 2023-07-14 PROBLEM — R65.21 SEPTIC SHOCK: Status: ACTIVE | Noted: 2023-07-14

## 2023-07-14 PROBLEM — R65.20 SEVERE SEPSIS: Status: ACTIVE | Noted: 2023-07-14

## 2023-07-14 PROBLEM — A41.9 SEPTIC SHOCK: Status: ACTIVE | Noted: 2023-07-14

## 2023-07-14 LAB
ALBUMIN SERPL BCP-MCNC: 3.1 G/DL (ref 3.5–5.2)
ALLENS TEST: ABNORMAL
ALLENS TEST: ABNORMAL
ALP SERPL-CCNC: 59 U/L (ref 55–135)
ALT SERPL W/O P-5'-P-CCNC: 15 U/L (ref 10–44)
AMMONIA PLAS-SCNC: 20 UMOL/L (ref 10–50)
ANION GAP SERPL CALC-SCNC: 5 MMOL/L (ref 8–16)
AST SERPL-CCNC: 14 U/L (ref 10–40)
BASOPHILS # BLD AUTO: 0.03 K/UL (ref 0–0.2)
BASOPHILS NFR BLD: 0.5 % (ref 0–1.9)
BILIRUB SERPL-MCNC: 0.4 MG/DL (ref 0.1–1)
BNP SERPL-MCNC: 47 PG/ML (ref 0–99)
BUN SERPL-MCNC: 10 MG/DL (ref 6–30)
BUN SERPL-MCNC: 12 MG/DL (ref 6–30)
BUN SERPL-MCNC: 8 MG/DL (ref 6–20)
CALCIUM SERPL-MCNC: 7.5 MG/DL (ref 8.7–10.5)
CD3+CD4+ CELLS # BLD: 1061 CELLS/UL (ref 300–1400)
CD3+CD4+ CELLS NFR BLD: 48.7 % (ref 28–57)
CHLORIDE SERPL-SCNC: 105 MMOL/L (ref 95–110)
CHLORIDE SERPL-SCNC: 108 MMOL/L (ref 95–110)
CHLORIDE SERPL-SCNC: 114 MMOL/L (ref 95–110)
CLARITY CSF: CLEAR
CO2 SERPL-SCNC: 18 MMOL/L (ref 23–29)
COLOR CSF: COLORLESS
CREAT SERPL-MCNC: 0.9 MG/DL (ref 0.5–1.4)
CREAT SERPL-MCNC: 1.1 MG/DL (ref 0.5–1.4)
CREAT SERPL-MCNC: 1.4 MG/DL (ref 0.5–1.4)
CRYPTOC AG CSF QL LA: NEGATIVE
CSF TUBE NUMBER: 1
CSF TUBE NUMBER: 1
DELSYS: ABNORMAL
DELSYS: ABNORMAL
DIFFERENTIAL METHOD: ABNORMAL
EOSINOPHIL # BLD AUTO: 0.1 K/UL (ref 0–0.5)
EOSINOPHIL NFR BLD: 2.4 % (ref 0–8)
ERYTHROCYTE [DISTWIDTH] IN BLOOD BY AUTOMATED COUNT: 13.7 % (ref 11.5–14.5)
EST. GFR  (NO RACE VARIABLE): >60 ML/MIN/1.73 M^2
FIO2: 21
GLUCOSE CSF-MCNC: 60 MG/DL (ref 40–70)
GLUCOSE SERPL-MCNC: 89 MG/DL (ref 70–110)
GLUCOSE SERPL-MCNC: 91 MG/DL (ref 70–110)
GLUCOSE SERPL-MCNC: 92 MG/DL (ref 70–110)
HAV IGM SERPL QL IA: NORMAL
HBV CORE IGM SERPL QL IA: NORMAL
HBV SURFACE AG SERPL QL IA: NORMAL
HCO3 UR-SCNC: 20.6 MMOL/L (ref 24–28)
HCO3 UR-SCNC: 23.3 MMOL/L (ref 24–28)
HCT VFR BLD AUTO: 36.8 % (ref 40–54)
HCT VFR BLD CALC: 38 %PCV (ref 36–54)
HCT VFR BLD CALC: 45 %PCV (ref 36–54)
HCV AB SERPL QL IA: NORMAL
HGB BLD-MCNC: 11.9 G/DL (ref 14–18)
HIV 1+2 AB+HIV1 P24 AG SERPL QL IA: REACTIVE
HIV SUPPLEMENTAL ASSAY INTERPRETATION: ABNORMAL
HIV-1 RESULT: POSITIVE
HIV-2 RESULT: NEGATIVE
HIV1 RNA # SERPL NAA+PROBE: NOT DETECTED COPIES/ML
HIV1 RNA SERPL QL NAA+PROBE: NOT DETECTED
IMM GRANULOCYTES # BLD AUTO: 0.01 K/UL (ref 0–0.04)
IMM GRANULOCYTES NFR BLD AUTO: 0.2 % (ref 0–0.5)
LACTATE SERPL-SCNC: 0.6 MMOL/L (ref 0.5–2.2)
LACTATE SERPL-SCNC: 0.8 MMOL/L (ref 0.5–2.2)
LIPASE SERPL-CCNC: 31 U/L (ref 4–60)
LYMPHOCYTES # BLD AUTO: 2.2 K/UL (ref 1–4.8)
LYMPHOCYTES NFR BLD: 40 % (ref 18–48)
LYMPHOCYTES NFR CSF MANUAL: 88 % (ref 40–80)
MCH RBC QN AUTO: 29.8 PG (ref 27–31)
MCHC RBC AUTO-ENTMCNC: 32.3 G/DL (ref 32–36)
MCV RBC AUTO: 92 FL (ref 82–98)
MODE: ABNORMAL
MONOCYTES # BLD AUTO: 0.6 K/UL (ref 0.3–1)
MONOCYTES NFR BLD: 11.6 % (ref 4–15)
MONOS+MACROS NFR CSF MANUAL: 4 % (ref 15–45)
NEUTROPHILS # BLD AUTO: 2.5 K/UL (ref 1.8–7.7)
NEUTROPHILS NFR BLD: 45.3 % (ref 38–73)
NEUTROPHILS NFR CSF MANUAL: 8 % (ref 0–6)
NRBC BLD-RTO: 0 /100 WBC
PCO2 BLDA: 38.3 MMHG (ref 35–45)
PCO2 BLDA: 47.4 MMHG (ref 35–45)
PH SMN: 7.3 [PH] (ref 7.35–7.45)
PH SMN: 7.34 [PH] (ref 7.35–7.45)
PLATELET # BLD AUTO: 206 K/UL (ref 150–450)
PMV BLD AUTO: 9.8 FL (ref 9.2–12.9)
PO2 BLDA: 100 MMHG (ref 80–100)
PO2 BLDA: 38 MMHG (ref 40–60)
POC BE: -3 MMOL/L
POC BE: -5 MMOL/L
POC IONIZED CALCIUM: 1.04 MMOL/L (ref 1.06–1.42)
POC IONIZED CALCIUM: 1.09 MMOL/L (ref 1.06–1.42)
POC SATURATED O2: 66 % (ref 95–100)
POC SATURATED O2: 97 % (ref 95–100)
POC TCO2 (MEASURED): 23 MMOL/L (ref 23–29)
POC TCO2 (MEASURED): 24 MMOL/L (ref 23–29)
POC TCO2: 22 MMOL/L (ref 23–27)
POC TCO2: 25 MMOL/L (ref 24–29)
POCT GLUCOSE: 88 MG/DL (ref 70–110)
POTASSIUM BLD-SCNC: 3.9 MMOL/L (ref 3.5–5.1)
POTASSIUM BLD-SCNC: 8.4 MMOL/L (ref 3.5–5.1)
POTASSIUM SERPL-SCNC: 3.4 MMOL/L (ref 3.5–5.1)
PROCALCITONIN SERPL IA-MCNC: <0.02 NG/ML
PROT CSF-MCNC: 36 MG/DL (ref 15–40)
PROT SERPL-MCNC: 5.5 G/DL (ref 6–8.4)
RBC # BLD AUTO: 3.99 M/UL (ref 4.6–6.2)
RBC # CSF: 2 /CU MM
RPR SER QL: REACTIVE
RPR SER-TITR: ABNORMAL {TITER}
SAMPLE: ABNORMAL
SAMPLE: NORMAL
SITE: ABNORMAL
SITE: ABNORMAL
SODIUM BLD-SCNC: 137 MMOL/L (ref 136–145)
SODIUM BLD-SCNC: 142 MMOL/L (ref 136–145)
SODIUM SERPL-SCNC: 137 MMOL/L (ref 136–145)
SPECIMEN VOL CSF: 1.5 ML
T3 SERPL-MCNC: 81 NG/DL (ref 60–180)
T4 FREE SERPL-MCNC: 0.98 NG/DL (ref 0.71–1.51)
TROPONIN I SERPL DL<=0.01 NG/ML-MCNC: <0.006 NG/ML (ref 0–0.03)
WBC # BLD AUTO: 5.5 K/UL (ref 3.9–12.7)
WBC # CSF: 3 /CU MM (ref 0–5)

## 2023-07-14 PROCEDURE — 63600175 PHARM REV CODE 636 W HCPCS: Performed by: HOSPITALIST

## 2023-07-14 PROCEDURE — 86780 TREPONEMA PALLIDUM: CPT

## 2023-07-14 PROCEDURE — 87529 HSV DNA AMP PROBE: CPT

## 2023-07-14 PROCEDURE — 99223 1ST HOSP IP/OBS HIGH 75: CPT | Mod: ,,, | Performed by: INTERNAL MEDICINE

## 2023-07-14 PROCEDURE — 87070 CULTURE OTHR SPECIMN AEROBIC: CPT

## 2023-07-14 PROCEDURE — 84145 PROCALCITONIN (PCT): CPT | Performed by: EMERGENCY MEDICINE

## 2023-07-14 PROCEDURE — 87556 M.TUBERCULO DNA AMP PROBE: CPT

## 2023-07-14 PROCEDURE — 83690 ASSAY OF LIPASE: CPT | Performed by: EMERGENCY MEDICINE

## 2023-07-14 PROCEDURE — 25500020 PHARM REV CODE 255: Performed by: HOSPITALIST

## 2023-07-14 PROCEDURE — 25500020 PHARM REV CODE 255: Performed by: EMERGENCY MEDICINE

## 2023-07-14 PROCEDURE — 99900035 HC TECH TIME PER 15 MIN (STAT)

## 2023-07-14 PROCEDURE — 99223 PR INITIAL HOSPITAL CARE,LEVL III: ICD-10-PCS | Mod: AF,HB,, | Performed by: STUDENT IN AN ORGANIZED HEALTH CARE EDUCATION/TRAINING PROGRAM

## 2023-07-14 PROCEDURE — 84484 ASSAY OF TROPONIN QUANT: CPT | Performed by: EMERGENCY MEDICINE

## 2023-07-14 PROCEDURE — 95816 EEG AWAKE AND DROWSY: CPT | Mod: 26,,, | Performed by: PSYCHIATRY & NEUROLOGY

## 2023-07-14 PROCEDURE — 82803 BLOOD GASES ANY COMBINATION: CPT

## 2023-07-14 PROCEDURE — 86403 PARTICLE AGGLUT ANTBDY SCRN: CPT

## 2023-07-14 PROCEDURE — 89051 BODY FLUID CELL COUNT: CPT

## 2023-07-14 PROCEDURE — 87205 SMEAR GRAM STAIN: CPT

## 2023-07-14 PROCEDURE — 87498 ENTEROVIRUS PROBE&REVRS TRNS: CPT

## 2023-07-14 PROCEDURE — 63600175 PHARM REV CODE 636 W HCPCS: Performed by: EMERGENCY MEDICINE

## 2023-07-14 PROCEDURE — 63600175 PHARM REV CODE 636 W HCPCS

## 2023-07-14 PROCEDURE — 87389 HIV-1 AG W/HIV-1&-2 AB AG IA: CPT | Mod: 91

## 2023-07-14 PROCEDURE — 87798 DETECT AGENT NOS DNA AMP: CPT

## 2023-07-14 PROCEDURE — 25000003 PHARM REV CODE 250: Performed by: HOSPITALIST

## 2023-07-14 PROCEDURE — 93010 ELECTROCARDIOGRAM REPORT: CPT | Mod: ,,, | Performed by: INTERNAL MEDICINE

## 2023-07-14 PROCEDURE — 84480 ASSAY TRIIODOTHYRONINE (T3): CPT

## 2023-07-14 PROCEDURE — 86361 T CELL ABSOLUTE COUNT: CPT

## 2023-07-14 PROCEDURE — 99223 PR INITIAL HOSPITAL CARE,LEVL III: ICD-10-PCS | Mod: ,,, | Performed by: INTERNAL MEDICINE

## 2023-07-14 PROCEDURE — 85025 COMPLETE CBC W/AUTO DIFF WBC: CPT | Performed by: EMERGENCY MEDICINE

## 2023-07-14 PROCEDURE — 82140 ASSAY OF AMMONIA: CPT | Performed by: EMERGENCY MEDICINE

## 2023-07-14 PROCEDURE — 87496 CYTOMEG DNA AMP PROBE: CPT

## 2023-07-14 PROCEDURE — 80074 ACUTE HEPATITIS PANEL: CPT | Performed by: NURSE PRACTITIONER

## 2023-07-14 PROCEDURE — 83605 ASSAY OF LACTIC ACID: CPT | Performed by: EMERGENCY MEDICINE

## 2023-07-14 PROCEDURE — 25000003 PHARM REV CODE 250

## 2023-07-14 PROCEDURE — 25000003 PHARM REV CODE 250: Performed by: EMERGENCY MEDICINE

## 2023-07-14 PROCEDURE — 83605 ASSAY OF LACTIC ACID: CPT | Mod: 91

## 2023-07-14 PROCEDURE — 86593 SYPHILIS TEST NON-TREP QUANT: CPT

## 2023-07-14 PROCEDURE — 20600001 HC STEP DOWN PRIVATE ROOM

## 2023-07-14 PROCEDURE — 87040 BLOOD CULTURE FOR BACTERIA: CPT | Mod: 59 | Performed by: EMERGENCY MEDICINE

## 2023-07-14 PROCEDURE — 82945 GLUCOSE OTHER FLUID: CPT

## 2023-07-14 PROCEDURE — 93010 EKG 12-LEAD: ICD-10-PCS | Mod: ,,, | Performed by: INTERNAL MEDICINE

## 2023-07-14 PROCEDURE — 86592 SYPHILIS TEST NON-TREP QUAL: CPT | Mod: 91

## 2023-07-14 PROCEDURE — 87102 FUNGUS ISOLATION CULTURE: CPT

## 2023-07-14 PROCEDURE — 87798 DETECT AGENT NOS DNA AMP: CPT | Mod: 59

## 2023-07-14 PROCEDURE — 87116 MYCOBACTERIA CULTURE: CPT

## 2023-07-14 PROCEDURE — 84157 ASSAY OF PROTEIN OTHER: CPT

## 2023-07-14 PROCEDURE — 93005 ELECTROCARDIOGRAM TRACING: CPT

## 2023-07-14 PROCEDURE — 87536 HIV-1 QUANT&REVRSE TRNSCRPJ: CPT

## 2023-07-14 PROCEDURE — 99223 1ST HOSP IP/OBS HIGH 75: CPT | Mod: AF,HB,, | Performed by: STUDENT IN AN ORGANIZED HEALTH CARE EDUCATION/TRAINING PROGRAM

## 2023-07-14 PROCEDURE — 95816 PR EEG,W/AWAKE & DROWSY RECORD: ICD-10-PCS | Mod: 26,,, | Performed by: PSYCHIATRY & NEUROLOGY

## 2023-07-14 PROCEDURE — 25000003 PHARM REV CODE 250: Performed by: INTERNAL MEDICINE

## 2023-07-14 PROCEDURE — 86592 SYPHILIS TEST NON-TREP QUAL: CPT

## 2023-07-14 PROCEDURE — 84439 ASSAY OF FREE THYROXINE: CPT

## 2023-07-14 PROCEDURE — 95819 EEG AWAKE AND ASLEEP: CPT | Mod: PBBFAC

## 2023-07-14 PROCEDURE — 82800 BLOOD PH: CPT

## 2023-07-14 PROCEDURE — 83880 ASSAY OF NATRIURETIC PEPTIDE: CPT | Performed by: EMERGENCY MEDICINE

## 2023-07-14 PROCEDURE — 36600 WITHDRAWAL OF ARTERIAL BLOOD: CPT

## 2023-07-14 PROCEDURE — 87206 SMEAR FLUORESCENT/ACID STAI: CPT

## 2023-07-14 PROCEDURE — 80053 COMPREHEN METABOLIC PANEL: CPT | Performed by: EMERGENCY MEDICINE

## 2023-07-14 PROCEDURE — 87389 HIV-1 AG W/HIV-1&-2 AB AG IA: CPT

## 2023-07-14 RX ORDER — HALOPERIDOL DECANOATE 100 MG/ML
50 INJECTION INTRAMUSCULAR
Status: ON HOLD | COMMUNITY
Start: 2023-05-31 | End: 2023-07-19 | Stop reason: HOSPADM

## 2023-07-14 RX ORDER — DEXTROSE 40 %
24 GEL (GRAM) ORAL
Status: DISCONTINUED | OUTPATIENT
Start: 2023-07-14 | End: 2023-07-19 | Stop reason: HOSPADM

## 2023-07-14 RX ORDER — ACETAMINOPHEN 325 MG/1
650 TABLET ORAL EVERY 8 HOURS PRN
Status: DISCONTINUED | OUTPATIENT
Start: 2023-07-14 | End: 2023-07-19 | Stop reason: HOSPADM

## 2023-07-14 RX ORDER — GLUCAGON 1 MG
1 KIT INJECTION
Status: DISCONTINUED | OUTPATIENT
Start: 2023-07-14 | End: 2023-07-19 | Stop reason: HOSPADM

## 2023-07-14 RX ORDER — SODIUM CHLORIDE 0.9 % (FLUSH) 0.9 %
10 SYRINGE (ML) INJECTION
Status: DISCONTINUED | OUTPATIENT
Start: 2023-07-14 | End: 2023-07-19 | Stop reason: HOSPADM

## 2023-07-14 RX ORDER — DIVALPROEX SODIUM 500 MG/1
500 TABLET, FILM COATED, EXTENDED RELEASE ORAL DAILY
Status: ON HOLD | COMMUNITY
Start: 2023-04-20 | End: 2023-07-19 | Stop reason: HOSPADM

## 2023-07-14 RX ORDER — METRONIDAZOLE 500 MG/100ML
500 INJECTION, SOLUTION INTRAVENOUS
Status: DISCONTINUED | OUTPATIENT
Start: 2023-07-14 | End: 2023-07-16

## 2023-07-14 RX ORDER — LORAZEPAM 2 MG/ML
2 INJECTION INTRAMUSCULAR EVERY 4 HOURS PRN
Status: DISCONTINUED | OUTPATIENT
Start: 2023-07-14 | End: 2023-07-19 | Stop reason: HOSPADM

## 2023-07-14 RX ORDER — NALOXONE HCL 0.4 MG/ML
2 VIAL (ML) INJECTION
Status: COMPLETED | OUTPATIENT
Start: 2023-07-14 | End: 2023-07-14

## 2023-07-14 RX ORDER — IBUPROFEN 200 MG
24 TABLET ORAL
Status: DISCONTINUED | OUTPATIENT
Start: 2023-07-14 | End: 2023-07-14

## 2023-07-14 RX ORDER — NALOXONE HYDROCHLORIDE 1 MG/ML
INJECTION INTRAMUSCULAR; INTRAVENOUS; SUBCUTANEOUS
Status: COMPLETED
Start: 2023-07-14 | End: 2023-07-14

## 2023-07-14 RX ORDER — DEXTROSE 40 %
16 GEL (GRAM) ORAL
Status: DISCONTINUED | OUTPATIENT
Start: 2023-07-14 | End: 2023-07-19 | Stop reason: HOSPADM

## 2023-07-14 RX ORDER — NOREPINEPHRINE BITARTRATE/D5W 4MG/250ML
0-.2 PLASTIC BAG, INJECTION (ML) INTRAVENOUS CONTINUOUS
Status: DISCONTINUED | OUTPATIENT
Start: 2023-07-14 | End: 2023-07-14

## 2023-07-14 RX ORDER — DARUNAVIR, COBICISTAT, EMTRICITABINE, AND TENOFOVIR ALAFENAMIDE 800; 150; 200; 10 MG/1; MG/1; MG/1; MG/1
1 TABLET, FILM COATED ORAL DAILY
COMMUNITY
Start: 2023-07-05 | End: 2023-12-09

## 2023-07-14 RX ORDER — ONDANSETRON 2 MG/ML
4 INJECTION INTRAMUSCULAR; INTRAVENOUS EVERY 8 HOURS PRN
Status: DISCONTINUED | OUTPATIENT
Start: 2023-07-14 | End: 2023-07-19 | Stop reason: HOSPADM

## 2023-07-14 RX ORDER — IBUPROFEN 200 MG
16 TABLET ORAL
Status: DISCONTINUED | OUTPATIENT
Start: 2023-07-14 | End: 2023-07-14

## 2023-07-14 RX ORDER — HALOPERIDOL 5 MG/ML
5 INJECTION INTRAMUSCULAR EVERY 6 HOURS PRN
Status: DISCONTINUED | OUTPATIENT
Start: 2023-07-14 | End: 2023-07-19 | Stop reason: HOSPADM

## 2023-07-14 RX ORDER — PROPRANOLOL HYDROCHLORIDE 80 MG/1
80 TABLET ORAL 2 TIMES DAILY
Status: ON HOLD | COMMUNITY
Start: 2023-06-29 | End: 2023-07-19 | Stop reason: HOSPADM

## 2023-07-14 RX ORDER — NALOXONE HCL 0.4 MG/ML
0.02 VIAL (ML) INJECTION
Status: DISCONTINUED | OUTPATIENT
Start: 2023-07-14 | End: 2023-07-19 | Stop reason: HOSPADM

## 2023-07-14 RX ORDER — HEPARIN SODIUM 5000 [USP'U]/ML
5000 INJECTION, SOLUTION INTRAVENOUS; SUBCUTANEOUS EVERY 8 HOURS
Status: DISCONTINUED | OUTPATIENT
Start: 2023-07-14 | End: 2023-07-19 | Stop reason: HOSPADM

## 2023-07-14 RX ADMIN — VANCOMYCIN HYDROCHLORIDE 1750 MG: 500 INJECTION, POWDER, LYOPHILIZED, FOR SOLUTION INTRAVENOUS at 04:07

## 2023-07-14 RX ADMIN — METRONIDAZOLE 500 MG: 500 INJECTION, SOLUTION INTRAVENOUS at 04:07

## 2023-07-14 RX ADMIN — CEFEPIME 1 G: 1 INJECTION, POWDER, FOR SOLUTION INTRAMUSCULAR; INTRAVENOUS at 03:07

## 2023-07-14 RX ADMIN — ACYCLOVIR SODIUM 800 MG: 1000 INJECTION, SOLUTION INTRAVENOUS at 07:07

## 2023-07-14 RX ADMIN — ACYCLOVIR SODIUM 800 MG: 1000 INJECTION, SOLUTION INTRAVENOUS at 04:07

## 2023-07-14 RX ADMIN — CEFEPIME 2 G: 2 INJECTION, POWDER, FOR SOLUTION INTRAVENOUS at 09:07

## 2023-07-14 RX ADMIN — CEFEPIME 2 G: 2 INJECTION, POWDER, FOR SOLUTION INTRAVENOUS at 11:07

## 2023-07-14 RX ADMIN — VANCOMYCIN HYDROCHLORIDE 1250 MG: 1.25 INJECTION, POWDER, LYOPHILIZED, FOR SOLUTION INTRAVENOUS at 04:07

## 2023-07-14 RX ADMIN — SODIUM CHLORIDE 1000 ML: 9 INJECTION, SOLUTION INTRAVENOUS at 02:07

## 2023-07-14 RX ADMIN — SODIUM CHLORIDE 500 ML: 9 INJECTION, SOLUTION INTRAVENOUS at 07:07

## 2023-07-14 RX ADMIN — HEPARIN SODIUM 5000 UNITS: 5000 INJECTION INTRAVENOUS; SUBCUTANEOUS at 03:07

## 2023-07-14 RX ADMIN — METRONIDAZOLE 500 MG: 500 INJECTION, SOLUTION INTRAVENOUS at 12:07

## 2023-07-14 RX ADMIN — NALOXONE HYDROCHLORIDE 2 MG: 1 INJECTION PARENTERAL at 02:07

## 2023-07-14 RX ADMIN — ACYCLOVIR SODIUM 800 MG: 1000 INJECTION, SOLUTION INTRAVENOUS at 11:07

## 2023-07-14 RX ADMIN — NALOXONE HYDROCHLORIDE 2 MG: 0.4 INJECTION, SOLUTION INTRAMUSCULAR; INTRAVENOUS; SUBCUTANEOUS at 02:07

## 2023-07-14 RX ADMIN — METRONIDAZOLE 500 MG: 500 INJECTION, SOLUTION INTRAVENOUS at 09:07

## 2023-07-14 RX ADMIN — IOHEXOL 100 ML: 350 INJECTION, SOLUTION INTRAVENOUS at 10:07

## 2023-07-14 RX ADMIN — PIPERACILLIN SODIUM AND TAZOBACTAM SODIUM 4.5 G: 4; .5 INJECTION, POWDER, LYOPHILIZED, FOR SOLUTION INTRAVENOUS at 02:07

## 2023-07-14 RX ADMIN — IOHEXOL 100 ML: 350 INJECTION, SOLUTION INTRAVENOUS at 03:07

## 2023-07-14 NOTE — ED NOTES
Psychiatry Team at bedside said that they would come back in an hr or 2 because pt was deeply sleeping

## 2023-07-14 NOTE — CONSULTS
[facility] - Emergency Dept  Critical Care Medicine  Consult Note    Patient Name: Flako Pearce  MRN: [id number]  Admission Date: 7/13/2023  Hospital Length of Stay: 0 days  Code Status: Full Code  Attending Physician: Whit Washington MD  Primary Care Provider: Primary Doctor No   Principal Problem: Severe sepsis    Inpatient consult to Critical Care Medicine  Consult performed by: Chris Salter MD  Consult ordered by: Kofi Isaac MD        Subjective:     HPI:  Patient is a 33 y.o. male with HIV, syphilis, HSV, seizures, polysubstance use and psychiatric d/o presented to hospital on 7/14 for evaluation of bizarre behavior and homicidal ideation. At time of presentation, vitals were normal. Initial labs overall  Patient was PEC'd, awaiting inpatient psych placement. The patient was subsequently admitted to Hospital Medicine. ~ 05:30 the patient became hypotensive, bradycardic and hypothermic. Additionally he has become less responsive. He was given 2L IVF, however had persistent hypotension despite fluid resuscitation and was started on levo. LP was performed by the ED. Pt was started on empiric Flagyl/cefepime/acyclovir. CT head was negative for acute intracranial abnormalities. Critical Care consulted for depressed mental status and concern for airway.      Hospital/ICU Course:  No notes on file    Past Medical History:   Diagnosis Date    Bipolar 1 disorder     PHU (generalized anxiety disorder)     Panic disorder        No past surgical history on file.    Review of patient's allergies indicates:  No Known Allergies    Family History    None       Tobacco Use    Smoking status: Some Days     Types: Cigarettes    Smokeless tobacco: Never   Substance and Sexual Activity    Alcohol use: Yes     Comment: occasionally     Drug use: No    Sexual activity: Not on file      Review of Systems   Unable to perform ROS: Mental status change   Objective:     Vital Signs (Most Recent):  Temp: 97.6 °F (36.4 °C)  (07/14/23 0642)  Pulse: 77 (07/14/23 0647)  Resp: 14 (07/14/23 0647)  BP: (!) 97/51 (07/14/23 0647)  SpO2: 98 % (07/14/23 0647) Vital Signs (24h Range):  Temp:  [95 °F (35 °C)-98.6 °F (37 °C)] 97.6 °F (36.4 °C)  Pulse:  [] 77  Resp:  [13-20] 14  SpO2:  [96 %-100 %] 98 %  BP: ()/(50-73) 97/51   Weight: 72.6 kg (160 lb)  Body mass index is 21.11 kg/m².      Intake/Output Summary (Last 24 hours) at 7/14/2023 0659  Last data filed at 7/14/2023 0646  Gross per 24 hour   Intake 3250 ml   Output --   Net 3250 ml          Physical Exam  Vitals and nursing note reviewed.   Constitutional:       General: He is not in acute distress.     Appearance: He is normal weight. He is not toxic-appearing.   HENT:      Head: Normocephalic and atraumatic.      Right Ear: External ear normal.      Left Ear: External ear normal.      Mouth/Throat:      Mouth: Mucous membranes are moist.      Pharynx: Oropharynx is clear.   Eyes:      General: No scleral icterus.        Right eye: No discharge.         Left eye: No discharge.      Extraocular Movements: Extraocular movements intact.      Conjunctiva/sclera: Conjunctivae normal.   Cardiovascular:      Rate and Rhythm: Normal rate and regular rhythm.      Heart sounds: Normal heart sounds.   Pulmonary:      Effort: Pulmonary effort is normal. No respiratory distress.      Breath sounds: No stridor. No rales.   Abdominal:      Tenderness: There is no abdominal tenderness. There is no guarding.   Musculoskeletal:         General: No tenderness. Normal range of motion.      Cervical back: Normal range of motion and neck supple.      Right lower leg: No edema.      Left lower leg: No edema.   Skin:     General: Skin is warm and dry.   Neurological:      Mental Status: He is easily aroused. He is lethargic.      GCS: GCS eye subscore is 4. GCS verbal subscore is 5. GCS motor subscore is 6.      Comments: Easily aroused   Psychiatric:         Behavior: Behavior is cooperative.           Vents:     Lines/Drains/Airways       Peripheral Intravenous Line  Duration                  Peripheral IV - Single Lumen 07/13/23 1915 20 G Anterior;Proximal;Right Forearm <1 day         Peripheral IV - Single Lumen 07/14/23 0311 20 G Anterior;Left Forearm <1 day                  Significant Labs:    CBC/Anemia Profile:  Recent Labs   Lab 07/13/23 1922 07/14/23  0229 07/14/23  0440   WBC 8.59  --  5.50   HGB 15.1  --  11.9*   HCT 44.5 45 36.8*     --  206   MCV 87  --  92   RDW 13.4  --  13.7        Chemistries:  Recent Labs   Lab 07/13/23 1922 07/14/23 0440    137   K 3.9 3.4*    114*   CO2 20* 18*   BUN 12 8   CREATININE 1.2 0.9   CALCIUM 10.3 7.5*   ALBUMIN 4.7 3.1*   PROT 8.3 5.5*   BILITOT 0.8 0.4   ALKPHOS 86 59   ALT 26 15   AST 22 14       All pertinent labs within the past 24 hours have been reviewed.    Significant Imaging: I have reviewed all pertinent imaging results/findings within the past 24 hours.      ABG  Recent Labs   Lab 07/14/23  0727   PH 7.339*   PO2 100   PCO2 38.3   HCO3 20.6*   BE -5     Assessment/Plan:     ID  * Severe sepsis  Severe sepsis and likely superimposed toxidrome from ingested substances (+benzos & +amphetamines on tox panel). Fluid responsive; does no require vasopressor support at this time. Recommend continued aggressive IVF resuscitation with crystalloid. Goal MAP 65. Agree with broad spectrum antibiotics. Patient sleeping comfortably, protecting airway. Readily arousable, GCS 15. Recommend continuous pulse oximetry & end-tidal capnography. Recommend elevate HOB. Anticipate continued improvement in mental status with current therapies. No indication for critical care at this time.    HIV disease  Descovy and prezcobix in outpatient setting.       Critical care was time spent personally by me on the following activities: development of treatment plan with patient or surrogate and bedside caregivers, discussions with consultants, evaluation of  patient's response to treatment, examination of patient, ordering and performing treatments and interventions, ordering and review of laboratory studies, ordering and review of radiographic studies, pulse oximetry, re-evaluation of patient's condition. This critical care time did not overlap with that of any other provider or involve time for any procedures.    Thank you for your consult. I will sign off. Please contact us if you have any additional questions. Please call [phone] if questions/concerns.     Chris Salter MD  Critical Care Medicine  [facility] - Emergency Dept

## 2023-07-14 NOTE — SUBJECTIVE & OBJECTIVE
Past Medical History:   Diagnosis Date    Bipolar 1 disorder     PHU (generalized anxiety disorder)     Panic disorder        No past surgical history on file.    Review of patient's allergies indicates:  No Known Allergies    Family History    None       Tobacco Use    Smoking status: Some Days     Types: Cigarettes    Smokeless tobacco: Never   Substance and Sexual Activity    Alcohol use: Yes     Comment: occasionally     Drug use: No    Sexual activity: Not on file      Review of Systems   Unable to perform ROS: Mental status change   Objective:     Vital Signs (Most Recent):  Temp: 97.6 °F (36.4 °C) (07/14/23 0642)  Pulse: 77 (07/14/23 0647)  Resp: 14 (07/14/23 0647)  BP: (!) 97/51 (07/14/23 0647)  SpO2: 98 % (07/14/23 0647) Vital Signs (24h Range):  Temp:  [95 °F (35 °C)-98.6 °F (37 °C)] 97.6 °F (36.4 °C)  Pulse:  [] 77  Resp:  [13-20] 14  SpO2:  [96 %-100 %] 98 %  BP: ()/(50-73) 97/51   Weight: 72.6 kg (160 lb)  Body mass index is 21.11 kg/m².      Intake/Output Summary (Last 24 hours) at 7/14/2023 0659  Last data filed at 7/14/2023 0646  Gross per 24 hour   Intake 3250 ml   Output --   Net 3250 ml          Physical Exam  Vitals and nursing note reviewed.   Constitutional:       General: He is not in acute distress.     Appearance: He is normal weight. He is not toxic-appearing.   HENT:      Head: Normocephalic and atraumatic.      Right Ear: External ear normal.      Left Ear: External ear normal.      Mouth/Throat:      Mouth: Mucous membranes are moist.      Pharynx: Oropharynx is clear.   Eyes:      General: No scleral icterus.        Right eye: No discharge.         Left eye: No discharge.      Extraocular Movements: Extraocular movements intact.      Conjunctiva/sclera: Conjunctivae normal.   Cardiovascular:      Rate and Rhythm: Normal rate and regular rhythm.      Heart sounds: Normal heart sounds.   Pulmonary:      Effort: Pulmonary effort is normal. No respiratory distress.      Breath  sounds: No stridor. No rales.   Abdominal:      Tenderness: There is no abdominal tenderness. There is no guarding.   Musculoskeletal:         General: No tenderness. Normal range of motion.      Cervical back: Normal range of motion and neck supple.      Right lower leg: No edema.      Left lower leg: No edema.   Skin:     General: Skin is warm and dry.   Neurological:      Mental Status: He is easily aroused. He is lethargic.      GCS: GCS eye subscore is 4. GCS verbal subscore is 5. GCS motor subscore is 6.      Comments: Easily aroused   Psychiatric:         Behavior: Behavior is cooperative.          Vents:     Lines/Drains/Airways       Peripheral Intravenous Line  Duration                  Peripheral IV - Single Lumen 07/13/23 1915 20 G Anterior;Proximal;Right Forearm <1 day         Peripheral IV - Single Lumen 07/14/23 0311 20 G Anterior;Left Forearm <1 day                  Significant Labs:    CBC/Anemia Profile:  Recent Labs   Lab 07/13/23 1922 07/14/23  0229 07/14/23  0440   WBC 8.59  --  5.50   HGB 15.1  --  11.9*   HCT 44.5 45 36.8*     --  206   MCV 87  --  92   RDW 13.4  --  13.7        Chemistries:  Recent Labs   Lab 07/13/23 1922 07/14/23  0440    137   K 3.9 3.4*    114*   CO2 20* 18*   BUN 12 8   CREATININE 1.2 0.9   CALCIUM 10.3 7.5*   ALBUMIN 4.7 3.1*   PROT 8.3 5.5*   BILITOT 0.8 0.4   ALKPHOS 86 59   ALT 26 15   AST 22 14       All pertinent labs within the past 24 hours have been reviewed.    Significant Imaging: I have reviewed all pertinent imaging results/findings within the past 24 hours.

## 2023-07-14 NOTE — ASSESSMENT & PLAN NOTE
- PEC in place  - Consult inpatient psych  - Haldol PRN and Ativan PRN ordered for patient and staff safety.  - Acute psychiatric condition possibly 2/2 ongoing sepsis

## 2023-07-14 NOTE — PHARMACY MED REC
"Admission Medication History     The home medication history was taken by Kennedi Hensley.    You may go to "Admission" then "Reconcile Home Medications" tabs to review and/or act upon these items.     The home medication list has been updated by the Pharmacy department.   Please read ALL comments highlighted in yellow.   Please address this information as you see fit.    Feel free to contact us if you have any questions or require assistance.      The medications listed below were removed from the home medication list. Please reorder if appropriate:  Patient reports no longer taking the following medication(s):  BUPROPION  MG  DESCOVY 200-25 MG  DOXEPIN 25 MG  FOLIC ACID 1 MG  HALOPERIDOL 2 MG TABLET  MIRTAZAPINE 25 MG  MULTIVITAMIN  OLANZAPINE 15 MG  PREZCOBIX 800-150 MG    Medications listed below were obtained from: Patient/family and Analytic software- [employer]  Current Outpatient Medications on File Prior to Encounter   Medication Sig    haloperidol decanoate (HALDOL DECANOATE) 100 mg/mL injection Inject 50 mg into the muscle every 30 days.    pregabalin (LYRICA) 150 MG capsule Take 1 capsule (150 mg total) by mouth 2 (two) times daily.    propranoloL (INDERAL) 80 MG tablet Take 80 mg by mouth 2 (two) times a day.    SYMTUZA [phone] mg Tab Take 1 tablet by mouth once daily.    divalproex 500 MG Tb24 Take 500 mg by mouth once daily.   Pt states he takes every now and then. He doesn't like the side effects. Last filled 4-20-23 for 30 day supply.            Potential issues to be addressed PRIOR TO DISCHARGE  Please discuss with the patient barriers to adherence with medication treatment plans    Kennedi Hensley  EXT [phone]                  .        "

## 2023-07-14 NOTE — HPI
32 y/o M with PMH of HIV, seizure disorder, and schizophrenia presented with acute psychosis. Per ED personnel and records, pt was seen and medically cleared by previous team in [facility] ED, however while waiting for transfer to psych facility he became hypotensive, bradycardic, and hypothermic. Following this event, an extensive workup was performed including a additional sepsis evaluation, CT head, CT chest, CT abd/pelvis, lumbar puncture. He received fluids and empiric antibiotics and antivirals. He was also put on a rewarming device. BP improved s/p fluid resuscitation. He is not hypercapnic. Labs are still pending, but pt currently being treated for sepsis of unknown origin. Pt is still under PEC.    Following the initial events that incited full medical workup, my initial evaluation of the patient was difficult 2/2 pt somnolence. Pt would open his eyes briefly, but only after a light sternal rub. He would attempt to answer questions, but then quickly fell back asleep. I was unable to obtain any further/useful information from the patient regarding his condition.

## 2023-07-14 NOTE — ASSESSMENT & PLAN NOTE
Severe sepsis and likely superimposed toxidrome from ingested substances (+benzos & +amphetamines on tox panel). Fluid responsive; does no require vasopressor support at this time. Recommend continued aggressive IVF resuscitation with crystalloid. Goal MAP 65. Agree with broad spectrum antibiotics. Patient sleeping comfortably, protecting airway. Readily arousable, GCS 15. Recommend continuous pulse oximetry & end-tidal capnography. Recommend elevate HOB. Anticipate continued improvement in mental status with current therapies. No indication for critical care at this time.

## 2023-07-14 NOTE — PSYCH EVALUATION
"2023 1:27 PM   Flako Pearce   1990   [id number]            PSYCHIATRY INITIAL EVALUATION MEDICAL STUDENT NOTE      HISTORY OF PRESENT ILLNESS:    Flako Pearce, a 33 y.o. male, with Past psychiatric history of*** and Past medical history of presented to [facility] with ****** and Psychiatry consulted for ****  .   Met with {Relatives of adult:32396::"patient"}.      Collateral :- With pts permission spoke to Fernanda (mom) and Chantell (aunt):  Fernanda (mother): [phone]  Father: [phone]  Chantell (aunt): [phone]    The patient's mother/father and aunt live in [place]; they see the patient only a few times per year but speak frequently over the phone. Flako most recently visited his parents in [place] on , during which he was "extremely sleepy" and "not himself." His mother is concerned that he has been making 15-20 minute long videos on [email or web address] in which he repeats that he "does not have schizophrenia" and has conversations with people who are not physically there. She denies that Flako mentioned any suicidal ideation/intent in these videos or in general.    The patient's mother states that the patient started using substances (marijuana, LSD, psilocybin) around the age of 16 and has been to multiple rehab facilities between  to . She describes Flako as a smart, bright, and kind person throughout his teens and early 20s, and he completed a 4-year degree at [employer]. In the last 3-5 years, however, the patient has grown progressively paranoid and disorganized. In the last year, he has been witnessed speaking to  family members and is concerned about various people targeting his family (back in [place]).    PSYCHIATRIC REVIEW OF SYSTEMS - (Is patient experiencing or having changes in the following currently or previously):    Symptoms of Depression: diminished mood or loss of interest/anhedonia; irritability, diminished energy, change in sleep, change in " "appetite, diminished concentration or cognition or indecisiveness, PMA/R, excessive guilt or hopelessness or worthlessness, suicidal ideations - Passive/ Active ?, Suicide attempt- method  Onset was approximately {1-10:93389} {time; units:24019} ago. Symptoms have been {clinical course:17::"unchanged"} since that time.   Current symptoms include:    Symptoms of PHU: excessive anxiety/worry/fear, more days than not, about numerous issues, difficult to control, with restlessness, fatigue, poor concentration, irritability, muscle tension, sleep disturbance; causes functionally impairing distress   Onset was approximately {1-10:75955} {time; units:42776} ago. Symptoms have been {clinical course:17::"unchanged"} since that time.   Current symptoms include:    Symptoms of lurdes or hypomania: elevated, expansive, or irritable mood with increased energy or activity; with inflated self-esteem or grandiosity, decreased need for sleep, increased rate of speech,  racing thoughts, distractibility, increased goal directed activity or PMA, risky/disinhibited behavior  Onset was approximately {1-10:95933} {time; units:70753} ago. Symptoms have been {clinical course:17::"unchanged"} since that time.   Current symptoms include:    Symptoms of psychosis: hallucinations, delusions, disorganized thinking, disorganized behavior or abnormal motor behavior, or negative symptoms (diminshed emotional expression, avolition, anhedonia, alogia, asociality   Onset was approximately {1-10:32244} {time; units:87286} ago. Symptoms have been {clinical course:17::"unchanged"} since that time.   Current symptoms include:    Sleep: initiation, maintenance, early morning awakening with inability to return to sleep    Risk Parameters:  {parameters:39032::"Patient reports no suicidal ideation","Patient reports no homicidal ideation","Patient reports no self-injurious behavior","Patient reports no violent behavior"}    Other Psychiatric " symptoms    Symptoms of Panic Disorder: recurrent panic attacks, precipitated or un-precipitated, source of worry and/or behavioral changes secondary; with or without agoraphobia    Symptoms of PTSD: h/o trauma? Physical/ Sexual/ witnessed traumatic situation ; re-experiencing/intrusive symptoms, avoidant behavior, negative alterations in cognition or mood, or hyperarousal symptoms; with or without dissociative symptoms     Symptoms of OCD: obsessions, compulsions or ruminations    Symptoms of Eating Disorders: anorexia, bulimia or binging    Symptoms of ADHD: inattention or hyperactivity or dx in childhood?    HISTORY     CURRENT HOME  MEDS  Home Psychiatric Meds: ***  OTC Meds: *** herbal supplements***    PAST PSYCHIATRIC HISTORY  Previous Psychiatric Hospitalizations: ***   Previous Medication Trials: ***  Previous Suicide Attempts: ***/ Methods ?  History of Violence: ***   Psychiatric Care (current & past): ***  History of Psychotherapy: ***    SUBSTANCE ABUSE HISTORY   Tobacco: ***  Alcohol: ***  Illicit Substances: ***  Misuse of Prescription Medications: ***  Detoxes: ***  Rehabs: ***  12 Step Meetings: ***  Periods of Sobriety: ***  Withdrawal: ***    Substance Abuse/ Dependence: intoxication, withdrawal, tolerance, used in larger amounts or duration than intended, unsuccessful attempts to limit or quit, increased time engaging in or seeking out, cravings or strong desire to use, failure to fulfill obligations, negative consequences in social/interpersonal/occupational,/recreational areas, use in dangerous situations, medical or psychological consequences       FAMILY PSYCHIATRIC HISTORY       SOCIAL HISTORY  Developmental/Childhood: ***  History of Physical/Sexual Abuse: ***  Education: ***  , [facility]-  Employment: ***   Financial: ***   Relationship Status/Sexual Orientation: ***   Children: ***   Housing Status: ***  Roman [employer]: ***   History: ***   Recreational Activities: ***  Access to Gun:  "***     LEGAL HISTORY   Past Charges/Incarcerations:***   Pending Charges: ***    PAST MEDICAL & SURGICAL HISTORY   Past Medical History:   Diagnosis Date    Bipolar 1 disorder     PHU (generalized anxiety disorder)     Panic disorder      No past surgical history on file.    NEUROLOGIC HISTORY  Seizures: ***   Head trauma: ***     OBJECTIVE       Constitutional  Vitals:  Most recent vital signs, dated {PSY VITALS:86159} 90 days prior to this appointment, {WERE / WERE NOT:84193} reviewed.    Vitals:    07/14/23 0300 07/14/23 0315 07/14/23 0400 07/14/23 0450   BP: (!) 86/51 (!) 99/54 (!) 88/50 (!) 94/57   Pulse: (!) 59 (!) 57 (!) 58 62   Resp: 13  15 16   Temp:   (!) 95 °F (35 °C)    TempSrc:   Rectal    SpO2: 98% 100% 99% 100%   Weight:        07/14/23 0520 07/14/23 0545 07/14/23 0552 07/14/23 0620   BP: (!) 94/50 (!) 88/53  (!) 90/55   Pulse: 64 68  75   Resp: 14 16  13   Temp:   97.5 °F (36.4 °C)    TempSrc:   Oral    SpO2: 98% 99%  96%   Weight:        07/14/23 0642 07/14/23 0647 07/14/23 0719 07/14/23 0727   BP:  (!) 97/51     Pulse:  77  71   Resp:  14  16   Temp: 97.6 °F (36.4 °C)  97.5 °F (36.4 °C)    TempSrc: Oral  Oral    SpO2:  98%  96%   Weight:        07/14/23 0737 07/14/23 0917 07/14/23 1245   BP: (!) 94/52 (!) 96/51 (!) 102/53   Pulse: 73 70 80   Resp: 13 12 16   Temp:   97.5 °F (36.4 °C)   TempSrc:   Oral   SpO2: 96% 98% 100%   Weight:               Laboratory Data:   Pertinent Psych Labs- CBC, CMP, Thyroid, UTOX, EKG, Head CT?    Current Psych Medications:      Allergy:  Review of patient's allergies indicates:  No Known Allergies    Mental Status Exam:  Appearance: {appearance:00708::"unremarkable","age appropriate"}  Behavior/Cooperation:  {behavior:88580::"normal","cooperative"}  Speech: {findings; speech:30454::"normal tone","normal rate","normal pitch","normal volume"}  Language: uses words appropriately; NO aphasia or dysarthria  Mood: "  "  Affect:  congruent with mood and appropriate to " "situation/content  Thought Process:  {thought process:24567::"normal and logical"}  Thought Content: {normal:01242::"normal, no suicidality, no homicidality, delusions, or paranoia"}  Sensorium:  Awake/ Drowsy/ Somnolent  Orientation: Alert and Oriented x 4?  Memory:  Intact   3/3 immediate, 3/3 at 5 minutes    Recent:  Impaired/  Limited/ Intact; able to report recent events   Remote:  Impaired/  Limited/ Intact; Named 4/4 past presidents   Attention/concentration: appropriate for age/education/ impaired/ Tested- able to spell word "HOUSE" forwards and backwards  Abstraction: Able to abstract/ Unable to abstract/ concrete  Insight: Intact/ limited/ impaired  Judgment:Intact/ limited/ impaired    CAM ICU- Positive? Negative?      ASSESSMENT     Impression:     Primary diagnosis    Differential diagnosis      RECOMMENDATIONS       "

## 2023-07-14 NOTE — ED NOTES
Per lab, not enough CSF was sent for results. Hosp med made aware and was told not worry about it

## 2023-07-14 NOTE — ED NOTES
Rajwinder is on site to transport patient to [facility]. Patient's B/P is 91/56 & [facility] staff states may not be able to transport because of the low B/P. Patient is somnolent,  minimal arousal w/ mild sternal rubs, keeps eyes closed & mumbles incoherently. Returned to room to obtain a rectal temperature as is unable to obtain an oral or axillary temperature ,95.9F rectal. Bear Hug heating blanket provided. Dr. Reed notified of patient's status by nurse Sheeba ESPOSITO

## 2023-07-14 NOTE — PROVIDER PROGRESS NOTES - EMERGENCY DEPT.
Signout Note    I received signout from the previous provider.     Chief complaint:  Paranoid      Pertinent history &exam:  Flako Pearce is a 33 y.o. male with pertinent PMH of HIV, seizure disorder, psychosis, polysubstance abuse who presented to emergency department with complaint of paranoia.  Placed under pec.  Signed out pending remainder of labs and urine for medical clearance.    Vitals:    07/13/23 2023   BP: (!) 103/55   Pulse: 78   Resp: 20   Temp:        Imaging Studies:    No orders to display       Medications Given:  Medications   haloperidol lactate injection 5 mg (5 mg Intramuscular Given 7/13/23 1845)   LORazepam injection 2 mg (2 mg Intramuscular Given 7/13/23 1845)   sodium chloride 0.9% bolus 1,000 mL 1,000 mL (0 mLs Intravenous Stopped 7/13/23 2025)       Pending Items/ MDM:  33 y.o. male with acute psychosis under pec.  Afebrile and hemodynamically stable.  There is no acute abnormality the precludes this patient's transfer to psychiatric facility at this time.    Diagnostic Impression:    1. Acute psychosis    2. Medical clearance for psychiatric admission         ED Disposition Condition    Transfer to Psych Facility Stable          ED Prescriptions    None       Follow-up Information    None           Emeli Reed MD  Emergency Medicine

## 2023-07-14 NOTE — ED NOTES
Pt care assumed. Report received by VILMA Aly. Pt lying in stretcher in low and locked position and side rails raised x2.  Pt on continuous cardiac monitoring, pulse oximetry, and BP cycling every 30 minutes. Pt in NAD and verbalized no needs at this time. Sitter at bedside.

## 2023-07-14 NOTE — ED NOTES
Patient identifiers for Flako Pearce 33 y.o. male checked and correct.  Chief Complaint   Patient presents with    Paranoid     Past Medical History:   Diagnosis Date    Bipolar 1 disorder     PHU (generalized anxiety disorder)     Panic disorder      Allergies reported: Review of patient's allergies indicates:  No Known Allergies      LOC: Patient nodding off during conversation, responds to voice. Patient is oriented x2 APPEARANCE: Patient resting comfortably and in no acute distress. Patient is clean and well groomed, patient's clothing is properly fastened.  SKIN: The skin is warm and dry. Patient has normal skin turgor and moist mucus membranes.   MUSKULOSKELETAL: Patient is moving all extremities well, no obvious deformities noted. Pulses intact.   RESPIRATORY: Airway is open and patent. Respirations are spontaneous and non-labored with normal effort and rate.  CARDIAC: Patient has a normal rate and rhythm  on cardiac monitor. No peripheral edema noted.   ABDOMEN: No distention noted. Soft and non-tender upon palpation.  NEUROLOGICAL:  PERRL. Facial expression is symmetrical. Hand grasps are equal bilaterally. Normal sensation in all extremities when touched with finger.

## 2023-07-14 NOTE — PROVIDER PROGRESS NOTES - EMERGENCY DEPT.
EVENT NOTE    Notified by RN that patient is hypotensive, bradycardic, hypothermic.  Yosef Hugger placed.  Two IVs established.  Patient given 2 L of NS for fluid resuscitation.  Levophed drip ordered for hypotension.  Patient is not arousable to painful stimuli.  Patient will withdraw to pain but not able to participate in conversation.  EKG showing sinus bradycardia.  I-STAT shows no significant electrolyte abnormalities.  No hypercapnic respiratory acidosis on VBG.    Concern for sepsis since patient's hypothermic and hypotensive in the setting of HIV.  Upon chart review, patient has history of syphilis, unsure if he has completed treatment.  Additionally, patient has history of HSV.  Will administer broad-spectrum antibiotics to cover CNS infections with Flagyl/cefepime/acyclovir.  No evidence of aspiration on chest x-ray.  CT head showing no acute intracranial pathology.    Lumbar Puncture    Date/Time: 7/14/2023 5:47 AM  Location procedure was performed: [facility] EMERGENCY DEPARTMENT  Performed by: Whit Luu MD  Authorized by: Emeli Reed MD   Consent Done: Emergent Situation  Indications: evaluation for infection  Anesthesia: local infiltration    Anesthesia:  Local Anesthetic: lidocaine 1% without epinephrine  Anesthetic total: 3 mL  Preparation: Patient was prepped and draped in the usual sterile fashion.  Lumbar space: L3-L4 interspace  Patient's position: right lateral decubitus  Needle gauge: 22  Needle type: spinal needle - Quincke tip  Needle length: 2.5 in  Number of attempts: 1  Fluid appearance: clear  Tubes of fluid: 4  Total volume: 6 ml  Post-procedure: site cleaned, pressure dressing applied and adhesive bandage applied  Complications: No      After lumbar puncture, patient arousable to verbal stimuli.  Patient able to answer questions.  Asking to eat.  GCS of 15.    Discussed patient's case with Hospital Medicine who agreed to admit patient to their service for IV antibiotics for  septic shock.  Possibly encephalitis or neurosyphilis.      Critical Care Procedure Note    Authorized and Performed by: Emeli Reed    Total critical care time: Approximately 75 minutes    This patient never actually required Levophed drip that was ordered.  He was aggressively fluid resuscitated received antibiotics.  VBG without hypercapnia.  Sepsis evaluation obtained, normal lactate, i-STAT reviewed.  We obtained chest x-ray, CT head, lumbar puncture.  He is somnolent but arousable.  On rewarming device.  Admitted to step-down.    Due to a high probability of clinically significant, life threatening deterioration, the patient required my highest level of preparedness to intervene emergently and I personally spent this critical care time directly and personally managing the patient. This critical care time included obtaining a history; examining the patient; pulse oximetry; ordering and review of studies; arranging urgent treatment with development of a management plan; evaluation of patient's response to treatment; frequent reassessment; and, discussions with other providers.    This critical care time was performed to assess and manage the high probability of imminent, life-threatening deterioration that could result in multi-organ failure. It was exclusive of separately billable procedures and treating other patients and teaching time.

## 2023-07-14 NOTE — NURSING
..Nurses Note -- 4 Eyes      7/14/2023   5:36 PM      Skin assessed during: Admit      [x] No Altered Skin Integrity Present    []Prevention Measures Documented      [] Yes- Altered Skin Integrity Present or Discovered   [] LDA Added if Not in Epic (Describe Wound)   [] New Altered Skin Integrity was Present on Admit and Documented in LDA   [] Wound Image Taken    Wound Care Consulted? No    Attending Nurse:  Buddy Chauhan RN     Second RN/Staff Member:  Sonia Arriola

## 2023-07-14 NOTE — ASSESSMENT & PLAN NOTE
- Sepsis of unknown origin  - Continue broad spectrum coverage with Vanc, Cefepime, Acyclovir  - Consulted MICU 2/2 hypotension in the ED, additional workup requested, results pending.  - continue fluid resuscitation

## 2023-07-14 NOTE — CONSULTS
"CONSULTATION LIAISON PSYCHIATRY INITIAL EVALUATION    Patient Name: Flako Pearce  MRN: [id number]  Patient Class: IP- Inpatient  Admission Date: 7/13/2023  Attending Physician: Whit Washington MD      HPI:   Flako Pearce is a 33 y.o. male with past psychiatric history of bipolar I disorder, SIPD, amphetamine use disorder, sedative, hypnotic, or anxiolytic (benzodiazepine) use disorder, and anxiety & past pertinent medical history of seizures, latent syphyllis treated on 07/2022, HIV on descovy and prezcobix, presents to the ED/admitted to the hospital for Severe sepsis    Per ED RN:   His affect is flat, appears drowsy as if under the influence of intoxicants or illicit's, mood is anxious. Demeanor is pleasant, cooperative. Patient has rambling speech, tangentiality, FOI. He states the reason for his presentation to the ED as, " same day eviction."  He presented to the ED w/ an extra large bag of belongings.He denies S/HI, A/VH. He initially answered "yes" to HI citing his landlord, Ms. Hebert, "if she's not already dead.;but I didn't kill her". He endorses Pentobarbital as his drug of choice although he also use other drugs. He denies "opiods:& if they find any somebody put it in there."    Per primary team:  Patient is a 33 y.o. male with HIV, syphilis, HSV, seizures, polysubstance use and psychiatric d/o presented to hospital on 7/14 for evaluation of bizarre behavior and homicidal ideation. At time of presentation, vitals were normal. Initial labs overall  Patient was PEC'd, awaiting inpatient psych placement. The patient was subsequently admitted to Hospital Medicine. ~ 05:30 the patient became hypotensive, bradycardic and hypothermic. Additionally he has become less responsive. He was given 2L IVF, however had persistent hypotension despite fluid resuscitation and was started on levo. LP was performed by the ED. Pt was started on empiric Flagyl/cefepime/acyclovir. CT head was negative for acute " "intracranial abnormalities. Critical Care consulted for depressed mental status and concern for airway.    Pt admitted for Severe sepsis and likely superimposed toxidrome from ingested substances (+benzos & +amphetamines on tox panel).       Psychiatry consulted for PEC for grave disability / psychosis    Pt seen and chart reviewed. Pt received PRN IM Haldol 5mg and IM Ativan 2mg at time 1910 yesterday (of note, UDS was + for benzos and amphetamines; was collected prior to PRN administration).   He's had several inpatient psych hospitalization before, most recently 3/19/23-3/23/23; he was discharged on: Haldol D 50mg q30 days, Haldol (PO) 2mg TID, doxepin 25mg qPM, and Lyrica 150mg BID (long-term med for neuropathic pain and off-label for anxiety (?)).  Hx of amphetamine abuse (cocaine, Adderall, etc.) as well as benzos; hx of increasing ETOH intake when runs out of benzos.     Per [facility] :  - Lyrica 150mg, #60, 30 day supply; last filled 6/13/23  - Last Klonopin 0.5mg prescription (#8, 8 day supply) filled 4/20/23  - No record of amphetamine prescriptions in >2 years  - Hx of opiate use d/o, previously on MAT; most recent prescription was Buprenorphine 8 Mg Tablet Sl, #60, 30 day supply, last filled 11/16/21    Pt seen this AM; was noted to be somnolent, minimally arousable with sternal rub. Seen again several hours later; was noted to be drowsy, in NAD. Oriented x 4. Was noted to be rambling, tangential at times, w/ some possible paranoia. Stated that he presented to the hospital because his landlord served him an eviction yesterday "because our family is related to the Jasvir's and they all want to kill each other." Endorsed taking Haldol PO (didn't know dose), Lyrica, propranolol and also getting Haldol D 50mg qmonthly; stated last Haldol D administration was "about a month ago." Endorsed feeling depressed recently, denied any anxiety. Stated he works at [employer]. Takes Klonopin ("prescribed small amounts" " "per pt (although per , last Rx was 4/20/23, but also takes recreationally) and injects pentobarbital "to help with sleep." Uses stimu  lants (Adderall, meth, cocaine) to help him stay awake while working (he works nights). Also snorts or injects ketamine recreationally. Last ETOH use was 2 years ago. Endorsed hx of seizures ("when (he) stopped drinking" and when he runs out of benzos).   Per chart review, has previous dx of bipolar disorder; has described manic/hypomanic episodes in the past as "decreased need for sleep, spent a lot of money on shopping"; unclear if these ever occurred outside of active substance use.   Denied SI/HI/AVH.   Expressed concerns about being in the hospital and missing work today. Requested to be placed on a "detox protocol" given his hx of benzo and ETOH w/d seizures.       Collateral with patient's permission (obtained by Christiano Coughlin):  Collateral :- With pts permission spoke to Fernanda (mom) and Chantell (aunt):  Fernanda (mother): [phone]  Father: [phone]  Chantell (aunt): [phone]     The patient's mother/father and aunt live in [place]; they see the patient only a few times per year but speak frequently over the phone. Flako most recently visited his parents in [place] on July 2nd, during which he was "extremely sleepy" and "not himself." His mother is concerned that he has been making 15-20 minute long videos on Facebook in which he repeats that he "does not have schizophrenia" and has conversations with people who are not physically there. She denies that Flako mentioned any suicidal ideation/intent in these videos or in general.     The patient's mother states that the patient started using substances (marijuana, LSD, psilocybin) around the age of 16 and has been to multiple rehab facilities between 2012 to 2022. She describes Flako as a smart, bright, and kind person throughout his teens and early 20s, and he completed a 4-year degree at [employer], [place]. In the last 3-5 years, " "however, the patient has grown progressively paranoid and disorganized. In the last year, he has been witnessed speaking to  family members and is concerned about various people targeting his family (back in [place]).    Medical Review of Systems:  Pertinent items are noted in HPI.    Psychiatric Review of Systems (is patient experiencing or having changes in):  sleep: yes, endorsed poor sleep at home (takes benzos to help with sleep)  appetite: no  weight: no  energy/anergy: no  interest/pleasure/anhedonia: no  somatic symptoms: no  libido: not asked  anxiety/panic: no  guilty/hopelessness: no  concentration: no  Sarah:no  Psychosis: yes  Trauma: no  S.I.B.s/risky behavior: no    Obtained via chart review and updated as appropriate  Past Psychiatric History:  Previous Medication Trials: yes, subutex, valium, clonazepam, ativan, remeron, vistaril, haldol, zyprexa, lurasidone, geodon, lamictal, seroquel, wellbutrin  Previous Psychiatric Hospitalizations: yes, most recently 3/19/23-3/23/23 at [facility] (had presented for "command hallucinations"; UDS was+ THC, benzodiazepines, amphetamines)  Previous Suicide Attempts: yes; cut wrists in 2017  History of Violence: no  Outpatient Psychiatrist: YES: Dr Tor Sanchez   Family Psychiatric History: Yes - "I don't know exactly who or what"    Substance Abuse History (with emphasis over the last 12 months):  Recreational Drugs: YES: adderall, cocaine, benzodiazepines  Use of Alcohol: denied  Tobacco Use:yes, smokes cigarettes 2-3 times a day  Rehab History:yes     Social History:  Developmental/Childhood: Achieved all developmental milestones timely "I was diagnosed with ADHD at 7 yo and had to be put on ritalin."  Marital Status: single  Children: 0  Employment Status/Info: Employed supervisor at [employer]  : no  Education: Bachelor's Degree "2 bachelor's of sciences degrees"  Special Ed: SIDNEY  Housing Status: alone  Access to gun: " "no  Psychosocial Stressors: SIDNEY  Functioning Relationships: SIDNEY    Legal History:  Past Charges/Incarcerations: no  Pending charges:no    Mental Status Exam:  General Appearance: dressed in hospital garb, poorly groomed, disheveled, in NAD  Behavior:  calm, cooperative, minimal eye contact  Involuntary Movements and Motor Activity: no abnormal involuntary movements noted; no tics, no tremors, no akathisia, no dystonia, no evidence of tardive dyskinesia; no psychomotor agitation or retardation  Gait and Station: unable to assess - patient lying down or seated  Speech and Language: spontaneous, soft, monotone  Mood: "Ok"  Affect: blunted  Thought Process and Associations: tangential, somewhat disorganized  Thought Content and Perceptions:: + paranoid ideation, denied SI/HI/AVH, no objective signs of hallucinations  Sensorium and Orientation: drowsy, oriented fully (to person, place, time and situation)  Recent and Remote Memory: grossly intact  Attention and Concentration: grossly intact  Fund of Knowledge: grossly intact  Insight: limited/partial awareness of illness  Judgment: behavior is adequate/appropriate to circumstances    CAM ICU positive? no      ASSESSMENT & RECOMMENDATIONS   (Please list each relevant SPECIFIC psychiatric DSMV or medical diagnosis and recs for it under the listing DO NOT WRITE AN IMPRESSION PARAGRAPH!!)    Psychosis NOS (suspect SIPD)  PSYCH MEDICATIONS  Re-start home Haldol 2 mg TID  PRN Haldol 5 mg PO/IM and Ativan 2 mg PO/IM q6h for non-redirectable agitation  Monitor QTc (QTc 7/14/23: 421 ms)    Sedative, hypnotic, or anxiolytic (benzodiazepine) use disorder  PSYCH MEDICATIONS  Would recommend starting Ativan taper; can start with Ativan 2 mg TID  Monitor for s/sx of benzodiazepine withdrawal and utilize Ativan PRN for breakthrough symptoms per CIWA protocol  Ativan 2mg PO/IM q6hr PRN for any two of the following: SBP>160, DBP>100, HR>100, tremor, or diaphoresis      OTHER PERTINENT " DIAGNOSIS  Amphetamine use disorder  Polysubstance abuse (benzos, stimulants, Ketamine, pentobarbital)    RISK ASSESSMENT  Continue PEC/CEC for grave disability & NEEDS 1:1 sitter    FOLLOW UP  Will follow up while in house    DISPOSITION - once medically cleared:   Seek involuntary inpatient psychiatric admission for stabilization of acute psychiatric symptoms and a safe disposition plan is enacted. The patient &/or their family was informed that the patient will be transferred to an inpatient unit per ED/primary placement team.       Please contact ON CALL psychiatry service (24/7) for any acute issues that may arise.    Dr. Lindy Jewell   Psychiatry  [facility]  7/14/2023 8:57 AM        --------------------------------------------------------------------------------------------------------------------------------------------------------------------------------------------------------------------------------------    CONTINUED HISTORY & OBJECTIVE clinical data & findings reviewed and noted for above decision making    Past Medical/Surgical History:   Past Medical History:   Diagnosis Date    Bipolar 1 disorder     PHU (generalized anxiety disorder)     Panic disorder      No past surgical history on file.    Current Medications:   Scheduled Meds:    acyclovir  10 mg/kg (Ideal) Intravenous Q8H    ceFEPime (MAXIPIME) IVPB  2 g Intravenous Q8H    heparin (porcine)  5,000 Units Subcutaneous Q8H    metronidazole  500 mg Intravenous Q8H    vancomycin (VANCOCIN) IV (PEDS and ADULTS)  1,250 mg Intravenous Q12H     PRN Meds: acetaminophen, dextrose 10%, dextrose 10%, dextrose, dextrose, glucagon (human recombinant), haloperidol lactate, lorazepam, naloxone, ondansetron, sodium chloride 0.9%, Pharmacy to dose Vancomycin consult **AND** vancomycin - pharmacy to dose    Allergies:   Review of patient's allergies indicates:  No Known Allergies    Vitals  Vitals:    07/14/23 0737   BP: (!)  94/52   Pulse: 73   Resp: 13   Temp:        Labs/Imaging/Studies:  Recent Results (from the past 24 hour(s))   Urinalysis, Reflex to Urine Culture Urine, Clean Catch    Collection Time: 07/13/23  7:19 PM    Specimen: Urine   Result Value Ref Range    Specimen UA Urine, Clean Catch     Color, UA Yellow Yellow, Straw, Juliette    Appearance, UA Clear Clear    pH, UA 7.0 5.0 - 8.0    Specific Gravity, UA 1.010 1.005 - 1.030    Protein, UA Negative Negative    Glucose, UA Negative Negative    Ketones, UA Negative Negative    Bilirubin (UA) Negative Negative    Occult Blood UA Negative Negative    Nitrite, UA Negative Negative    Leukocytes, UA Negative Negative   Drug screen panel, emergency    Collection Time: 07/13/23  7:19 PM   Result Value Ref Range    Benzodiazepines Presumptive Positive (A) Negative    Methadone metabolites Negative Negative    Cocaine (Metab.) Negative Negative    Opiate Scrn, Ur Negative Negative    Barbiturate Screen, Ur Negative Negative    Amphetamine Screen, Ur Presumptive Positive (A) Negative    THC Negative Negative    Phencyclidine Negative Negative    Creatinine, Urine 68.0 23.0 - 375.0 mg/dL    Toxicology Information SEE COMMENT    CBC auto differential    Collection Time: 07/13/23  7:22 PM   Result Value Ref Range    WBC 8.59 3.90 - 12.70 K/uL    RBC 5.09 4.60 - 6.20 M/uL    Hemoglobin 15.1 14.0 - 18.0 g/dL    Hematocrit 44.5 40.0 - 54.0 %    MCV 87 82 - 98 fL    MCH 29.7 27.0 - 31.0 pg    MCHC 33.9 32.0 - 36.0 g/dL    RDW 13.4 11.5 - 14.5 %    Platelets 321 150 - 450 K/uL    MPV 9.8 9.2 - 12.9 fL    Immature Granulocytes 0.2 0.0 - 0.5 %    Gran # (ANC) 5.2 1.8 - 7.7 K/uL    Immature Grans (Abs) 0.02 0.00 - 0.04 K/uL    Lymph # 2.5 1.0 - 4.8 K/uL    Mono # 0.8 0.3 - 1.0 K/uL    Eos # 0.1 0.0 - 0.5 K/uL    Baso # 0.04 0.00 - 0.20 K/uL    nRBC 0 0 /100 WBC    Gran % 60.2 38.0 - 73.0 %    Lymph % 29.0 18.0 - 48.0 %    Mono % 8.7 4.0 - 15.0 %    Eosinophil % 1.4 0.0 - 8.0 %    Basophil % 0.5  0.0 - 1.9 %    Differential Method Automated    Comprehensive metabolic panel    Collection Time: 07/13/23  7:22 PM   Result Value Ref Range    Sodium 139 136 - 145 mmol/L    Potassium 3.9 3.5 - 5.1 mmol/L    Chloride 107 95 - 110 mmol/L    CO2 20 (L) 23 - 29 mmol/L    Glucose 71 70 - 110 mg/dL    BUN 12 6 - 20 mg/dL    Creatinine 1.2 0.5 - 1.4 mg/dL    Calcium 10.3 8.7 - 10.5 mg/dL    Total Protein 8.3 6.0 - 8.4 g/dL    Albumin 4.7 3.5 - 5.2 g/dL    Total Bilirubin 0.8 0.1 - 1.0 mg/dL    Alkaline Phosphatase 86 55 - 135 U/L    AST 22 10 - 40 U/L    ALT 26 10 - 44 U/L    eGFR >60.0 >60 mL/min/1.73 m^2    Anion Gap 12 8 - 16 mmol/L   TSH    Collection Time: 07/13/23  7:22 PM   Result Value Ref Range    TSH 3.217 0.400 - 4.000 uIU/mL   Ethanol    Collection Time: 07/13/23  7:22 PM   Result Value Ref Range    Alcohol, Serum <10 <10 mg/dL   Acetaminophen level    Collection Time: 07/13/23  7:22 PM   Result Value Ref Range    Acetaminophen (Tylenol), Serum <3.0 (L) 10.0 - 20.0 ug/mL   POCT glucose    Collection Time: 07/14/23  2:18 AM   Result Value Ref Range    POCT Glucose 88 70 - 110 mg/dL   ISTAT PROCEDURE    Collection Time: 07/14/23  2:23 AM   Result Value Ref Range    POC Glucose 91 70 - 110 mg/dL    POC BUN 12 6 - 30 mg/dL    POC Creatinine 1.1 0.5 - 1.4 mg/dL    POC Sodium 137 136 - 145 mmol/L    POC Potassium 8.4 (HH) 3.5 - 5.1 mmol/L    POC Chloride 108 95 - 110 mmol/L    POC TCO2 (MEASURED) 24 23 - 29 mmol/L    POC Ionized Calcium 1.04 (L) 1.06 - 1.42 mmol/L    POC Hematocrit 38 36 - 54 %PCV    Sample ADRIANA    ISTAT PROCEDURE    Collection Time: 07/14/23  2:25 AM   Result Value Ref Range    POC PH 7.299 (L) 7.35 - 7.45    POC PCO2 47.4 (H) 35 - 45 mmHg    POC PO2 38 (L) 40 - 60 mmHg    POC HCO3 23.3 (L) 24 - 28 mmol/L    POC BE -3 -2 to 2 mmol/L    POC SATURATED O2 66 (L) 95 - 100 %    POC TCO2 25 24 - 29 mmol/L    Sample VENOUS     Site Other     Allens Test N/A     DelSys Room Air     Mode SPONT     FiO2 21     ISTAT PROCEDURE    Collection Time: 07/14/23  2:29 AM   Result Value Ref Range    POC Glucose 92 70 - 110 mg/dL    POC BUN 10 6 - 30 mg/dL    POC Creatinine 1.4 0.5 - 1.4 mg/dL    POC Sodium 142 136 - 145 mmol/L    POC Potassium 3.9 3.5 - 5.1 mmol/L    POC Chloride 105 95 - 110 mmol/L    POC TCO2 (MEASURED) 23 23 - 29 mmol/L    POC Ionized Calcium 1.09 1.06 - 1.42 mmol/L    POC Hematocrit 45 36 - 54 %PCV    Sample ADRIANA    Lactic acid, plasma    Collection Time: 07/14/23  2:32 AM   Result Value Ref Range    Lactate (Lactic Acid) 0.8 0.5 - 2.2 mmol/L   Procalcitonin    Collection Time: 07/14/23  2:32 AM   Result Value Ref Range    Procalcitonin <0.02 <0.25 ng/mL   Ammonia    Collection Time: 07/14/23  2:38 AM   Result Value Ref Range    Ammonia 20 10 - 50 umol/L   Comprehensive metabolic panel    Collection Time: 07/14/23  4:40 AM   Result Value Ref Range    Sodium 137 136 - 145 mmol/L    Potassium 3.4 (L) 3.5 - 5.1 mmol/L    Chloride 114 (H) 95 - 110 mmol/L    CO2 18 (L) 23 - 29 mmol/L    Glucose 89 70 - 110 mg/dL    BUN 8 6 - 20 mg/dL    Creatinine 0.9 0.5 - 1.4 mg/dL    Calcium 7.5 (L) 8.7 - 10.5 mg/dL    Total Protein 5.5 (L) 6.0 - 8.4 g/dL    Albumin 3.1 (L) 3.5 - 5.2 g/dL    Total Bilirubin 0.4 0.1 - 1.0 mg/dL    Alkaline Phosphatase 59 55 - 135 U/L    AST 14 10 - 40 U/L    ALT 15 10 - 44 U/L    eGFR >60.0 >60 mL/min/1.73 m^2    Anion Gap 5 (L) 8 - 16 mmol/L   CBC auto differential    Collection Time: 07/14/23  4:40 AM   Result Value Ref Range    WBC 5.50 3.90 - 12.70 K/uL    RBC 3.99 (L) 4.60 - 6.20 M/uL    Hemoglobin 11.9 (L) 14.0 - 18.0 g/dL    Hematocrit 36.8 (L) 40.0 - 54.0 %    MCV 92 82 - 98 fL    MCH 29.8 27.0 - 31.0 pg    MCHC 32.3 32.0 - 36.0 g/dL    RDW 13.7 11.5 - 14.5 %    Platelets 206 150 - 450 K/uL    MPV 9.8 9.2 - 12.9 fL    Immature Granulocytes 0.2 0.0 - 0.5 %    Gran # (ANC) 2.5 1.8 - 7.7 K/uL    Immature Grans (Abs) 0.01 0.00 - 0.04 K/uL    Lymph # 2.2 1.0 - 4.8 K/uL    Mono # 0.6 0.3 - 1.0  K/uL    Eos # 0.1 0.0 - 0.5 K/uL    Baso # 0.03 0.00 - 0.20 K/uL    nRBC 0 0 /100 WBC    Gran % 45.3 38.0 - 73.0 %    Lymph % 40.0 18.0 - 48.0 %    Mono % 11.6 4.0 - 15.0 %    Eosinophil % 2.4 0.0 - 8.0 %    Basophil % 0.5 0.0 - 1.9 %    Differential Method Automated    Brain natriuretic peptide    Collection Time: 07/14/23  4:40 AM   Result Value Ref Range    BNP 47 0 - 99 pg/mL   Troponin I    Collection Time: 07/14/23  4:40 AM   Result Value Ref Range    Troponin I <0.006 0.000 - 0.026 ng/mL   Lipase    Collection Time: 07/14/23  4:40 AM   Result Value Ref Range    Lipase 31 4 - 60 U/L   Glucose, CSF (Tube 1)    Collection Time: 07/14/23  4:49 AM   Result Value Ref Range    CSF Tube Number 1     Glucose, CSF 60 40 - 70 mg/dL   Protein, CSF (Tube 1)    Collection Time: 07/14/23  4:49 AM   Result Value Ref Range    CSF Tube Number 1     Protein, CSF 36 15 - 40 mg/dL   CMV DNA PCR QUAL (NON-BLOOD) Cerebrospinal Fluid    Collection Time: 07/14/23  4:49 AM   Result Value Ref Range    CMV DNA Source Cerebrospinal Fluid    M. tuberculosis DNA by PCR    Collection Time: 07/14/23  4:49 AM   Result Value Ref Range    MTB Specimen Source Cerebrospinal Fluid    Toxoplasma Gondii/PCR, Non-blood    Collection Time: 07/14/23  4:49 AM   Result Value Ref Range    T. gondii Source Cerebrospinal Fluid    Lactic acid, plasma    Collection Time: 07/14/23  7:09 AM   Result Value Ref Range    Lactate (Lactic Acid) 0.6 0.5 - 2.2 mmol/L   Hepatitis Panel, Acute    Collection Time: 07/14/23  7:09 AM   Result Value Ref Range    Hepatitis B Surface Ag Non-reactive Non-reactive    Hep B C IgM Non-reactive Non-reactive    Hep A IgM Non-reactive Non-reactive    Hepatitis C Ab Non-reactive Non-reactive   ISTAT PROCEDURE    Collection Time: 07/14/23  7:27 AM   Result Value Ref Range    POC PH 7.339 (L) 7.35 - 7.45    POC PCO2 38.3 35 - 45 mmHg    POC PO2 100 80 - 100 mmHg    POC HCO3 20.6 (L) 24 - 28 mmol/L    POC BE -5 -2 to 2 mmol/L    POC  SATURATED O2 97 95 - 100 %    POC TCO2 22 (L) 23 - 27 mmol/L    Sample ARTERIAL     Site LR     Allens Test Pass     DelSys Room Air      Imaging Results              CT Head With Contrast (Final result)  Result time 07/14/23 04:47:23      Final result by Js Garrido MD (07/14/23 04:47:23)                   Impression:      The ventricular system and sulci appear prominent for age, this may relate to early involutional change, clinical and historical correlation is needed.    There is no additional evidence for acute intracranial process.    Empty sella configuration.    Electronically signed by resident: Martha Hyde  Date:    07/14/2023  Time:    04:16    Electronically signed by: Js Garrido  Date:    07/14/2023  Time:    04:47               Narrative:    EXAMINATION:  CT HEAD WITH CONTRAST    CLINICAL HISTORY:  Meningitis/CNS infection suspected;Mental status change, unknown cause;    TECHNIQUE:  Low dose axial CT images obtained throughout the head before and after the IV administration of 100 mL of Omnipaque 350.  Sagittal and coronal reconstructions were performed.    COMPARISON:  None.    FINDINGS:  Intracranial compartment:    Prominence of the ventricles and sulci out of proportion for patient's age may relate to early involutional change, clinical and historical correlation is needed no extra-axial blood or fluid collections.    The brain parenchyma otherwise appears within normal limits normal.  No parenchymal mass, hemorrhage, edema or major vascular distribution infarct.  No abnormal enhancement.    Empty sella configuration.    Skull/extracranial contents (limited evaluation):    No fracture. Mastoid air cells and paranasal sinuses are essentially clear.                                       X-Ray Chest 1 View (Final result)  Result time 07/14/23 04:28:59      Final result by sJ Garrido MD (07/14/23 04:28:59)                   Impression:      Mild diminished depth of inspiration  and mild atelectatic change without evidence for superimposed acute intrathoracic process.      Electronically signed by: Js Garrido  Date:    07/14/2023  Time:    04:28               Narrative:    EXAMINATION:  XR CHEST 1 VIEW    CLINICAL HISTORY:  Hypoxemia    TECHNIQUE:  Single frontal view of the chest was performed.    COMPARISON:  None    FINDINGS:  Single chest view is submitted.  There is diminished depth of inspiration and mild rotation, when accounting for these factors the cardiomediastinal silhouette appears appropriate.    Mild accentuation of pulmonary bronchovascular markings consistent with diminished depth of inspiration noted.  Mild atelectatic change noted.  There is no evidence for superimposed confluent infiltrate or consolidation, significant pleural effusion or pneumothorax.    The osseous structures appear intact.

## 2023-07-14 NOTE — H&P
[facility]  [facility]  History & Physical    Patient Name: Flako Pearce  MRN: [id number]  Patient Class: IP- Inpatient  Admission Date: 7/13/2023  Attending Physician: Whit Washington MD   Primary Care Provider: Primary Doctor No         Patient information was obtained from past medical records, ER records and primary team.     Subjective:     Principal Problem:Septic shock    Chief Complaint:   Chief Complaint   Patient presents with    Paranoid        HPI: 34 y/o M with PMH of HIV, seizure disorder, and schizophrenia presented with acute psychosis. Per ED personnel and records, pt was seen and medically cleared by previous team in [facility] ED, however while waiting for transfer to psych facility he became hypotensive, bradycardic, and hypothermic. Following this event, an extensive workup was performed including a additional sepsis evaluation, CT head, CT chest, CT abd/pelvis, lumbar puncture. He received fluids and empiric antibiotics and antivirals. He was also put on a rewarming device. BP improved s/p fluid resuscitation. He is not hypercapnic. Labs are still pending, but pt currently being treated for sepsis of unknown origin. Pt is still under PEC.    Following the initial events that incited full medical workup, my initial evaluation of the patient was difficult 2/2 pt somnolence. Pt would open his eyes briefly, but only after a light sternal rub. He would attempt to answer questions, but then quickly fell back asleep. I was unable to obtain any further/useful information from the patient regarding his condition.      Past Medical History:   Diagnosis Date    Bipolar 1 disorder     PHU (generalized anxiety disorder)     Panic disorder        No past surgical history on file.    Review of patient's allergies indicates:  No Known Allergies    No current facility-administered medications on file prior to encounter.     Current Outpatient Medications on File Prior to Encounter    Medication Sig    buPROPion (WELLBUTRIN XL) 300 MG 24 hr tablet Take 300 mg by mouth once daily.    DESCOVY 200-25 mg Tab TAKE ONE TABLET BY MOUTH ONCE DAILY    doxepin (SINEQUAN) 25 MG capsule Take 1 capsule (25 mg total) by mouth every evening.    folic acid (FOLVITE) 1 MG tablet Take 1 tablet (1 mg total) by mouth once daily.    haloperidoL (HALDOL) 2 MG tablet Take 1 tablet (2 mg total) by mouth 3 (three) times daily.    haloperidol decanoate (HALDOL DECANOATE) 50 mg/mL injection Inject 1 mL (50 mg total) into the muscle every 30 days.    mirtazapine (REMERON SOL-TAB) 15 MG disintegrating tablet Take 1 tablet (15 mg total) by mouth nightly.    multivitamin (THERAGRAN) tablet Take 1 tablet by mouth once daily.    OLANZapine (ZYPREXA) 15 MG Tab Take 1 tablet (15 mg total) by mouth every evening.    pregabalin (LYRICA) 150 MG capsule Take 1 capsule (150 mg total) by mouth 2 (two) times daily.    PREZCOBIX 800-150 mg-mg Tab TAKE ONE TABLET BY MOUTH ONCE DAILY WITH FOOD. STORE AT ROOM TEMPERATURE.     Family History    None       Tobacco Use    Smoking status: Some Days     Types: Cigarettes    Smokeless tobacco: Never   Substance and Sexual Activity    Alcohol use: Yes     Comment: occasionally     Drug use: No    Sexual activity: Not on file     Review of Systems   Reason unable to perform ROS: pt somnolence.   Objective:     Vital Signs (Most Recent):  Temp: 97.5 °F (36.4 °C) (07/14/23 0719)  Pulse: 73 (07/14/23 0737)  Resp: 13 (07/14/23 0737)  BP: (!) 94/52 (07/14/23 0737)  SpO2: 96 % (07/14/23 0737) Vital Signs (24h Range):  Temp:  [95 °F (35 °C)-98.6 °F (37 °C)] 97.5 °F (36.4 °C)  Pulse:  [] 73  Resp:  [13-20] 13  SpO2:  [96 %-100 %] 96 %  BP: ()/(50-73) 94/52     Weight: 72.6 kg (160 lb)  Body mass index is 21.11 kg/m².     Physical Exam  Vitals reviewed.   Constitutional:       General: He is sleeping.      Appearance: He is normal weight.      Comments: Difficult to arouse    HENT:      Head: Normocephalic and atraumatic.      Mouth/Throat:      Mouth: Mucous membranes are moist.   Eyes:      Extraocular Movements: Extraocular movements intact.   Cardiovascular:      Rate and Rhythm: Normal rate and regular rhythm.   Pulmonary:      Effort: Pulmonary effort is normal. No respiratory distress.      Breath sounds: Normal breath sounds.   Abdominal:      General: There is no distension.      Palpations: Abdomen is soft.   Skin:     General: Skin is warm and dry.   Neurological:      Mental Status: He is disoriented.   Psychiatric:      Comments: Unable to assess during initial pt eval 2/2 somnolence              Significant Labs: All pertinent labs within the past 24 hours have been reviewed.    Significant Imaging: I have reviewed all pertinent imaging results/findings within the past 24 hours.    Assessment/Plan:     * Septic shock  - Sepsis of unknown origin  - Continue broad spectrum coverage with Vanc, Cefepime, Acyclovir  - Consulted MICU 2/2 hypotension in the ED, additional workup requested, results pending.  - continue fluid resuscitation        Psychosis  - PEC in place  - Consult inpatient psych  - Haldol PRN and Ativan PRN ordered for patient and staff safety.  - Acute psychiatric condition possibly 2/2 ongoing sepsis        HIV disease  -Unsure of current disease state/progression. Per psychiatry note in 3/2023 pt was taking descovy and prezcobix.  - last known CD4 count was 605 in 2018  - will obtain HIV panel and CD4 count  - ID consult if necessary    Seizure disorder  -Hx of seizure disorder  - unknown if pt currently taking medication        VTE Risk Mitigation (From admission, onward)         Ordered     heparin (porcine) injection 5,000 Units  Every 8 hours         07/14/23 0725     IP VTE HIGH RISK PATIENT  Once         07/14/23 0725     Place sequential compression device  Until discontinued         07/14/23 0725                           Pan Pan  MD  Department of Hospital Medicine  [facility]

## 2023-07-14 NOTE — HPI
Patient is a 33 y.o. male with HIV, syphilis, HSV, seizures, polysubstance use and psychiatric d/o presented to hospital on 7/14 for evaluation of bizarre behavior and homicidal ideation. At time of presentation, vitals were normal. Initial labs overall  Patient was PEC'd, awaiting inpatient psych placement. The patient was subsequently admitted to Hospital Medicine. ~ 05:30 the patient became hypotensive, bradycardic and hypothermic. Additionally he has become less responsive. He was given 2L IVF, however had persistent hypotension despite fluid resuscitation and was started on levo. LP was performed by the ED. Pt was started on empiric Flagyl/cefepime/acyclovir. CT head was negative for acute intracranial abnormalities. Critical Care consulted for depressed mental status and concern for airway.

## 2023-07-14 NOTE — PROGRESS NOTES
Pharmacokinetic Initial Assessment: IV Vancomycin    Assessment/Plan:    Initiate intravenous vancomycin with loading dose of 1750 mg once in the ED followed by a maintenance dose of vancomycin 1250mg IV every 12 hours  Desired empiric serum trough concentration is 15 to 20 mcg/mL  Draw vancomycin trough level 60 min prior to fourth dose on 4th at approximately 1530  Pharmacy will continue to follow and monitor vancomycin.      Please contact pharmacy at extension [phone] with any questions regarding this assessment.     Thank you for the consult,   Hilda Nicholas       Patient brief summary:  Flako Pearce is a 33 y.o. male initiated on antimicrobial therapy with IV Vancomycin for treatment of suspected sepsis    Drug Allergies:   Review of patient's allergies indicates:  No Known Allergies    Actual Body Weight:   72.6 kg    Renal Function:   Estimated Creatinine Clearance: 121 mL/min (based on SCr of 0.9 mg/dL).,     Dialysis Method (if applicable):  N/A    CBC (last 72 hours):  Recent Labs   Lab Result Units 07/13/23 1922 07/14/23  0440   WBC K/uL 8.59 5.50   Hemoglobin g/dL 15.1 11.9*   Hematocrit % 44.5 36.8*   Platelets K/uL 321 206   Gran % % 60.2 45.3   Lymph % % 29.0 40.0   Mono % % 8.7 11.6   Eosinophil % % 1.4 2.4   Basophil % % 0.5 0.5   Differential Method  Automated Automated       Metabolic Panel (last 72 hours):  Recent Labs   Lab Result Units 07/13/23 1919 07/13/23 1922 07/14/23  0440   Sodium mmol/L  --  139 137   Potassium mmol/L  --  3.9 3.4*   Chloride mmol/L  --  107 114*   CO2 mmol/L  --  20* 18*   Glucose mg/dL  --  71 89   Glucose, UA  Negative  --   --    BUN mg/dL  --  12 8   Creatinine mg/dL  --  1.2 0.9   Creatinine, Urine mg/dL 68.0  --   --    Albumin g/dL  --  4.7 3.1*   Total Bilirubin mg/dL  --  0.8 0.4   Alkaline Phosphatase U/L  --  86 59   AST U/L  --  22 14   ALT U/L  --  26 15       Drug levels (last 3 results):  No results for input(s): VANCOMYCINRA, VANCORANDOM,  VANCOMYCINPE, VANCOPEAK, VANCOMYCINTR, VANCOTROUGH in the last 72 hours.    Microbiologic Results:  Microbiology Results (last 7 days)       Procedure Component Value Units Date/Time    CSF culture and Gram Stain (Tube 2) [[id number]] Collected: 07/14/23 0449    Order Status: Sent Specimen: CSF (Spinal Fluid) from CSF Tap, Tube 2 Updated: 07/14/23 0628    AFB Culture & Smear (Tube 3) [[id number]] Collected: 07/14/23 0449    Order Status: Sent Specimen: CSF (Spinal Fluid) from CSF Tap, Tube 2 Updated: 07/14/23 0628    Cryptococcal antigen, CSF (Tube 3) [[id number]] Collected: 07/14/23 0449    Order Status: Sent Specimen: CSF (Spinal Fluid) from CSF Tap, Tube 2 Updated: 07/14/23 0627    CSF culture [[id number]] Collected: 07/14/23 0449    Order Status: Sent Specimen: CSF (Spinal Fluid) from CSF Tap, Tube 2 Updated: 07/14/23 0627    Fungus culture (Tube 3) [[id number]] Collected: 07/14/23 0449    Order Status: Sent Specimen: CSF (Spinal Fluid) from CSF Tap, Tube 2 Updated: 07/14/23 0626    Blood culture #2 **CANNOT BE ORDERED STAT** [[id number]] Collected: 07/14/23 0232    Order Status: Sent Specimen: Blood from Peripheral, Antecubital, Left Updated: 07/14/23 0244    Blood culture #1 **CANNOT BE ORDERED STAT** [[id number]] Collected: 07/14/23 0232    Order Status: Sent Specimen: Blood from Peripheral, Forearm, Right Updated: 07/14/23 0244

## 2023-07-14 NOTE — ED NOTES
Assumed pt care at this time. Pt remains in paper scrubs, resting in stretcher comfortably - with side rails up, locked, and in lowest position. Chest rise and fall noted; breathing equal, even, and unlabored. Sitter remains at bedside in direct visual contact, charting per protocol every 15 minutes. No equipment or belongings are in the patients room to prevent self harm or injury. Pt aware of plan of care. No acute distress noted and no needs expressed at this time. Will continue to assess periodically.

## 2023-07-14 NOTE — ED NOTES
Pt remains in paper scrubs, resting in stretcher comfortably - with side rails up, locked, and in lowest position. Chest rise and fall noted; breathing equal, even, and unlabored. Sitter remains at bedside in direct visual contact, charting per protocol every 15 minutes. No equipment or belongings are in the patients room to prevent self harm or injury. Pt aware of plan of care. No acute distress noted and no needs expressed at this time. Will continue to assess periodically.

## 2023-07-14 NOTE — SUBJECTIVE & OBJECTIVE
Past Medical History:   Diagnosis Date    Bipolar 1 disorder     PHU (generalized anxiety disorder)     Panic disorder        No past surgical history on file.    Review of patient's allergies indicates:  No Known Allergies    No current facility-administered medications on file prior to encounter.     Current Outpatient Medications on File Prior to Encounter   Medication Sig    buPROPion (WELLBUTRIN XL) 300 MG 24 hr tablet Take 300 mg by mouth once daily.    DESCOVY 200-25 mg Tab TAKE ONE TABLET BY MOUTH ONCE DAILY    doxepin (SINEQUAN) 25 MG capsule Take 1 capsule (25 mg total) by mouth every evening.    folic acid (FOLVITE) 1 MG tablet Take 1 tablet (1 mg total) by mouth once daily.    haloperidoL (HALDOL) 2 MG tablet Take 1 tablet (2 mg total) by mouth 3 (three) times daily.    haloperidol decanoate (HALDOL DECANOATE) 50 mg/mL injection Inject 1 mL (50 mg total) into the muscle every 30 days.    mirtazapine (REMERON SOL-TAB) 15 MG disintegrating tablet Take 1 tablet (15 mg total) by mouth nightly.    multivitamin (THERAGRAN) tablet Take 1 tablet by mouth once daily.    OLANZapine (ZYPREXA) 15 MG Tab Take 1 tablet (15 mg total) by mouth every evening.    pregabalin (LYRICA) 150 MG capsule Take 1 capsule (150 mg total) by mouth 2 (two) times daily.    PREZCOBIX 800-150 mg-mg Tab TAKE ONE TABLET BY MOUTH ONCE DAILY WITH FOOD. STORE AT ROOM TEMPERATURE.     Family History    None       Tobacco Use    Smoking status: Some Days     Types: Cigarettes    Smokeless tobacco: Never   Substance and Sexual Activity    Alcohol use: Yes     Comment: occasionally     Drug use: No    Sexual activity: Not on file     Review of Systems   Reason unable to perform ROS: pt somnolence.   Objective:     Vital Signs (Most Recent):  Temp: 97.5 °F (36.4 °C) (07/14/23 0719)  Pulse: 73 (07/14/23 0737)  Resp: 13 (07/14/23 0737)  BP: (!) 94/52 (07/14/23 0737)  SpO2: 96 % (07/14/23 0737) Vital Signs (24h Range):  Temp:  [95 °F (35 °C)-98.6 °F  (37 °C)] 97.5 °F (36.4 °C)  Pulse:  [] 73  Resp:  [13-20] 13  SpO2:  [96 %-100 %] 96 %  BP: ()/(50-73) 94/52     Weight: 72.6 kg (160 lb)  Body mass index is 21.11 kg/m².     Physical Exam  Vitals reviewed.   Constitutional:       General: He is sleeping.      Appearance: He is normal weight.      Comments: Difficult to arouse   HENT:      Head: Normocephalic and atraumatic.      Mouth/Throat:      Mouth: Mucous membranes are moist.   Eyes:      Extraocular Movements: Extraocular movements intact.   Cardiovascular:      Rate and Rhythm: Normal rate and regular rhythm.   Pulmonary:      Effort: Pulmonary effort is normal. No respiratory distress.      Breath sounds: Normal breath sounds.   Abdominal:      General: There is no distension.      Palpations: Abdomen is soft.   Skin:     General: Skin is warm and dry.   Neurological:      Mental Status: He is disoriented.   Psychiatric:      Comments: Unable to assess during initial pt eval 2/2 somnolence              Significant Labs: All pertinent labs within the past 24 hours have been reviewed.    Significant Imaging: I have reviewed all pertinent imaging results/findings within the past 24 hours.

## 2023-07-14 NOTE — PROCEDURES
EEG REPORT      Flako Pearce  [id number]  1990    DATE OF SERVICE: 7/14/2023         METHODOLOGY      Extended electroencephalographic recording is made while the patient is ambulatory and continuing normal daily activities.  Electrodes are placed according to the International 10-20 placement system and included T1 and T2 electrode placement.  Twenty four (24) channels of digital signal (sampling rate of 512/sec) was simultaneously recorded from the scalp including EKG and eye monitors.  Recording band pass was 0.1 to 100 hz and all data was stored digitally on the recorder.  The patient is instructed to press an event button when clinical symptoms occur and write the symptoms into a diary. Activation procedures which include photic stimulation, hyperventilation and instructing patients to perform simple task are done in selected patients.        The EEG is displayed on a monitor screen and can be reformatted into different montages for evaluation.  The entire recoding is submitted for computer assisted analysis to detect spike and electrographic seizure activity.  The entire recording is visually reviewed and the times identified by computer analysis as being spikes or seizures are reviewed again.  Compresses spectral analysis (CSA) is also performed on the activity recorded from each individual channel.  This is displayed as a power display of frequencies from 0 to 30 Hz over time.   The CSA analysis is done and displayed continuously.  This is reviewed for asymmetries in power between homologous areas of the scalp and for presence of changes in power which canbe seen when seizures occur.  Sections of suspected abnormalities on the CSA is then compared with the original EEG recording.  .     [employer] software was also utilized in the review of this study.  This software suite analyzes the EEG recording in multiple domains.  Coherence and rhythmicity is computed to identify EEG sections which may  contain organized seizures.  Each channel undergoes analysis to detect presence of spike and sharp waves which have special and morphological characteristic of epileptic activity.  The routine EEG recording is converted from spacial into frequency domain.  This is then displayed comparing homologous areas to identify areas of significant asymmetry.  Algorithm to identify non-cortically generated artifact is used to separate eye movement, EMG and other artifact from the EEG     Recording Times    A total of 00:29:14 hours of EEG was recorded.      EEG FINDINGS:  Background activity:   The background rhythm was characterized by alpha and anterior dominant beta activity with a 10Hz posterior dominant alpha rhythm at 30-70 microvolts.   Symmetry and continuity: the background was continuous and symmetric     Sleep:   Normal sleep transients including sleep spindles, K complexes, vertex waves were seen.    Activation procedures:   NA    Abnormal activity:   No epileptiform discharges, periodic discharges, lateralized rhythmic delta activity or electrographic seizures were seen.    IMPRESSION:   Normal EEG of light sleep and the waking state.      Chuck Almonte MD  Neurology-Epilepsy.  [facility].

## 2023-07-14 NOTE — ED NOTES
"The patient is recv'd lying in bed awake. He is attired in [facility] provided scrubs w/ fair grooming & hygiene. His affect is flat, appears drowsy as if under the influence of intoxicants or illicit's, mood is anxious. Demeanor is pleasant, cooperative. Patient has rambling speech, tangentiality, FOI. He states the reason for his presentation to the ED as, " same day eviction."  He presented to the ED w/ an extra large bag of belongings.He denies S/HI, A/VH. He initially answered "yes" to HI citing his landlord, Ms. Hebert, "if she's not already dead.;but I didn't kill her". He endorses Pentobarbital as his drug of choice although he also use other drugs. He denies "opiods:& if they find any somebody put it in there."  "

## 2023-07-14 NOTE — ASSESSMENT & PLAN NOTE
-Unsure of current disease state/progression. Per psychiatry note in 3/2023 pt was taking descovy and prezcobix.  - last known CD4 count was 605 in 2018  - will obtain HIV panel and CD4 count  - ID consult if necessary

## 2023-07-14 NOTE — ED NOTES
Pt belongings were collected and securely placed in the pt closet and safe with pt label placed on each belonging bag.

## 2023-07-15 PROBLEM — A53.0 POSITIVE SEROLOGICAL REACTION FOR SYPHILIS: Status: ACTIVE | Noted: 2023-07-15

## 2023-07-15 LAB
ALBUMIN SERPL BCP-MCNC: 3 G/DL (ref 3.5–5.2)
ALP SERPL-CCNC: 57 U/L (ref 55–135)
ALT SERPL W/O P-5'-P-CCNC: 13 U/L (ref 10–44)
ANION GAP SERPL CALC-SCNC: 7 MMOL/L (ref 8–16)
AST SERPL-CCNC: 10 U/L (ref 10–40)
BASOPHILS # BLD AUTO: 0.02 K/UL (ref 0–0.2)
BASOPHILS NFR BLD: 0.4 % (ref 0–1.9)
BILIRUB SERPL-MCNC: 0.3 MG/DL (ref 0.1–1)
BUN SERPL-MCNC: 5 MG/DL (ref 6–20)
CALCIUM SERPL-MCNC: 8.2 MG/DL (ref 8.7–10.5)
CHLORIDE SERPL-SCNC: 109 MMOL/L (ref 95–110)
CK SERPL-CCNC: 40 U/L (ref 20–200)
CO2 SERPL-SCNC: 23 MMOL/L (ref 23–29)
CREAT SERPL-MCNC: 0.9 MG/DL (ref 0.5–1.4)
DIFFERENTIAL METHOD: ABNORMAL
EOSINOPHIL # BLD AUTO: 0.2 K/UL (ref 0–0.5)
EOSINOPHIL NFR BLD: 2.7 % (ref 0–8)
ERYTHROCYTE [DISTWIDTH] IN BLOOD BY AUTOMATED COUNT: 13.6 % (ref 11.5–14.5)
EST. GFR  (NO RACE VARIABLE): >60 ML/MIN/1.73 M^2
GLUCOSE SERPL-MCNC: 114 MG/DL (ref 70–110)
HCT VFR BLD AUTO: 35.6 % (ref 40–54)
HGB BLD-MCNC: 12 G/DL (ref 14–18)
HSV1, PCR, CSF: NEGATIVE
HSV2, PCR, CSF: NEGATIVE
IMM GRANULOCYTES # BLD AUTO: 0.01 K/UL (ref 0–0.04)
IMM GRANULOCYTES NFR BLD AUTO: 0.2 % (ref 0–0.5)
LYMPHOCYTES # BLD AUTO: 2 K/UL (ref 1–4.8)
LYMPHOCYTES NFR BLD: 35.9 % (ref 18–48)
MAGNESIUM SERPL-MCNC: 1.8 MG/DL (ref 1.6–2.6)
MCH RBC QN AUTO: 30.4 PG (ref 27–31)
MCHC RBC AUTO-ENTMCNC: 33.7 G/DL (ref 32–36)
MCV RBC AUTO: 90 FL (ref 82–98)
MONOCYTES # BLD AUTO: 0.6 K/UL (ref 0.3–1)
MONOCYTES NFR BLD: 10.2 % (ref 4–15)
NEUTROPHILS # BLD AUTO: 2.9 K/UL (ref 1.8–7.7)
NEUTROPHILS NFR BLD: 50.6 % (ref 38–73)
NRBC BLD-RTO: 0 /100 WBC
PHOSPHATE SERPL-MCNC: 3.3 MG/DL (ref 2.7–4.5)
PLATELET # BLD AUTO: 214 K/UL (ref 150–450)
PMV BLD AUTO: 10.3 FL (ref 9.2–12.9)
POCT GLUCOSE: 121 MG/DL (ref 70–110)
POTASSIUM SERPL-SCNC: 3.7 MMOL/L (ref 3.5–5.1)
PROT SERPL-MCNC: 5.3 G/DL (ref 6–8.4)
RBC # BLD AUTO: 3.95 M/UL (ref 4.6–6.2)
SODIUM SERPL-SCNC: 139 MMOL/L (ref 136–145)
VANCOMYCIN TROUGH SERPL-MCNC: 10.4 UG/ML (ref 10–22)
WBC # BLD AUTO: 5.66 K/UL (ref 3.9–12.7)

## 2023-07-15 PROCEDURE — 63600175 PHARM REV CODE 636 W HCPCS

## 2023-07-15 PROCEDURE — 80053 COMPREHEN METABOLIC PANEL: CPT

## 2023-07-15 PROCEDURE — 99233 PR SUBSEQUENT HOSPITAL CARE,LEVL III: ICD-10-PCS | Mod: ,,, | Performed by: HOSPITALIST

## 2023-07-15 PROCEDURE — 80202 ASSAY OF VANCOMYCIN: CPT | Performed by: HOSPITALIST

## 2023-07-15 PROCEDURE — 99232 SBSQ HOSP IP/OBS MODERATE 35: CPT | Mod: 95,AF,HB, | Performed by: STUDENT IN AN ORGANIZED HEALTH CARE EDUCATION/TRAINING PROGRAM

## 2023-07-15 PROCEDURE — 84100 ASSAY OF PHOSPHORUS: CPT

## 2023-07-15 PROCEDURE — 99232 PR SUBSEQUENT HOSPITAL CARE,LEVL II: ICD-10-PCS | Mod: 95,AF,HB, | Performed by: STUDENT IN AN ORGANIZED HEALTH CARE EDUCATION/TRAINING PROGRAM

## 2023-07-15 PROCEDURE — 82550 ASSAY OF CK (CPK): CPT

## 2023-07-15 PROCEDURE — 36415 COLL VENOUS BLD VENIPUNCTURE: CPT | Performed by: HOSPITALIST

## 2023-07-15 PROCEDURE — 63600175 PHARM REV CODE 636 W HCPCS: Performed by: HOSPITALIST

## 2023-07-15 PROCEDURE — 25000003 PHARM REV CODE 250: Performed by: HOSPITALIST

## 2023-07-15 PROCEDURE — 20600001 HC STEP DOWN PRIVATE ROOM

## 2023-07-15 PROCEDURE — 99233 SBSQ HOSP IP/OBS HIGH 50: CPT | Mod: ,,, | Performed by: HOSPITALIST

## 2023-07-15 PROCEDURE — 83735 ASSAY OF MAGNESIUM: CPT

## 2023-07-15 PROCEDURE — 25000003 PHARM REV CODE 250

## 2023-07-15 PROCEDURE — 99223 PR INITIAL HOSPITAL CARE,LEVL III: ICD-10-PCS | Mod: ,,, | Performed by: INTERNAL MEDICINE

## 2023-07-15 PROCEDURE — 99223 1ST HOSP IP/OBS HIGH 75: CPT | Mod: ,,, | Performed by: INTERNAL MEDICINE

## 2023-07-15 PROCEDURE — 85025 COMPLETE CBC W/AUTO DIFF WBC: CPT

## 2023-07-15 PROCEDURE — 36415 COLL VENOUS BLD VENIPUNCTURE: CPT

## 2023-07-15 RX ORDER — DIAZEPAM 5 MG/1
5 TABLET ORAL EVERY 8 HOURS
Status: DISCONTINUED | OUTPATIENT
Start: 2023-07-15 | End: 2023-07-16

## 2023-07-15 RX ORDER — HALOPERIDOL 2 MG/1
2 TABLET ORAL 3 TIMES DAILY
Status: DISCONTINUED | OUTPATIENT
Start: 2023-07-15 | End: 2023-07-19 | Stop reason: HOSPADM

## 2023-07-15 RX ADMIN — ACYCLOVIR SODIUM 800 MG: 1000 INJECTION, SOLUTION INTRAVENOUS at 11:07

## 2023-07-15 RX ADMIN — VANCOMYCIN HYDROCHLORIDE 1250 MG: 1.25 INJECTION, POWDER, LYOPHILIZED, FOR SOLUTION INTRAVENOUS at 04:07

## 2023-07-15 RX ADMIN — VANCOMYCIN HYDROCHLORIDE 1250 MG: 1.25 INJECTION, POWDER, LYOPHILIZED, FOR SOLUTION INTRAVENOUS at 05:07

## 2023-07-15 RX ADMIN — HALOPERIDOL 2 MG: 2 TABLET ORAL at 10:07

## 2023-07-15 RX ADMIN — METRONIDAZOLE 500 MG: 500 INJECTION, SOLUTION INTRAVENOUS at 12:07

## 2023-07-15 RX ADMIN — CEFEPIME 2 G: 2 INJECTION, POWDER, FOR SOLUTION INTRAVENOUS at 07:07

## 2023-07-15 RX ADMIN — HALOPERIDOL 2 MG: 2 TABLET ORAL at 04:07

## 2023-07-15 RX ADMIN — CEFEPIME 2 G: 2 INJECTION, POWDER, FOR SOLUTION INTRAVENOUS at 03:07

## 2023-07-15 RX ADMIN — CEFEPIME 2 G: 2 INJECTION, POWDER, FOR SOLUTION INTRAVENOUS at 11:07

## 2023-07-15 RX ADMIN — ACYCLOVIR SODIUM 800 MG: 1000 INJECTION, SOLUTION INTRAVENOUS at 06:07

## 2023-07-15 RX ADMIN — VANCOMYCIN HYDROCHLORIDE 500 MG: 500 INJECTION, POWDER, LYOPHILIZED, FOR SOLUTION INTRAVENOUS at 05:07

## 2023-07-15 RX ADMIN — METRONIDAZOLE 500 MG: 500 INJECTION, SOLUTION INTRAVENOUS at 08:07

## 2023-07-15 RX ADMIN — DIAZEPAM 5 MG: 5 TABLET ORAL at 10:07

## 2023-07-15 RX ADMIN — METRONIDAZOLE 500 MG: 500 INJECTION, SOLUTION INTRAVENOUS at 05:07

## 2023-07-15 RX ADMIN — ACYCLOVIR SODIUM 800 MG: 1000 INJECTION, SOLUTION INTRAVENOUS at 03:07

## 2023-07-15 RX ADMIN — HALOPERIDOL 2 MG: 2 TABLET ORAL at 12:07

## 2023-07-15 RX ADMIN — DIAZEPAM 5 MG: 5 TABLET ORAL at 02:07

## 2023-07-15 NOTE — NURSING
Patient is AAO*4  . Had Bowel  movement . Patient has  been acceptance to care provided .Sitter  present . Safety precaution maintained. Will continue monitoring.

## 2023-07-15 NOTE — PLAN OF CARE
Collateral- Pt's Mother    Pt's mother was contacted via phone call to corroborate pt's history. Pt's mom reports pt having over 15 years of substance abuse disorder. Pt has done all type of hard drugs. Multiple attempts at rehab. She denies pt having any seizure disorder during his childhood and early adulthood. Pt has have multiple episodes of acute psychosis with auditory hallucinations, most recent episodes were on December and March. Pt recently posted a 17 min long video on social media mentioning that some of his friends are driving to [place] (were family lives) to kill his family and that he was going to turn the gas of his apartment on and light candles on. Pt's mother reports attempting to help him on multiple occasion, but right now if the pt is discharged and does not have a place to go, he can not go back to her house. She was able to confirm pt's employment status as a  at [employer].       Jagdish Arnold MD  Department of Hospital Medicine   [facility] ([place])

## 2023-07-15 NOTE — CONSULTS
[facility] ([place] [id number])  Infectious Disease  Consult Note    Patient Name: Flako Pearce  MRN: [id number]  Admission Date: 7/13/2023  Hospital Length of Stay: 1 days  Attending Physician: Whit Washington MD  Primary Care Provider: Primary Doctor No     Isolation Status: No active isolations    Patient information was obtained from past medical records and ER records.      Inpatient consult to Infectious Diseases  Consult performed by: Keri Light MD  Consult ordered by: Jagdish Arnold MD        Assessment/Plan:     ID  HIV disease  I have reviewed hospital notes from   service and other specialty providers. I have also reviewed CBC, CMP/BMP,  cultures and imaging with my interpretation as documented.     Appears to be well controlled with virologic suppression and adequate immune response as evidenced by his CD4 cell count of over 1000.  Seems to be tolerating outpatient therapy, reportedly with Prezcobix and Descovy.   If no contraindication then would resume the patient's anti-retroviral therapy.   Patient can follow up with his outpatient provider at [facility] for ongoing management.   Discussed management plan with the staff and/or members from  service.      Positive serological reaction for syphilis  Patient with prior syphilis infection.  Appears to have been treated in the past.  RPR titer appears to be decreasing going from 1:8 to 1:4.  This is consistent with the previously treated syphilis infection.  The diagnostic lumbar puncture however, does not appear to indicate any evidence of syphilis infection at this time in the setting of a normal cell count.  VDRL is pending.  · Patient appears to have longstanding auditory hallucinations.  Unclear if this is related to his psychiatric illness or syphilis infection.  · I do not favor treatment of syphilis at this time as it appears his RPR titer is decreasing.  · Recommend getting records from [facility]  to assess if patient was truly treated after his diagnosis in December.  If records show that his most recent titer of 1-4 is a 4 fold increase from his baseline then may need to consider retreatment for late latent syphilis versus neurosyphilis.  · If VDRL is negative then no indication for intravenous penicillin therapy.      Thank you for your consult. I will follow-up with patient. Please contact us if you have any additional questions.    Keri Light MD  Infectious Disease  [facility] - Intensive Care ([place] [id number])    Subjective:     Principal Problem: Severe sepsis    HPI: A 33-year-old man with HIV, on HAART consisting of Prezcobix plus Descovy, past syphilis infection, seizure disorder, schizophrenia, and substance use who originally presented to [facility] ED with an acute psychosis.  While awaiting transfer to a psychiatric facility he became hypothermic, hypotensive, and bradycardic prompting admission to hospital medicine for further management.  He underwent diagnostic lumbar puncture as well as empiric treatment with antivirals plus antibiotics.    Mr. Pearce reports he has been previously treated for syphilis with penicillin injections.  He reports outpatient laboratory workup performed through [facility] has been good.          Past Medical History:   Diagnosis Date    Bipolar 1 disorder     PHU (generalized anxiety disorder)     Panic disorder        No past surgical history on file.    Review of patient's allergies indicates:  No Known Allergies    Medications:  Medications Prior to Admission   Medication Sig    haloperidol decanoate (HALDOL DECANOATE) 100 mg/mL injection Inject 50 mg into the muscle every 30 days.    pregabalin (LYRICA) 150 MG capsule Take 1 capsule (150 mg total) by mouth 2 (two) times daily.    propranoloL (INDERAL) 80 MG tablet Take 80 mg by mouth 2 (two) times a day.    SYMTUZA [phone] mg Tab Take 1 tablet by mouth once daily.    divalproex 500 MG Tb24  Take 500 mg by mouth once daily.     Antibiotics (From admission, onward)      Start     Stop Route Frequency Ordered    07/14/23 1630  vancomycin 1,250 mg in dextrose 5 % (D5W) 250 mL IVPB (Vial-Mate)         -- IV Every 12 hours (non-standard times) 07/14/23 0754    07/14/23 1100  ceFEPIme (MAXIPIME) 2 g in dextrose 5 % in water (D5W) 5 % 100 mL IVPB (MB+)         -- IV Every 8 hours (non-standard times) 07/14/23 0823    07/14/23 0830  vancomycin - pharmacy to dose  (vancomycin IVPB (PEDS and ADULTS))        See [facility] for [facility].    -- IV pharmacy to manage frequency 07/14/23 0730    07/14/23 0400  metronidazole IVPB 500 mg         -- IV Every 8 hours (non-standard times) 07/14/23 0247          Antifungals (From admission, onward)      None          Antivirals (From admission, onward)          Stop Route Frequency     acyclovir (ZOVIRAX) injection         -- IV Every 8 hours (non-standard times)             Immunization History   Administered Date(s) Administered    COVID-19, MRNA, LN-S, PF (MODERNA FULL 0.5 ML DOSE) 05/18/2021       Family History    None       Social History     Socioeconomic History    Marital status: Single   Tobacco Use    Smoking status: Some Days     Types: Cigarettes    Smokeless tobacco: Never   Substance and Sexual Activity    Alcohol use: Yes     Comment: occasionally     Drug use: No     Review of Systems   Constitutional:  Negative for chills, fatigue and fever.   HENT:  Negative for ear pain, mouth sores, nosebleeds, postnasal drip, rhinorrhea, sinus pressure, sore throat, tinnitus, trouble swallowing and voice change.    Eyes:  Negative for photophobia, pain, redness and visual disturbance.   Respiratory:  Negative for apnea, cough, chest tightness, shortness of breath and wheezing.    Cardiovascular:  Negative for chest pain, palpitations and leg swelling.   Gastrointestinal:  Negative for abdominal pain, blood in stool, constipation, diarrhea, nausea  and vomiting.   Endocrine: Negative for cold intolerance, heat intolerance, polydipsia and polyuria.   Genitourinary:  Negative for decreased urine volume, difficulty urinating, dysuria, flank pain, frequency, genital sores, hematuria, penile discharge, penile pain, penile swelling, scrotal swelling, testicular pain and urgency.   Musculoskeletal:  Negative for arthralgias, back pain, joint swelling, myalgias and neck pain.   Skin:  Negative for color change and rash.   Allergic/Immunologic: Negative for environmental allergies and food allergies.   Neurological:  Negative for dizziness, seizures, syncope, weakness, light-headedness, numbness and headaches.   Hematological:  Negative for adenopathy. Does not bruise/bleed easily.   Psychiatric/Behavioral:  Negative for agitation, confusion, decreased concentration, hallucinations, self-injury, sleep disturbance and suicidal ideas. The patient is not nervous/anxious.    Objective:     Vital Signs (Most Recent):  Temp: 97.9 °F (36.6 °C) (07/15/23 1202)  Pulse: 72 (07/15/23 1202)  Resp: 20 (07/15/23 1202)  BP: (!) 91/50 (07/15/23 1202)  SpO2: 97 % (07/15/23 1202) Vital Signs (24h Range):  Temp:  [97.5 °F (36.4 °C)-98.1 °F (36.7 °C)] 97.9 °F (36.6 °C)  Pulse:  [60-74] 72  Resp:  [16-20] 20  SpO2:  [97 %-100 %] 97 %  BP: ()/(50-59) 91/50     Weight: 86.3 kg (190 lb 4.1 oz)  Body mass index is 25.1 kg/m².    Estimated Creatinine Clearance: 133.2 mL/min (based on SCr of 0.9 mg/dL).     Physical Exam  Vitals and nursing note reviewed.   Constitutional:       General: He is sleeping.      Appearance: He is well-developed.   HENT:      Head: Normocephalic and atraumatic.   Eyes:      General: No scleral icterus.        Right eye: No discharge.         Left eye: No discharge.      Conjunctiva/sclera: Conjunctivae normal.   Pulmonary:      Effort: Pulmonary effort is normal.   Musculoskeletal:         General: Normal range of motion.   Skin:     General: Skin is warm and  dry.   Neurological:      Mental Status: He is oriented to person, place, and time and easily aroused.   Psychiatric:         Behavior: Behavior normal.         Thought Content: Thought content normal.         Judgment: Judgment normal.        Significant Labs: Blood Culture:   Recent Labs   Lab 07/14/23 0232   LABBLOO No Growth to date  No Growth to date  No Growth to date  No Growth to date     BMP:   Recent Labs   Lab 07/15/23  0437   *      K 3.7      CO2 23   BUN 5*   CREATININE 0.9   CALCIUM 8.2*   MG 1.8     CBC:   Recent Labs   Lab 07/13/23  1922 07/14/23  0223 07/14/23  0229 07/14/23  0440 07/15/23  0437   WBC 8.59  --   --  5.50 5.66   HGB 15.1  --   --  11.9* 12.0*   HCT 44.5   < > 45 36.8* 35.6*     --   --  206 214    < > = values in this interval not displayed.     CSF:   Recent Labs   Lab 07/14/23 0449   CSFCULTURE No Growth to date       Significant Imaging: I have reviewed all pertinent imaging results/findings within the past 24 hours.

## 2023-07-15 NOTE — PROGRESS NOTES
"CONSULTATION LIAISON PSYCHIATRY PROGRESS NOTE    Patient Name: Flako Pearce  MRN: [id number]  Patient Class: IP- Inpatient  Admission Date: 7/13/2023  Attending Physician: Whit Washington MD      SUBJECTIVE:   Flako Pearce is a 33 y.o. male with past psychiatric history of bipolar I disorder, SIPD, amphetamine use disorder, sedative, hypnotic, or anxiolytic (benzodiazepine) use disorder, and anxiety & past pertinent medical history of seizures, latent syphyllis treated on 07/2022, HIV on descovy and prezcobix, presents to the ED/admitted to the hospital for Severe sepsis. UDS+ amphetamines, benzodiazepines on admission)    Psychiatry consulted for PEC for grave disability / psychosis    No overnight events. Patient did not require any PRNs for agitation in past 24 hours. During interview this morning patient initially appeared fatigued but became increasingly alert over the course of the interview. During interview asks if I have called his parents recently. When asked the cause of his concern, patient reported that he "thought I heard them dying in my head." Reports auditory hallucinations as a chronic problem. States that he often experiences CAH, but that he does his best to not follow them. Reports most effective outpatient medication as haloperidol. States the he was previously receiving haloperidol decanoate COYLE, from his psychiatrist at [facility]. Endorses depressed mood, but denies SI/HI.           OBJECTIVE:    Mental Status Exam:  General Appearance: appears stated age, dressed in hospital garb, poorly groomed  Behavior: cooperative, poor eye contact  Involuntary Movements and Motor Activity: no abnormal involuntary movements noted  Gait and Station: unable to assess - patient lying down or seated  Speech and Language: repeats words and phrases, no word finding difficulties are noted, able to repeat "no ifs and or buts", slowed, soft  Mood: "Sad"  Affect: flat  Thought Process and Associations: "  Disorganized but improving  Thought Content and Perceptions:: no suicidal ideation, no homicidal ideation, + auditory hallucinations, + paranoid ideation  Sensorium and Orientation: grossly intact  Recent and Remote Memory: grossly intact  Attention and Concentration: grossly intact  Fund of Knowledge: grossly intact, vocabulary appropriate  Insight: limited/partial awareness of illness  Judgment: adequate    CAM ICU positive? no      ASSESSMENT & RECOMMENDATIONS     Psychosis NOS (suspect SIPD)  PSYCH MEDICATIONS  Continue home Haldol 2 mg TID  PRN Haldol 5 mg PO/IM and Ativan 2 mg PO/IM q6h for non-redirectable agitation  Monitor QTc (QTc 7/14/23: 421 ms)     Sedative, hypnotic, or anxiolytic (benzodiazepine) use disorder  PSYCH MEDICATIONS  Would recommend continuing benzodiazepine taper per primary team  Monitor for s/sx of benzodiazepine withdrawal and utilize Ativan PRN for breakthrough symptoms per CIWA protocol  Ativan 2mg PO/IM q6hr PRN for any two of the following: SBP>160, DBP>100, HR>100, tremor, or diaphoresis       OTHER PERTINENT DIAGNOSIS  Amphetamine use disorder  Polysubstance abuse (benzos, stimulants, Ketamine, pentobarbital)     RISK ASSESSMENT  Continue PEC/CEC for grave disability & NEEDS 1:1 sitter     FOLLOW UP  Will follow up while in house     DISPOSITION - once medically cleared:   Seek involuntary inpatient psychiatric admission for stabilization of acute psychiatric symptoms and a safe disposition plan is enacted. The patient &/or their family was informed that the patient will be transferred to an inpatient unit per ED/primary placement team.        Please contact ON CALL psychiatry service (24/7) for any acute issues that may arise.    Dr. Km Magaña  CL Psychiatry, PGY-II  [facility]  7/15/2023 1:11  PM        --------------------------------------------------------------------------------------------------------------------------------------------------------------------------------------------------------------------------------------    CONTINUED OBJECTIVE clinical data & findings reviewed and noted for above decision making    Current Medications:   Scheduled Meds:    acyclovir  10 mg/kg (Ideal) Intravenous Q8H    ceFEPime (MAXIPIME) IVPB  2 g Intravenous Q8H    diazePAM  5 mg Oral Q8H    haloperidoL  2 mg Oral TID    heparin (porcine)  5,000 Units Subcutaneous Q8H    metronidazole  500 mg Intravenous Q8H    vancomycin (VANCOCIN) IV (PEDS and ADULTS)  1,250 mg Intravenous Q12H     PRN Meds: acetaminophen, dextrose 10%, dextrose 10%, dextrose, dextrose, glucagon (human recombinant), haloperidol lactate, lorazepam, naloxone, ondansetron, sodium chloride 0.9%, Pharmacy to dose Vancomycin consult **AND** vancomycin - pharmacy to dose    Allergies:   Review of patient's allergies indicates:  No Known Allergies    Vitals  Vitals:    07/15/23 1202   BP: (!) 91/50   Pulse: 72   Resp: 20   Temp: 97.9 °F (36.6 °C)       Labs/Imaging/Studies:  Recent Results (from the past 24 hour(s))   CBC Auto Differential    Collection Time: 07/15/23  4:37 AM   Result Value Ref Range    WBC 5.66 3.90 - 12.70 K/uL    RBC 3.95 (L) 4.60 - 6.20 M/uL    Hemoglobin 12.0 (L) 14.0 - 18.0 g/dL    Hematocrit 35.6 (L) 40.0 - 54.0 %    MCV 90 82 - 98 fL    MCH 30.4 27.0 - 31.0 pg    MCHC 33.7 32.0 - 36.0 g/dL    RDW 13.6 11.5 - 14.5 %    Platelets 214 150 - 450 K/uL    MPV 10.3 9.2 - 12.9 fL    Immature Granulocytes 0.2 0.0 - 0.5 %    Gran # (ANC) 2.9 1.8 - 7.7 K/uL    Immature Grans (Abs) 0.01 0.00 - 0.04 K/uL    Lymph # 2.0 1.0 - 4.8 K/uL    Mono # 0.6 0.3 - 1.0 K/uL    Eos # 0.2 0.0 - 0.5 K/uL    Baso # 0.02 0.00 - 0.20 K/uL    nRBC 0 0 /100 WBC    Gran % 50.6 38.0 - 73.0 %    Lymph % 35.9 18.0 - 48.0 %    Mono % 10.2 4.0 - 15.0 %     Eosinophil % 2.7 0.0 - 8.0 %    Basophil % 0.4 0.0 - 1.9 %    Differential Method Automated    Comprehensive Metabolic Panel    Collection Time: 07/15/23  4:37 AM   Result Value Ref Range    Sodium 139 136 - 145 mmol/L    Potassium 3.7 3.5 - 5.1 mmol/L    Chloride 109 95 - 110 mmol/L    CO2 23 23 - 29 mmol/L    Glucose 114 (H) 70 - 110 mg/dL    BUN 5 (L) 6 - 20 mg/dL    Creatinine 0.9 0.5 - 1.4 mg/dL    Calcium 8.2 (L) 8.7 - 10.5 mg/dL    Total Protein 5.3 (L) 6.0 - 8.4 g/dL    Albumin 3.0 (L) 3.5 - 5.2 g/dL    Total Bilirubin 0.3 0.1 - 1.0 mg/dL    Alkaline Phosphatase 57 55 - 135 U/L    AST 10 10 - 40 U/L    ALT 13 10 - 44 U/L    eGFR >60.0 >60 mL/min/1.73 m^2    Anion Gap 7 (L) 8 - 16 mmol/L   Magnesium    Collection Time: 07/15/23  4:37 AM   Result Value Ref Range    Magnesium 1.8 1.6 - 2.6 mg/dL   Phosphorus    Collection Time: 07/15/23  4:37 AM   Result Value Ref Range    Phosphorus 3.3 2.7 - 4.5 mg/dL   CK    Collection Time: 07/15/23  4:37 AM   Result Value Ref Range    CPK 40 20 - 200 U/L   POCT glucose    Collection Time: 07/15/23  7:29 AM   Result Value Ref Range    POCT Glucose 121 (H) 70 - 110 mg/dL     Imaging Results              CT Chest Abdomen Pelvis With Contrast (xpd) (Final result)  Result time 07/14/23 10:59:18   Procedure changed from CT Abdomen Pelvis With Contrast     Final result by Gigi Underwood MD (07/14/23 10:59:18)                   Impression:      Subcentimeter left lobe hepatic cyst.    Mild hepatomegaly.    Trace ascites.      Electronically signed by: Gigi Underwood MD  Date:    07/14/2023  Time:    10:59               Narrative:    EXAMINATION:  CT CHEST ABDOMEN PELVIS WITH CONTRAST (XPD)    CLINICAL HISTORY:  Sepsis;    TECHNIQUE:  Low dose axial images, sagittal and coronal reformations were obtained from the thoracic inlet to the pubic symphysis following the IV administration of 100 mL of Omnipaque 350.  No oral contrast was  administered.    COMPARISON:  None    FINDINGS:  Soft tissue structures at the base of the neck appear unremarkable.  The heart is not enlarged.  The thoracic aorta is normal in caliber without evidence for aneurysmal dilatation.  No hilar or mediastinal adenopathy is identified.  No axillary adenopathy is identified.  There is some respiratory motion.  There are mild dependent atelectatic changes within the lung bases.  No lung suspicious lung nodules are evident.  There is no evidence for pneumothorax or pleural effusions.  Bony structures appear intact.  There is no evidence for acute fracture or bone destruction.    There is a 9 mm cyst within the left lobe of the liver.  The liver appears mildly enlarged.  No other focal liver lesions are identified.  The gallbladder is present and appears grossly unremarkable.  There is no evidence for intrahepatic or extrahepatic biliary dilatation.  The portal venous system is patent.  The spleen, stomach, and pancreas appear unremarkable.  The adrenal glands are not enlarged.  The kidneys are normal in size, position, and contour and are noted to concentrate contrast symmetrically.  The abdominal aorta tapers normally without aneurysmal dilatation.  No para-aortic lymphadenopathy is identified.  The appendix is not confidently identified, however there are no CT findings to suggest appendicitis.  No dilated loops of bowel are evident.  The urinary bladder appears unremarkable.  There is no evidence for pelvic or inguinal lymphadenopathy.  There is a trace amount of ascites within the pelvis and at the caudal tip of the liver.                                       CT Head With Contrast (Final result)  Result time 07/14/23 04:47:23      Final result by Js Garrido MD (07/14/23 04:47:23)                   Impression:      The ventricular system and sulci appear prominent for age, this may relate to early involutional change, clinical and historical correlation is  needed.    There is no additional evidence for acute intracranial process.    Empty sella configuration.    Electronically signed by resident: Martha Hyde  Date:    07/14/2023  Time:    04:16    Electronically signed by: Js Garrido  Date:    07/14/2023  Time:    04:47               Narrative:    EXAMINATION:  CT HEAD WITH CONTRAST    CLINICAL HISTORY:  Meningitis/CNS infection suspected;Mental status change, unknown cause;    TECHNIQUE:  Low dose axial CT images obtained throughout the head before and after the IV administration of 100 mL of Omnipaque 350.  Sagittal and coronal reconstructions were performed.    COMPARISON:  None.    FINDINGS:  Intracranial compartment:    Prominence of the ventricles and sulci out of proportion for patient's age may relate to early involutional change, clinical and historical correlation is needed no extra-axial blood or fluid collections.    The brain parenchyma otherwise appears within normal limits normal.  No parenchymal mass, hemorrhage, edema or major vascular distribution infarct.  No abnormal enhancement.    Empty sella configuration.    Skull/extracranial contents (limited evaluation):    No fracture. Mastoid air cells and paranasal sinuses are essentially clear.                                       X-Ray Chest 1 View (Final result)  Result time 07/14/23 04:28:59      Final result by Js Garrido MD (07/14/23 04:28:59)                   Impression:      Mild diminished depth of inspiration and mild atelectatic change without evidence for superimposed acute intrathoracic process.      Electronically signed by: Js Garrido  Date:    07/14/2023  Time:    04:28               Narrative:    EXAMINATION:  XR CHEST 1 VIEW    CLINICAL HISTORY:  Hypoxemia    TECHNIQUE:  Single frontal view of the chest was performed.    COMPARISON:  None    FINDINGS:  Single chest view is submitted.  There is diminished depth of inspiration and mild rotation, when accounting for  these factors the cardiomediastinal silhouette appears appropriate.    Mild accentuation of pulmonary bronchovascular markings consistent with diminished depth of inspiration noted.  Mild atelectatic change noted.  There is no evidence for superimposed confluent infiltrate or consolidation, significant pleural effusion or pneumothorax.    The osseous structures appear intact.

## 2023-07-15 NOTE — NURSING
Pt alert, Oriented x 4, calm, cooperative, and following commands appropriate. Vitals stable. No acute events overnight

## 2023-07-15 NOTE — SUBJECTIVE & OBJECTIVE
Past Medical History:   Diagnosis Date    Bipolar 1 disorder     PHU (generalized anxiety disorder)     Panic disorder        No past surgical history on file.    Review of patient's allergies indicates:  No Known Allergies    Medications:  Medications Prior to Admission   Medication Sig    haloperidol decanoate (HALDOL DECANOATE) 100 mg/mL injection Inject 50 mg into the muscle every 30 days.    pregabalin (LYRICA) 150 MG capsule Take 1 capsule (150 mg total) by mouth 2 (two) times daily.    propranoloL (INDERAL) 80 MG tablet Take 80 mg by mouth 2 (two) times a day.    SYMTUZA 253-150-277-10 mg Tab Take 1 tablet by mouth once daily.    divalproex 500 MG Tb24 Take 500 mg by mouth once daily.     Antibiotics (From admission, onward)      Start     Stop Route Frequency Ordered    07/14/23 1630  vancomycin 1,250 mg in dextrose 5 % (D5W) 250 mL IVPB (Vial-Mate)         -- IV Every 12 hours (non-standard times) 07/14/23 0754    07/14/23 1100  ceFEPIme (MAXIPIME) 2 g in dextrose 5 % in water (D5W) 5 % 100 mL IVPB (MB+)         -- IV Every 8 hours (non-standard times) 07/14/23 0823    07/14/23 0830  vancomycin - pharmacy to dose  (vancomycin IVPB (PEDS and ADULTS))        See Hyperspace for full Linked Orders Report.    -- IV pharmacy to manage frequency 07/14/23 0730    07/14/23 0400  metronidazole IVPB 500 mg         -- IV Every 8 hours (non-standard times) 07/14/23 0247          Antifungals (From admission, onward)      None          Antivirals (From admission, onward)          Stop Route Frequency     acyclovir (ZOVIRAX) injection         -- IV Every 8 hours (non-standard times)             Immunization History   Administered Date(s) Administered    COVID-19, MRNA, LN-S, PF (MODERNA FULL 0.5 ML DOSE) 05/18/2021       Family History    None       Social History     Socioeconomic History    Marital status: Single   Tobacco Use    Smoking status: Some Days     Types: Cigarettes    Smokeless tobacco: Never   Substance and  Sexual Activity    Alcohol use: Yes     Comment: occasionally     Drug use: No     Review of Systems   Constitutional:  Negative for chills, fatigue and fever.   HENT:  Negative for ear pain, mouth sores, nosebleeds, postnasal drip, rhinorrhea, sinus pressure, sore throat, tinnitus, trouble swallowing and voice change.    Eyes:  Negative for photophobia, pain, redness and visual disturbance.   Respiratory:  Negative for apnea, cough, chest tightness, shortness of breath and wheezing.    Cardiovascular:  Negative for chest pain, palpitations and leg swelling.   Gastrointestinal:  Negative for abdominal pain, blood in stool, constipation, diarrhea, nausea and vomiting.   Endocrine: Negative for cold intolerance, heat intolerance, polydipsia and polyuria.   Genitourinary:  Negative for decreased urine volume, difficulty urinating, dysuria, flank pain, frequency, genital sores, hematuria, penile discharge, penile pain, penile swelling, scrotal swelling, testicular pain and urgency.   Musculoskeletal:  Negative for arthralgias, back pain, joint swelling, myalgias and neck pain.   Skin:  Negative for color change and rash.   Allergic/Immunologic: Negative for environmental allergies and food allergies.   Neurological:  Negative for dizziness, seizures, syncope, weakness, light-headedness, numbness and headaches.   Hematological:  Negative for adenopathy. Does not bruise/bleed easily.   Psychiatric/Behavioral:  Negative for agitation, confusion, decreased concentration, hallucinations, self-injury, sleep disturbance and suicidal ideas. The patient is not nervous/anxious.    Objective:     Vital Signs (Most Recent):  Temp: 97.9 °F (36.6 °C) (07/15/23 1202)  Pulse: 72 (07/15/23 1202)  Resp: 20 (07/15/23 1202)  BP: (!) 91/50 (07/15/23 1202)  SpO2: 97 % (07/15/23 1202) Vital Signs (24h Range):  Temp:  [97.5 °F (36.4 °C)-98.1 °F (36.7 °C)] 97.9 °F (36.6 °C)  Pulse:  [60-74] 72  Resp:  [16-20] 20  SpO2:  [97 %-100 %] 97 %  BP:  ()/(50-59) 91/50     Weight: 86.3 kg (190 lb 4.1 oz)  Body mass index is 25.1 kg/m².    Estimated Creatinine Clearance: 133.2 mL/min (based on SCr of 0.9 mg/dL).     Physical Exam  Vitals and nursing note reviewed.   Constitutional:       General: He is sleeping.      Appearance: He is well-developed.   HENT:      Head: Normocephalic and atraumatic.   Eyes:      General: No scleral icterus.        Right eye: No discharge.         Left eye: No discharge.      Conjunctiva/sclera: Conjunctivae normal.   Pulmonary:      Effort: Pulmonary effort is normal.   Musculoskeletal:         General: Normal range of motion.   Skin:     General: Skin is warm and dry.   Neurological:      Mental Status: He is oriented to person, place, and time and easily aroused.   Psychiatric:         Behavior: Behavior normal.         Thought Content: Thought content normal.         Judgment: Judgment normal.        Significant Labs: Blood Culture:   Recent Labs   Lab 07/14/23 0232   LABBLOO No Growth to date  No Growth to date  No Growth to date  No Growth to date     BMP:   Recent Labs   Lab 07/15/23  0437   *      K 3.7      CO2 23   BUN 5*   CREATININE 0.9   CALCIUM 8.2*   MG 1.8     CBC:   Recent Labs   Lab 07/13/23  1922 07/14/23  0223 07/14/23 0229 07/14/23  0440 07/15/23  0437   WBC 8.59  --   --  5.50 5.66   HGB 15.1  --   --  11.9* 12.0*   HCT 44.5   < > 45 36.8* 35.6*     --   --  206 214    < > = values in this interval not displayed.     CSF:   Recent Labs   Lab 07/14/23 0449   CSFCULTURE No Growth to date       Significant Imaging: I have reviewed all pertinent imaging results/findings within the past 24 hours.

## 2023-07-15 NOTE — ASSESSMENT & PLAN NOTE
I have reviewed hospital notes from   service and other specialty providers. I have also reviewed CBC, CMP/BMP,  cultures and imaging with my interpretation as documented.     Appears to be well controlled with virologic suppression and adequate immune response as evidenced by his CD4 cell count of over 1000.  Seems to be tolerating outpatient therapy, reportedly with Prezcobix and Descovy.   If no contraindication then would resume the patient's anti-retroviral therapy.   Patient can follow up with his outpatient provider at [facility] for ongoing management.   Discussed management plan with the staff and/or members from  service.

## 2023-07-15 NOTE — HPI
A 33-year-old man with HIV, on HAART consisting of Prezcobix plus Descovy, past syphilis infection, seizure disorder, schizophrenia, and substance use who originally presented to [facility] ED with an acute psychosis.  While awaiting transfer to a psychiatric facility he became hypothermic, hypotensive, and bradycardic prompting admission to hospital medicine for further management.  He underwent diagnostic lumbar puncture as well as empiric treatment with antivirals plus antibiotics.    Mr. Pearce reports he has been previously treated for syphilis with penicillin injections.  He reports outpatient laboratory workup performed through [facility] care has been good.

## 2023-07-15 NOTE — HOSPITAL COURSE
Pt is a 32 yo male with hx of Bipolar disorder and polysubstance abuse (ketamine, pentobarbital) who presented to the ED with AMS and psychosis. Pt was on his way to be admitted to inpatient psych when he began to become unresponsive. Pt was PED'd at ED. Pt was hypotensive, bradycardic and hypothermic. ICU was consulted due to MAP below 65 and pt not responding. ICU evaluated the pt who did not meet ICU criteria. USD was remarkable for benzodiazepines and amphetamine. Lab work remarkable for HIV+, RPR + and pending VDRL. ID consulted to rule out neurosyphilis.  Currently on symtuza and haldol. Pt reports taking xanax 6mg daily. Currently on valium taper for benzos withdrawal. Pt's mother contacted, she reports that the pt has no history of seizure disorder, but takes all types of hard drugs. Pt has been on rehab previously. Last episodes of acute psychosis where back in December and March. Was treated for Syphilis at [facility] on 4/2023. CSF VDRL negative, so patient does not need further syphilis treatment. CEC rescinded on 7/18 as patient no longer gravely disabled. CSF fluid growing gram positive rods, speciated to proprionebacterium as likely contaminant. Patient declining inpatient rehab and eager to return to work, stating he has a place to stay. Spoke with mother to update her on patient's status and stable for discharge on 7/19.

## 2023-07-15 NOTE — ASSESSMENT & PLAN NOTE
Patient with prior syphilis infection.  Appears to have been treated in the past.  RPR titer appears to be decreasing going from 1:8 to 1:4.  This is consistent with the previously treated syphilis infection.  The diagnostic lumbar puncture however, does not appear to indicate any evidence of syphilis infection at this time in the setting of a normal cell count.  VDRL is pending.  · Patient appears to have longstanding auditory hallucinations.  Unclear if this is related to his psychiatric illness or syphilis infection.  · I do not favor treatment of syphilis at this time as it appears his RPR titer is decreasing.  · Recommend getting records from [facility] to assess if patient was truly treated after his diagnosis in December.  If records show that his most recent titer of 1-4 is a 4 fold increase from his baseline then may need to consider retreatment for late latent syphilis versus neurosyphilis.  · If VDRL is negative then no indication for intravenous penicillin therapy.

## 2023-07-15 NOTE — PLAN OF CARE
[facility] - [facility] ([place] [address])  Initial Discharge Assessment       Primary Care Provider: Primary Doctor No    Admission Diagnosis: Hypoxia [R09.02]  Acute psychosis [F23]  Chest pain [R07.9]  Hypotension [I95.9]  Medical clearance for psychiatric admission [Z00.8]  Hypothermia, initial encounter [T68.XXXA]    Admission Date: 7/13/2023  Expected Discharge Date:     Transition of Care Barriers: None    Payor: MEDICAID / Plan: [facility] / Product Type: Managed Medicaid /     Extended Emergency Contact Information  Primary Emergency Contact: Fernanda Pearce  Mobile Phone: [phone]  Relation: Mother    Discharge Plan A: Psychiatric hospital  Discharge Plan B: Home with family      [facility] [address] [place] [facility] [place], [place] - [address]  [address] [address]  Phone: [phone] Fax: [phone]    [facility] [place] - [place], [place] - [address]  [address] [address]  Phone: [phone] Fax: [phone]    Patient is PEC.  SW met with patient at bedside to complete discharge planning assessment.  Patient alert and oriented xs 4.  Patient verified all demographic information on facesheet is correct.  Patient verified he has NO PCP at this time.  Patient verified primary health insurance is [employer] ([place] Medicaid).  Patient with NO home health or DME.  Patient with NO POA or Living Will.  Patient not on dialysis or medication coumadin.  Patient with no 30 day admission.  Patient with no financial issues at this time. Patient independent with ADLs, live alone.  Patient uses public transportation.    Initial Assessment (most recent)       Adult Discharge Assessment - 07/15/23 1513          Discharge Assessment    Assessment Type Discharge Planning Assessment     Confirmed/corrected address, phone number and insurance Yes     Confirmed Demographics Correct on Facesheet     Source of Information patient      Communicated LORRIE with patient/caregiver Date not available/Unable to determine     People in Home alone     Facility Arrived From: home     Do you expect to return to your current living situation? Yes     Do you have help at home or someone to help you manage your care at home? Yes     Who are your caregiver(s) and their phone number(s)? mother     Prior to hospitilization cognitive status: Alert/Oriented     Current cognitive status: Alert/Oriented     Equipment Currently Used at Home none     Readmission within 30 days? No     Patient currently being followed by outpatient case management? No     Do you currently have service(s) that help you manage your care at home? No     Do you take prescription medications? No     Do you have prescription coverage? Yes     Do you have any problems affording any of your prescribed medications? TBD     Is the patient taking medications as prescribed? --   unknown    How do you get to doctors appointments? health plan transportation     Are you on dialysis? No     Do you take coumadin? No     Discharge Plan A Psychiatric hospital     Discharge Plan B Home with family     DME Needed Upon Discharge  none     Discharge Plan discussed with: Patient     Transition of Care Barriers None

## 2023-07-15 NOTE — PROGRESS NOTES
Pharmacokinetic Assessment Follow Up: IV Vancomycin    Vancomycin serum concentration assessment(s):    The trough level was drawn correctly (~10.5 hours after last dose) and can be used to guide therapy at this time. The measurement is below the desired definitive target range of 15 to 20 mcg/mL.    Vancomycin Regimen Plan:    Change regimen from Vancomycin 1250 mg IV every 12 hours to Vancomycin 1750 mg IV every 12 hours with next serum trough concentration measured at 1500 prior to 3rd dose on 07/16/2023.    A one time vancomycin 500 mg IV dose was given to supplement the change.  RN hung 1250 mg bag before adjusting regimen.      Drug levels (last 3 results):  Recent Labs   Lab Result Units 07/15/23  1527   Vancomycin-Trough ug/mL 10.4       Pharmacy will continue to follow and monitor vancomycin.    Please contact pharmacy at extension [phone] for questions regarding this assessment.    Thank you for the consult,   Rosmery Salter       Patient brief summary:  Flako Pearce is a 33 y.o. male initiated on antimicrobial therapy with IV Vancomycin for treatment of sepsis    Drug Allergies:   Review of patient's allergies indicates:  No Known Allergies    Actual Body Weight:   86.3 kg    Renal Function:   Estimated Creatinine Clearance: 133.2 mL/min (based on SCr of 0.9 mg/dL).,     Dialysis Method (if applicable):  N/A    CBC (last 72 hours):  Recent Labs   Lab Result Units 07/13/23  1922 07/14/23  0440 07/15/23  0437   WBC K/uL 8.59 5.50 5.66   Hemoglobin g/dL 15.1 11.9* 12.0*   Hematocrit % 44.5 36.8* 35.6*   Platelets K/uL 321 206 214   Gran % % 60.2 45.3 50.6   Lymph % % 29.0 40.0 35.9   Mono % % 8.7 11.6 10.2   Eosinophil % % 1.4 2.4 2.7   Basophil % % 0.5 0.5 0.4   Differential Method  Automated Automated Automated       Metabolic Panel (last 72 hours):  Recent Labs   Lab Result Units 07/13/23  1919 07/13/23 1922 07/14/23 0440 07/14/23 0449 07/15/23  0437   Sodium mmol/L  --  139 137  --  139   Potassium  mmol/L  --  3.9 3.4*  --  3.7   Chloride mmol/L  --  107 114*  --  109   CO2 mmol/L  --  20* 18*  --  23   Glucose mg/dL  --  71 89  --  114*   Glucose, CSF mg/dL  --   --   --  60  --    Glucose, UA  Negative  --   --   --   --    BUN mg/dL  --  12 8  --  5*   Creatinine mg/dL  --  1.2 0.9  --  0.9   Creatinine, Urine mg/dL 68.0  --   --   --   --    Albumin g/dL  --  4.7 3.1*  --  3.0*   Total Bilirubin mg/dL  --  0.8 0.4  --  0.3   Alkaline Phosphatase U/L  --  86 59  --  57   AST U/L  --  22 14  --  10   ALT U/L  --  26 15  --  13   Magnesium mg/dL  --   --   --   --  1.8   Phosphorus mg/dL  --   --   --   --  3.3       Vancomycin Administrations:  vancomycin given in the last 96 hours                     vancomycin 1,250 mg in dextrose 5 % (D5W) 250 mL IVPB (Vial-Mate) (mg) 1,250 mg New Bag 07/15/23 1601     1,250 mg New Bag  0502     1,250 mg New Bag 07/14/23 1641    vancomycin 1.75 g in 5 % dextrose 500 mL IVPB (mg) 1,750 mg New Bag 07/14/23 0437                    Microbiologic Results:  Microbiology Results (last 7 days)       Procedure Component Value Units Date/Time    CSF culture and Gram Stain (Tube 2) [[id number]] Collected: 07/14/23 0449    Order Status: Completed Specimen: CSF (Spinal Fluid) from CSF Tap, Tube 2 Updated: 07/15/23 0707     CSF CULTURE No Growth to date     Gram Stain Result No WBC's      No organisms seen    Narrative:      On which sequentially labeled tube should this analysis be  performed?->2    Blood culture #2 **CANNOT BE ORDERED STAT** [[id number]] Collected: 07/14/23 0232    Order Status: Completed Specimen: Blood from Peripheral, Antecubital, Left Updated: 07/15/23 0613     Blood Culture, Routine No Growth to date      No Growth to date    Blood culture #1 **CANNOT BE ORDERED STAT** [[id number]] Collected: 07/14/23 0232    Order Status: Completed Specimen: Blood from Peripheral, Forearm, Right Updated: 07/15/23 0613     Blood Culture, Routine No Growth to date      No Growth to  date    Cryptococcal antigen, CSF (Tube 3) [[id number]] Collected: 07/14/23 0449    Order Status: Completed Specimen: CSF (Spinal Fluid) from CSF Tap, Tube 2 Updated: 07/14/23 1131     Crypto Ag, CSF Negative    Narrative:      On which sequentially labeled tube should this analysis be  performed?->3    AFB Culture & Smear (Tube 3) [[id number]] Collected: 07/14/23 0449    Order Status: Sent Specimen: CSF (Spinal Fluid) from CSF Tap, Tube 2 Updated: 07/14/23 0628    Fungus culture (Tube 3) [[id number]] Collected: 07/14/23 0449    Order Status: Sent Specimen: CSF (Spinal Fluid) from CSF Tap, Tube 2 Updated: 07/14/23 0626    CSF culture [[id number]] Collected: 07/14/23 0449    Order Status: Canceled Specimen: CSF (Spinal Fluid) from CSF Tap, Tube 2

## 2023-07-15 NOTE — PROGRESS NOTES
[facility] ([address] Medicine  Progress Note    Patient Name: Flako Pearce  MRN: [id number]  Patient Class: IP- Inpatient   Admission Date: 7/13/2023  Length of Stay: 1 days  Attending Physician: Whit Washington MD  Primary Care Provider: Primary Doctor No        Subjective:     Principal Problem:Severe sepsis        HPI:  32 y/o M with PMH of HIV, seizure disorder, and schizophrenia presented with acute psychosis. Per ED personnel and records, pt was seen and medically cleared by previous team in [facility] ED, however while waiting for transfer to psych facility he became hypotensive, bradycardic, and hypothermic. Following this event, an extensive workup was performed including a additional sepsis evaluation, CT head, CT chest, CT abd/pelvis, lumbar puncture. He received fluids and empiric antibiotics and antivirals. He was also put on a rewarming device. BP improved s/p fluid resuscitation. He is not hypercapnic. Labs are still pending, but pt currently being treated for sepsis of unknown origin. Pt is still under PEC.    Following the initial events that incited full medical workup, my initial evaluation of the patient was difficult 2/2 pt somnolence. Pt would open his eyes briefly, but only after a light sternal rub. He would attempt to answer questions, but then quickly fell back asleep. I was unable to obtain any further/useful information from the patient regarding his condition.      Overview/Hospital Course:  Pt is a 32 yo male with hx of Bipolar disorder and polysubstance abuse who presented to the ED with AMS and psychosis. Pt was on his way to be admitted to inpatient psych when he began to become unresponsive. Pt was PED'd at ED. Pt was hypotensive, bradycardic and hypothermic. ICU was consulted due to MAP below 65 and pt not responding. ICU evaluated the pt who did not meet ICU criteria. USD was remarkable for benzodiazepines and amphetamine. Lab work remarkable for HIV+,  RPR + and pending VDRL. ID consulted to rule out neurosyphilis.  Currently on symtuza and haldol. Pt reports taking xanax 6mg daily. Currently on valium taper for benzos withdrawal. Pt's mother contacted, she reports that the pt has no history of seizure disorder, but takes all types of hard drugs. Pt has been on rehab previously. Last episodes of acute psychosis where back in December and March.       Interval History: Pt is responsive and able to have conversations. VS stable, pt reports hearing voices of friends, family and landlord. Pt reports taking xanax 6 mg daily. PEC continued.     Review of Systems   Constitutional:  Positive for activity change and fatigue.   HENT: Negative.     Respiratory: Negative.     Cardiovascular: Negative.    Gastrointestinal: Negative.    Musculoskeletal: Negative.    Skin: Negative.    Neurological:  Positive for weakness.   Psychiatric/Behavioral:  Positive for decreased concentration.    Objective:     Vital Signs (Most Recent):  Temp: 97.9 °F (36.6 °C) (07/15/23 1202)  Pulse: 72 (07/15/23 1202)  Resp: 20 (07/15/23 1202)  BP: (!) 91/50 (07/15/23 1202)  SpO2: 97 % (07/15/23 1202) Vital Signs (24h Range):  Temp:  [97.5 °F (36.4 °C)-98.1 °F (36.7 °C)] 97.9 °F (36.6 °C)  Pulse:  [64-74] 72  Resp:  [16-20] 20  SpO2:  [97 %-98 %] 97 %  BP: ()/(50-59) 91/50     Weight: 86.3 kg (190 lb 4.1 oz)  Body mass index is 25.1 kg/m².    Intake/Output Summary (Last 24 hours) at 7/15/2023 1613  Last data filed at 7/15/2023 0417  Gross per 24 hour   Intake 720 ml   Output 4325 ml   Net -3605 ml         Physical Exam  Constitutional:       Appearance: He is ill-appearing.   HENT:      Head: Normocephalic and atraumatic.      Nose: Nose normal.      Mouth/Throat:      Mouth: Mucous membranes are dry.   Eyes:      Pupils: Pupils are equal, round, and reactive to light.   Cardiovascular:      Rate and Rhythm: Normal rate and regular rhythm.      Pulses: Normal pulses.      Heart sounds: Normal  heart sounds.   Pulmonary:      Effort: Pulmonary effort is normal.      Breath sounds: Normal breath sounds.   Abdominal:      General: Bowel sounds are normal.   Musculoskeletal:         General: Normal range of motion.      Cervical back: Normal range of motion.   Skin:     Coloration: Skin is pale.   Neurological:      Mental Status: He is lethargic.   Psychiatric:         Attention and Perception: He perceives auditory hallucinations.         Mood and Affect: Mood is anxious.         Speech: Speech is slurred.         Behavior: Behavior is slowed.         Thought Content: Thought content is paranoid.           Significant Labs: All pertinent labs within the past 24 hours have been reviewed.    Significant Imaging: I have reviewed all pertinent imaging results/findings within the past 24 hours.      Assessment/Plan:      * Severe sepsis  - Sepsis of unknown origin  - Continue broad spectrum coverage with Vanc, Cefepime, Acyclovir  - Consulted MICU 2/2 hypotension in the ED, additional workup requested, results pending.  - continue fluid resuscitation        Psychosis  - PEC in place  - Consult inpatient psych  - Haldol PRN and Ativan PRN ordered for patient and staff safety.  - Acute psychiatric condition possibly 2/2 ongoing sepsis        Seizure disorder  -Hx of seizure disorder  - unknown if pt currently taking medication      HIV disease  -Unsure of current disease state/progression. Per psychiatry note in 3/2023 pt was taking descovy and prezcobix.  - last known CD4 count was 605 in 2018  - will obtain HIV panel and CD4 count  - ID consult if necessary      VTE Risk Mitigation (From admission, onward)         Ordered     heparin (porcine) injection 5,000 Units  Every 8 hours         07/14/23 0725     IP VTE HIGH RISK PATIENT  Once         07/14/23 0725     Place sequential compression device  Until discontinued         07/14/23 0725                Discharge Planning   LORRIE:      Code Status: Full Code   Is  the patient medically ready for discharge?: No    Reason for patient still in hospital (select all that apply): Treatment  Discharge Plan A: [facility]                  Jagdish Arnold MD  Department of Hospital Medicine   [facility] - Intensive Care ([place]

## 2023-07-15 NOTE — SUBJECTIVE & OBJECTIVE
Interval History: Pt is responsive and able to have conversations. VS stable, pt reports hearing voices of friends, family and landlord. Pt reports taking xanax 6 mg daily. PEC continued.     Review of Systems   Constitutional:  Positive for activity change and fatigue.   HENT: Negative.     Respiratory: Negative.     Cardiovascular: Negative.    Gastrointestinal: Negative.    Musculoskeletal: Negative.    Skin: Negative.    Neurological:  Positive for weakness.   Psychiatric/Behavioral:  Positive for decreased concentration.    Objective:     Vital Signs (Most Recent):  Temp: 97.9 °F (36.6 °C) (07/15/23 1202)  Pulse: 72 (07/15/23 1202)  Resp: 20 (07/15/23 1202)  BP: (!) 91/50 (07/15/23 1202)  SpO2: 97 % (07/15/23 1202) Vital Signs (24h Range):  Temp:  [97.5 °F (36.4 °C)-98.1 °F (36.7 °C)] 97.9 °F (36.6 °C)  Pulse:  [64-74] 72  Resp:  [16-20] 20  SpO2:  [97 %-98 %] 97 %  BP: ()/(50-59) 91/50     Weight: 86.3 kg (190 lb 4.1 oz)  Body mass index is 25.1 kg/m².    Intake/Output Summary (Last 24 hours) at 7/15/2023 1613  Last data filed at 7/15/2023 0417  Gross per 24 hour   Intake 720 ml   Output 4325 ml   Net -3605 ml         Physical Exam  Constitutional:       Appearance: He is ill-appearing.   HENT:      Head: Normocephalic and atraumatic.      Nose: Nose normal.      Mouth/Throat:      Mouth: Mucous membranes are dry.   Eyes:      Pupils: Pupils are equal, round, and reactive to light.   Cardiovascular:      Rate and Rhythm: Normal rate and regular rhythm.      Pulses: Normal pulses.      Heart sounds: Normal heart sounds.   Pulmonary:      Effort: Pulmonary effort is normal.      Breath sounds: Normal breath sounds.   Abdominal:      General: Bowel sounds are normal.   Musculoskeletal:         General: Normal range of motion.      Cervical back: Normal range of motion.   Skin:     Coloration: Skin is pale.   Neurological:      Mental Status: He is lethargic.   Psychiatric:         Attention and Perception:  He perceives auditory hallucinations.         Mood and Affect: Mood is anxious.         Speech: Speech is slurred.         Behavior: Behavior is slowed.         Thought Content: Thought content is paranoid.           Significant Labs: All pertinent labs within the past 24 hours have been reviewed.    Significant Imaging: I have reviewed all pertinent imaging results/findings within the past 24 hours.

## 2023-07-16 LAB
ALBUMIN SERPL BCP-MCNC: 3.2 G/DL (ref 3.5–5.2)
ALP SERPL-CCNC: 61 U/L (ref 55–135)
ALT SERPL W/O P-5'-P-CCNC: 13 U/L (ref 10–44)
ANION GAP SERPL CALC-SCNC: 6 MMOL/L (ref 8–16)
AST SERPL-CCNC: 10 U/L (ref 10–40)
BASOPHILS # BLD AUTO: 0.04 K/UL (ref 0–0.2)
BASOPHILS NFR BLD: 0.6 % (ref 0–1.9)
BILIRUB SERPL-MCNC: 0.3 MG/DL (ref 0.1–1)
BUN SERPL-MCNC: 4 MG/DL (ref 6–20)
CALCIUM SERPL-MCNC: 8.6 MG/DL (ref 8.7–10.5)
CHLORIDE SERPL-SCNC: 106 MMOL/L (ref 95–110)
CO2 SERPL-SCNC: 26 MMOL/L (ref 23–29)
CREAT SERPL-MCNC: 0.9 MG/DL (ref 0.5–1.4)
DIFFERENTIAL METHOD: ABNORMAL
EOSINOPHIL # BLD AUTO: 0.2 K/UL (ref 0–0.5)
EOSINOPHIL NFR BLD: 2.7 % (ref 0–8)
ERYTHROCYTE [DISTWIDTH] IN BLOOD BY AUTOMATED COUNT: 13.3 % (ref 11.5–14.5)
EST. GFR  (NO RACE VARIABLE): >60 ML/MIN/1.73 M^2
GLUCOSE SERPL-MCNC: 112 MG/DL (ref 70–110)
HCT VFR BLD AUTO: 39.1 % (ref 40–54)
HGB BLD-MCNC: 13.1 G/DL (ref 14–18)
IMM GRANULOCYTES # BLD AUTO: 0.01 K/UL (ref 0–0.04)
IMM GRANULOCYTES NFR BLD AUTO: 0.2 % (ref 0–0.5)
LYMPHOCYTES # BLD AUTO: 1.8 K/UL (ref 1–4.8)
LYMPHOCYTES NFR BLD: 28 % (ref 18–48)
M TB CMPLX DNA SPEC QL NAA+PROBE: NEGATIVE
MAGNESIUM SERPL-MCNC: 1.8 MG/DL (ref 1.6–2.6)
MCH RBC QN AUTO: 30.2 PG (ref 27–31)
MCHC RBC AUTO-ENTMCNC: 33.5 G/DL (ref 32–36)
MCV RBC AUTO: 90 FL (ref 82–98)
MONOCYTES # BLD AUTO: 0.7 K/UL (ref 0.3–1)
MONOCYTES NFR BLD: 10.2 % (ref 4–15)
NEUTROPHILS # BLD AUTO: 3.7 K/UL (ref 1.8–7.7)
NEUTROPHILS NFR BLD: 58.3 % (ref 38–73)
NRBC BLD-RTO: 0 /100 WBC
PHOSPHATE SERPL-MCNC: 2.8 MG/DL (ref 2.7–4.5)
PLATELET # BLD AUTO: 221 K/UL (ref 150–450)
PMV BLD AUTO: 9.8 FL (ref 9.2–12.9)
POCT GLUCOSE: 125 MG/DL (ref 70–110)
POCT GLUCOSE: 85 MG/DL (ref 70–110)
POTASSIUM SERPL-SCNC: 3.6 MMOL/L (ref 3.5–5.1)
PROT SERPL-MCNC: 5.9 G/DL (ref 6–8.4)
RBC # BLD AUTO: 4.34 M/UL (ref 4.6–6.2)
SODIUM SERPL-SCNC: 138 MMOL/L (ref 136–145)
SPECIMEN SOURCE: NORMAL
SPECIMEN SOURCE: NORMAL
T GONDII DNA CSF QL NAA+PROBE: NEGATIVE
WBC # BLD AUTO: 6.36 K/UL (ref 3.9–12.7)

## 2023-07-16 PROCEDURE — 63600175 PHARM REV CODE 636 W HCPCS

## 2023-07-16 PROCEDURE — 99232 PR SUBSEQUENT HOSPITAL CARE,LEVL II: ICD-10-PCS | Mod: ,,, | Performed by: INTERNAL MEDICINE

## 2023-07-16 PROCEDURE — 99232 PR SUBSEQUENT HOSPITAL CARE,LEVL II: ICD-10-PCS | Mod: 95,AF,HB, | Performed by: STUDENT IN AN ORGANIZED HEALTH CARE EDUCATION/TRAINING PROGRAM

## 2023-07-16 PROCEDURE — 63600175 PHARM REV CODE 636 W HCPCS: Performed by: HOSPITALIST

## 2023-07-16 PROCEDURE — 25000003 PHARM REV CODE 250

## 2023-07-16 PROCEDURE — 80053 COMPREHEN METABOLIC PANEL: CPT

## 2023-07-16 PROCEDURE — 99232 SBSQ HOSP IP/OBS MODERATE 35: CPT | Mod: ,,, | Performed by: INTERNAL MEDICINE

## 2023-07-16 PROCEDURE — 84100 ASSAY OF PHOSPHORUS: CPT

## 2023-07-16 PROCEDURE — 20600001 HC STEP DOWN PRIVATE ROOM

## 2023-07-16 PROCEDURE — 36415 COLL VENOUS BLD VENIPUNCTURE: CPT

## 2023-07-16 PROCEDURE — 99233 PR SUBSEQUENT HOSPITAL CARE,LEVL III: ICD-10-PCS | Mod: ,,, | Performed by: HOSPITALIST

## 2023-07-16 PROCEDURE — 85025 COMPLETE CBC W/AUTO DIFF WBC: CPT

## 2023-07-16 PROCEDURE — 99232 SBSQ HOSP IP/OBS MODERATE 35: CPT | Mod: 95,AF,HB, | Performed by: STUDENT IN AN ORGANIZED HEALTH CARE EDUCATION/TRAINING PROGRAM

## 2023-07-16 PROCEDURE — 99233 SBSQ HOSP IP/OBS HIGH 50: CPT | Mod: ,,, | Performed by: HOSPITALIST

## 2023-07-16 PROCEDURE — 83735 ASSAY OF MAGNESIUM: CPT

## 2023-07-16 PROCEDURE — 25000003 PHARM REV CODE 250: Performed by: HOSPITALIST

## 2023-07-16 RX ORDER — DIAZEPAM 5 MG/1
5 TABLET ORAL EVERY 12 HOURS
Status: COMPLETED | OUTPATIENT
Start: 2023-07-16 | End: 2023-07-17

## 2023-07-16 RX ORDER — MUPIROCIN 20 MG/G
OINTMENT TOPICAL 2 TIMES DAILY
Status: DISCONTINUED | OUTPATIENT
Start: 2023-07-16 | End: 2023-07-19 | Stop reason: HOSPADM

## 2023-07-16 RX ORDER — POTASSIUM CHLORIDE 750 MG/1
30 CAPSULE, EXTENDED RELEASE ORAL ONCE
Status: COMPLETED | OUTPATIENT
Start: 2023-07-16 | End: 2023-07-16

## 2023-07-16 RX ADMIN — METRONIDAZOLE 500 MG: 500 INJECTION, SOLUTION INTRAVENOUS at 03:07

## 2023-07-16 RX ADMIN — LORAZEPAM 2 MG: 2 INJECTION INTRAMUSCULAR; INTRAVENOUS at 01:07

## 2023-07-16 RX ADMIN — CEFEPIME 2 G: 2 INJECTION, POWDER, FOR SOLUTION INTRAVENOUS at 06:07

## 2023-07-16 RX ADMIN — METRONIDAZOLE 500 MG: 500 INJECTION, SOLUTION INTRAVENOUS at 12:07

## 2023-07-16 RX ADMIN — HEPARIN SODIUM 5000 UNITS: 5000 INJECTION INTRAVENOUS; SUBCUTANEOUS at 01:07

## 2023-07-16 RX ADMIN — CEFEPIME 2 G: 2 INJECTION, POWDER, FOR SOLUTION INTRAVENOUS at 10:07

## 2023-07-16 RX ADMIN — HALOPERIDOL 2 MG: 2 TABLET ORAL at 03:07

## 2023-07-16 RX ADMIN — DIAZEPAM 5 MG: 5 TABLET ORAL at 06:07

## 2023-07-16 RX ADMIN — POTASSIUM CHLORIDE 30 MEQ: 10 CAPSULE, COATED, EXTENDED RELEASE ORAL at 08:07

## 2023-07-16 RX ADMIN — HALOPERIDOL 2 MG: 2 TABLET ORAL at 08:07

## 2023-07-16 RX ADMIN — CEFEPIME 2 G: 2 INJECTION, POWDER, FOR SOLUTION INTRAVENOUS at 02:07

## 2023-07-16 RX ADMIN — DIAZEPAM 5 MG: 5 TABLET ORAL at 09:07

## 2023-07-16 RX ADMIN — VANCOMYCIN HYDROCHLORIDE 1750 MG: 10 INJECTION, POWDER, LYOPHILIZED, FOR SOLUTION INTRAVENOUS at 04:07

## 2023-07-16 RX ADMIN — ACYCLOVIR SODIUM 800 MG: 1000 INJECTION, SOLUTION INTRAVENOUS at 10:07

## 2023-07-16 RX ADMIN — ACYCLOVIR SODIUM 800 MG: 1000 INJECTION, SOLUTION INTRAVENOUS at 02:07

## 2023-07-16 RX ADMIN — HALOPERIDOL 2 MG: 2 TABLET ORAL at 09:07

## 2023-07-16 NOTE — PROGRESS NOTES
Therapy with vancomycin has been discontinued by provider.  Will sign off, please re-consult, if needed.      Hilda Higuera, Pharm. D.   Pharmacist  [facility]

## 2023-07-16 NOTE — NURSING
Patient alert, O x 4, and ambulates to restroom. Patient is calm, cooperative, and follows commands. Vitals stable this shift and no new events overnight.

## 2023-07-16 NOTE — ASSESSMENT & PLAN NOTE
History of syphilis infection in the past. Had titer of 1:8 in Dec 2022, currently 1:4. Patient reports being treated within the last few months. Possible that there has not been sufficient time since treatment to allow for the full 4-fold decrease in titer yet. LP performed with normal cell count. VDRL still pending. Unclear if patient's auditory hallucinations are related to syphilis vs undiagnosed psychiatric condition.     Recommendations:  1. Hold off on syphilis treatment at this time  2. If VDRL is negative, no need to treat for neurosyphilis with IV PCN  3. Recommend stopping vancomycin, acyclovir, and metronidazole  4. Can continue cefepime for now

## 2023-07-16 NOTE — SUBJECTIVE & OBJECTIVE
Interval History:     Today patient states that he is feeling well, denies any complaints. Has been afebrile with normal WBC. Blood and CSF cultures with no growth to date. Fungal and AFB cultures still in process.     Review of Systems   Constitutional:  Negative for chills and fever.   HENT:  Negative for sore throat.    Respiratory:  Negative for cough and shortness of breath.    Cardiovascular:  Negative for chest pain, palpitations and leg swelling.   Gastrointestinal:  Negative for abdominal pain, constipation, diarrhea, nausea and vomiting.   Genitourinary:  Negative for dysuria and hematuria.   Musculoskeletal:  Negative for arthralgias and myalgias.   Skin:  Negative for rash.   Neurological:  Negative for weakness and headaches.   Objective:     Vital Signs (Most Recent):  Temp: 98.9 °F (37.2 °C) (07/16/23 1200)  Pulse: 83 (07/16/23 1200)  Resp: 20 (07/16/23 1200)  BP: (!) 106/57 (07/16/23 1200)  SpO2: 99 % (07/16/23 1200) Vital Signs (24h Range):  Temp:  [97.7 °F (36.5 °C)-98.9 °F (37.2 °C)] 98.9 °F (37.2 °C)  Pulse:  [68-83] 83  Resp:  [16-20] 20  SpO2:  [96 %-99 %] 99 %  BP: ()/(55-58) 106/57     Weight: 86.3 kg (190 lb 4.1 oz)  Body mass index is 25.1 kg/m².    Estimated Creatinine Clearance: 133.2 mL/min (based on SCr of 0.9 mg/dL).     Physical Exam  Vitals reviewed.   Constitutional:       General: He is not in acute distress.     Appearance: Normal appearance. He is not ill-appearing.   HENT:      Head: Normocephalic and atraumatic.   Eyes:      Extraocular Movements: Extraocular movements intact.      Conjunctiva/sclera: Conjunctivae normal.   Cardiovascular:      Rate and Rhythm: Normal rate and regular rhythm.      Heart sounds: No murmur heard.  Pulmonary:      Effort: Pulmonary effort is normal. No respiratory distress.      Breath sounds: Normal breath sounds.   Abdominal:      General: Abdomen is flat. Bowel sounds are normal.      Palpations: Abdomen is soft.      Tenderness: There is  no abdominal tenderness.   Musculoskeletal:      Cervical back: Normal range of motion.   Skin:     General: Skin is warm and dry.   Neurological:      Mental Status: He is alert and oriented to person, place, and time.   Psychiatric:         Mood and Affect: Mood normal.         Behavior: Behavior normal.        Significant Labs: All pertinent labs within the past 24 hours have been reviewed.  Recent Lab Results         07/16/23  0718   07/16/23  0641   07/15/23  1527        Albumin   3.2         Alkaline Phosphatase   61         ALT   13         Anion Gap   6         AST   10         Baso #   0.04         Basophil %   0.6         BILIRUBIN TOTAL   0.3  Comment: For infants and newborns, interpretation of results should be based  on gestational age, weight and in agreement with clinical  observations.    Premature Infant recommended reference ranges:  Up to 24 hours.............<8.0 mg/dL  Up to 48 hours............<12.0 mg/dL  3-5 days..................<15.0 mg/dL  6-29 days.................<15.0 mg/dL           BUN   4         Calcium   8.6         Chloride   106         CO2   26         Creatinine   0.9         Differential Method   Automated         eGFR   >60.0         Eos #   0.2         Eosinophil %   2.7         Glucose   112         Gran # (ANC)   3.7         Gran %   58.3         Hematocrit   39.1         Hemoglobin   13.1         Immature Grans (Abs)   0.01  Comment: Mild elevation in immature granulocytes is non specific and   can be seen in a variety of conditions including stress response,   acute inflammation, trauma and pregnancy. Correlation with other   laboratory and clinical findings is essential.           Immature Granulocytes   0.2         Lymph #   1.8         Lymph %   28.0         Magnesium   1.8         MCH   30.2         MCHC   33.5         MCV   90         Mono #   0.7         Mono %   10.2         MPV   9.8         nRBC   0         Phosphorus   2.8         Platelets   221         POCT  Glucose 125           Potassium   3.6         PROTEIN TOTAL   5.9         RBC   4.34         RDW   13.3         Sodium   138         Vancomycin-Trough     10.4       WBC   6.36                 Significant Imaging: I have reviewed all pertinent imaging results/findings within the past 24 hours.

## 2023-07-16 NOTE — PROGRESS NOTES
"CONSULTATION LIAISON PSYCHIATRY PROGRESS NOTE    Patient Name: Flako Pearce  MRN: [id number]  Patient Class: IP- Inpatient  Admission Date: 7/13/2023  Attending Physician: Whit Washington MD      SUBJECTIVE:   Flako Pearce is a 33 y.o. male with past psychiatric history of bipolar I disorder, SIPD, amphetamine use disorder, sedative, hypnotic, or anxiolytic (benzodiazepine) use disorder, and anxiety & past pertinent medical history of seizures, latent syphyllis treated on 07/2022, HIV on descovy and prezcobix, presents to the ED/admitted to the hospital for Severe sepsis. UDS+ amphetamines, benzodiazepines on admission)    Psychiatry consulted for PEC for grave disability / psychosis    No acute events overnight; no behavioral PRNs needed. Pt is adherent to all scheduled psychiatric medications. During interview this morning, pt reports that he slept OK and that he "just wants to get back to his normal life." Pt reports that he is an  at the [employer]. Regarding withdrawal symptoms, he reports he stills feels sweaty and clammy. Denies VH but when asked about AH, reports he heard a voice today and yesterday telling him to leave the hospital. Pt reports that he receives Haldol D 50mg IM from Dr. Marcial every month.        OBJECTIVE:    Mental Status Exam:  General Appearance: appears stated age, dressed in hospital garb, poorly groomed  Behavior: cooperative, poor eye contact  Involuntary Movements and Motor Activity: no abnormal involuntary movements noted  Gait and Station: unable to assess - patient lying down or seated  Speech and Language: normal rate, normal rhythm, normal volume, soft  Mood: "OK, but down"  Affect: flat  Thought Process and Associations: linear, goal-directed, organized  Thought Content and Perceptions:: no suicidal ideation, no homicidal ideation, + auditory hallucinations  Sensorium and Orientation: grossly intact  Recent and Remote Memory: grossly " intact  Attention and Concentration: grossly intact  Fund of Knowledge: grossly intact, vocabulary appropriate  Insight: limited/partial awareness of illness  Judgment: adequate    CAM ICU positive? no      ASSESSMENT & RECOMMENDATIONS   Psychosis, not otherwise specified vs substance-induced psychotic disorder  Polysubstance use disorder (alcohol, stimulants, benzodiazepines, ketamine    Psychosis NOS (suspect SIPD)  PSYCH MEDICATIONS  Continue home Haldol 2 mg TID  PRN Haldol 5 mg PO/IM and Ativan 2 mg PO/IM q6h for non-redirectable agitation  Monitor QTc (QTc 7/14/23: 421 ms)     Sedative, hypnotic, or anxiolytic (benzodiazepine) use disorder  PSYCH MEDICATIONS  Would recommend continuing benzodiazepine taper per primary team  Monitor for s/sx of benzodiazepine withdrawal and utilize Ativan PRN for breakthrough symptoms per CIWA protocol  Ativan 2mg PO/IM q6hr PRN for any two of the following: SBP>160, DBP>100, HR>100, tremor, or diaphoresis       OTHER PERTINENT DIAGNOSIS  Amphetamine use disorder  Polysubstance abuse (benzos, stimulants, Ketamine, pentobarbital)     RISK ASSESSMENT  Continue PEC/CEC for grave disability & NEEDS 1:1 sitter     FOLLOW UP  Will follow up while in house     DISPOSITION - once medically cleared:   Seek involuntary inpatient psychiatric admission for stabilization of acute psychiatric symptoms and a safe disposition plan is enacted. The patient &/or their family was informed that the patient will be transferred to an inpatient unit per ED/primary placement team.        Please contact ON CALL psychiatry service (24/7) for any acute issues that may arise.    Dr. Shamar Toribio  CL Psychiatry, PGY-II  [facility]  7/16/2023 1:11  PM        --------------------------------------------------------------------------------------------------------------------------------------------------------------------------------------------------------------------------------------    CONTINUED OBJECTIVE clinical data & findings reviewed and noted for above decision making    Current Medications:   Scheduled Meds:    acyclovir  10 mg/kg (Ideal) Intravenous Q8H    ceFEPime (MAXIPIME) IVPB  2 g Intravenous Q8H    diazePAM  5 mg Oral Q8H    haloperidoL  2 mg Oral TID    heparin (porcine)  5,000 Units Subcutaneous Q8H    metronidazole  500 mg Intravenous Q8H    potassium chloride  30 mEq Oral Once    vancomycin (VANCOCIN) IV (PEDS and ADULTS)  20 mg/kg Intravenous Q12H     PRN Meds: acetaminophen, dextrose 10%, dextrose 10%, dextrose, dextrose, glucagon (human recombinant), haloperidol lactate, lorazepam, naloxone, ondansetron, sodium chloride 0.9%, Pharmacy to dose Vancomycin consult **AND** vancomycin - pharmacy to dose    Allergies:   Review of patient's allergies indicates:  No Known Allergies    Vitals  Vitals:    07/16/23 0718   BP: (!) 104/58   Pulse: 73   Resp: 20   Temp: 97.9 °F (36.6 °C)       Labs/Imaging/Studies:  Recent Results (from the past 24 hour(s))   VANCOMYCIN, TROUGH    Collection Time: 07/15/23  3:27 PM   Result Value Ref Range    Vancomycin-Trough 10.4 10.0 - 22.0 ug/mL   CBC Auto Differential    Collection Time: 07/16/23  6:41 AM   Result Value Ref Range    WBC 6.36 3.90 - 12.70 K/uL    RBC 4.34 (L) 4.60 - 6.20 M/uL    Hemoglobin 13.1 (L) 14.0 - 18.0 g/dL    Hematocrit 39.1 (L) 40.0 - 54.0 %    MCV 90 82 - 98 fL    MCH 30.2 27.0 - 31.0 pg    MCHC 33.5 32.0 - 36.0 g/dL    RDW 13.3 11.5 - 14.5 %    Platelets 221 150 - 450 K/uL    MPV 9.8 9.2 - 12.9 fL    Immature Granulocytes 0.2 0.0 - 0.5 %    Gran # (ANC) 3.7 1.8 - 7.7 K/uL    Immature Grans (Abs) 0.01 0.00 - 0.04 K/uL    Lymph # 1.8 1.0 - 4.8 K/uL    Mono # 0.7 0.3 - 1.0 K/uL    Eos #  0.2 0.0 - 0.5 K/uL    Baso # 0.04 0.00 - 0.20 K/uL    nRBC 0 0 /100 WBC    Gran % 58.3 38.0 - 73.0 %    Lymph % 28.0 18.0 - 48.0 %    Mono % 10.2 4.0 - 15.0 %    Eosinophil % 2.7 0.0 - 8.0 %    Basophil % 0.6 0.0 - 1.9 %    Differential Method Automated    Comprehensive Metabolic Panel    Collection Time: 07/16/23  6:41 AM   Result Value Ref Range    Sodium 138 136 - 145 mmol/L    Potassium 3.6 3.5 - 5.1 mmol/L    Chloride 106 95 - 110 mmol/L    CO2 26 23 - 29 mmol/L    Glucose 112 (H) 70 - 110 mg/dL    BUN 4 (L) 6 - 20 mg/dL    Creatinine 0.9 0.5 - 1.4 mg/dL    Calcium 8.6 (L) 8.7 - 10.5 mg/dL    Total Protein 5.9 (L) 6.0 - 8.4 g/dL    Albumin 3.2 (L) 3.5 - 5.2 g/dL    Total Bilirubin 0.3 0.1 - 1.0 mg/dL    Alkaline Phosphatase 61 55 - 135 U/L    AST 10 10 - 40 U/L    ALT 13 10 - 44 U/L    eGFR >60.0 >60 mL/min/1.73 m^2    Anion Gap 6 (L) 8 - 16 mmol/L   Magnesium    Collection Time: 07/16/23  6:41 AM   Result Value Ref Range    Magnesium 1.8 1.6 - 2.6 mg/dL   Phosphorus    Collection Time: 07/16/23  6:41 AM   Result Value Ref Range    Phosphorus 2.8 2.7 - 4.5 mg/dL   POCT glucose    Collection Time: 07/16/23  7:18 AM   Result Value Ref Range    POCT Glucose 125 (H) 70 - 110 mg/dL     Imaging Results              CT Chest Abdomen Pelvis With Contrast (xpd) (Final result)  Result time 07/14/23 10:59:18   Procedure changed from CT Abdomen Pelvis With Contrast     Final result by Gigi Underwood MD (07/14/23 10:59:18)                   Impression:      Subcentimeter left lobe hepatic cyst.    Mild hepatomegaly.    Trace ascites.      Electronically signed by: Gigi Underwood MD  Date:    07/14/2023  Time:    10:59               Narrative:    EXAMINATION:  CT CHEST ABDOMEN PELVIS WITH CONTRAST (XPD)    CLINICAL HISTORY:  Sepsis;    TECHNIQUE:  Low dose axial images, sagittal and coronal reformations were obtained from the thoracic inlet to the pubic symphysis following the IV administration of 100 mL of Omnipaque 350.   No oral contrast was administered.    COMPARISON:  None    FINDINGS:  Soft tissue structures at the base of the neck appear unremarkable.  The heart is not enlarged.  The thoracic aorta is normal in caliber without evidence for aneurysmal dilatation.  No hilar or mediastinal adenopathy is identified.  No axillary adenopathy is identified.  There is some respiratory motion.  There are mild dependent atelectatic changes within the lung bases.  No lung suspicious lung nodules are evident.  There is no evidence for pneumothorax or pleural effusions.  Bony structures appear intact.  There is no evidence for acute fracture or bone destruction.    There is a 9 mm cyst within the left lobe of the liver.  The liver appears mildly enlarged.  No other focal liver lesions are identified.  The gallbladder is present and appears grossly unremarkable.  There is no evidence for intrahepatic or extrahepatic biliary dilatation.  The portal venous system is patent.  The spleen, stomach, and pancreas appear unremarkable.  The adrenal glands are not enlarged.  The kidneys are normal in size, position, and contour and are noted to concentrate contrast symmetrically.  The abdominal aorta tapers normally without aneurysmal dilatation.  No para-aortic lymphadenopathy is identified.  The appendix is not confidently identified, however there are no CT findings to suggest appendicitis.  No dilated loops of bowel are evident.  The urinary bladder appears unremarkable.  There is no evidence for pelvic or inguinal lymphadenopathy.  There is a trace amount of ascites within the pelvis and at the caudal tip of the liver.                                       CT Head With Contrast (Final result)  Result time 07/14/23 04:47:23      Final result by Js Garrido MD (07/14/23 04:47:23)                   Impression:      The ventricular system and sulci appear prominent for age, this may relate to early involutional change, clinical and historical  correlation is needed.    There is no additional evidence for acute intracranial process.    Empty sella configuration.    Electronically signed by resident: Martha Hyde  Date:    07/14/2023  Time:    04:16    Electronically signed by: Js Garrido  Date:    07/14/2023  Time:    04:47               Narrative:    EXAMINATION:  CT HEAD WITH CONTRAST    CLINICAL HISTORY:  Meningitis/CNS infection suspected;Mental status change, unknown cause;    TECHNIQUE:  Low dose axial CT images obtained throughout the head before and after the IV administration of 100 mL of Omnipaque 350.  Sagittal and coronal reconstructions were performed.    COMPARISON:  None.    FINDINGS:  Intracranial compartment:    Prominence of the ventricles and sulci out of proportion for patient's age may relate to early involutional change, clinical and historical correlation is needed no extra-axial blood or fluid collections.    The brain parenchyma otherwise appears within normal limits normal.  No parenchymal mass, hemorrhage, edema or major vascular distribution infarct.  No abnormal enhancement.    Empty sella configuration.    Skull/extracranial contents (limited evaluation):    No fracture. Mastoid air cells and paranasal sinuses are essentially clear.                                       X-Ray Chest 1 View (Final result)  Result time 07/14/23 04:28:59      Final result by Js Garrido MD (07/14/23 04:28:59)                   Impression:      Mild diminished depth of inspiration and mild atelectatic change without evidence for superimposed acute intrathoracic process.      Electronically signed by: Js Garrido  Date:    07/14/2023  Time:    04:28               Narrative:    EXAMINATION:  XR CHEST 1 VIEW    CLINICAL HISTORY:  Hypoxemia    TECHNIQUE:  Single frontal view of the chest was performed.    COMPARISON:  None    FINDINGS:  Single chest view is submitted.  There is diminished depth of inspiration and mild rotation, when  accounting for these factors the cardiomediastinal silhouette appears appropriate.    Mild accentuation of pulmonary bronchovascular markings consistent with diminished depth of inspiration noted.  Mild atelectatic change noted.  There is no evidence for superimposed confluent infiltrate or consolidation, significant pleural effusion or pneumothorax.    The osseous structures appear intact.

## 2023-07-16 NOTE — ASSESSMENT & PLAN NOTE
Appears to be well controlled. Viral load undetectable, CD4 of 1061. Per pharmacy reconcilliation, only taking Symtuza outpatient.     Recommendations:  1. Continue Symtuza  2. Follow up with current provider at [facility]

## 2023-07-16 NOTE — SUBJECTIVE & OBJECTIVE
Interval History: Pt is responsive and able to have conversations. VS stable, reports the voices in his head have gone away this morning.     Review of Systems   Constitutional:  Positive for activity change and fatigue.   HENT: Negative.     Respiratory: Negative.     Cardiovascular: Negative.    Gastrointestinal: Negative.    Musculoskeletal: Negative.    Skin: Negative.    Neurological:  Positive for weakness.   Psychiatric/Behavioral:  Positive for decreased concentration.    Objective:     Vital Signs (Most Recent):  Temp: 98.7 °F (37.1 °C) (07/16/23 1604)  Pulse: 65 (07/16/23 1604)  Resp: 20 (07/16/23 1604)  BP: 113/67 (07/16/23 1604)  SpO2: 98 % (07/16/23 1604) Vital Signs (24h Range):  Temp:  [97.7 °F (36.5 °C)-98.9 °F (37.2 °C)] 98.7 °F (37.1 °C)  Pulse:  [65-83] 65  Resp:  [16-20] 20  SpO2:  [96 %-99 %] 98 %  BP: ()/(55-67) 113/67     Weight: 86.3 kg (190 lb 4.1 oz)  Body mass index is 25.1 kg/m².    Intake/Output Summary (Last 24 hours) at 7/16/2023 1632  Last data filed at 7/15/2023 2256  Gross per 24 hour   Intake 480 ml   Output --   Net 480 ml           Physical Exam  Constitutional:       Appearance: He is ill-appearing.   HENT:      Head: Normocephalic and atraumatic.      Nose: Nose normal.      Mouth/Throat:      Mouth: Mucous membranes are dry.   Eyes:      Pupils: Pupils are equal, round, and reactive to light.   Cardiovascular:      Rate and Rhythm: Normal rate and regular rhythm.      Pulses: Normal pulses.      Heart sounds: Normal heart sounds.   Pulmonary:      Effort: Pulmonary effort is normal.      Breath sounds: Normal breath sounds.   Abdominal:      General: Bowel sounds are normal.   Musculoskeletal:         General: Normal range of motion.      Cervical back: Normal range of motion.   Skin:     Coloration: Skin is pale.   Psychiatric:         Attention and Perception: He does not perceive auditory hallucinations.         Mood and Affect: Mood is not anxious.         Speech:  Speech is not slurred.         Behavior: Behavior is slowed.         Thought Content: Thought content is not paranoid.           Significant Labs: All pertinent labs within the past 24 hours have been reviewed.    Significant Imaging: I have reviewed all pertinent imaging results/findings within the past 24 hours.

## 2023-07-16 NOTE — ASSESSMENT & PLAN NOTE
Patient with hx of polysubstance abuse and bipolar disorder. Admitted with paranoid delusions and auditory hallucinations. PEC'd in ED due to grave disability. Psych following and plan for IP psych upon discharge.     -- completing benzo taper 7/16  -- scheduled Haldol 2mg PO TID  -- prn IV haldol

## 2023-07-16 NOTE — ASSESSMENT & PLAN NOTE
ID consulted for possible neurosyphilis, appreciate consult.  Clarification on timeline- per Care Everywhere, RPR 1:64 around 12/2020, then decreased to 1:8 on 10/2021, and now is 1:4.  Pt states he received PCN IM (in both buttocks) q week x 3 weeks; we need to clarify when and where this was given. CM to assist with outside records from [facility].     -- no indication to treat syphilis at this time pending CSF VDRL

## 2023-07-16 NOTE — PROGRESS NOTES
[facility] ([place] [address])  Infectious Disease  Progress Note    Patient Name: Flako Pearce  MRN: [id number]  Admission Date: 7/13/2023  Length of Stay: 2 days  Attending Physician: Whit Washington MD  Primary Care Provider: Primary Doctor No    Isolation Status: No active isolations  Assessment/Plan:      ID  Positive serological reaction for syphilis  History of syphilis infection in the past. Had titer of 1:8 in Dec 2022, currently 1:4. Patient reports being treated within the last few months. Possible that there has not been sufficient time since treatment to allow for the full 4-fold decrease in titer yet. LP performed with normal cell count. VDRL still pending. Unclear if patient's auditory hallucinations are related to syphilis vs undiagnosed psychiatric condition.     Recommendations:  1. Hold off on syphilis treatment at this time  2. If VDRL is negative, no need to treat for neurosyphilis with IV PCN  3. Recommend stopping vancomycin, acyclovir, and metronidazole  4. Can continue cefepime for now    HIV disease  Appears to be well controlled. Viral load undetectable, CD4 of 1061. Per pharmacy reconcilliation, only taking Symtuza outpatient.     Recommendations:  1. Continue Symtuza  2. Follow up with current provider at [facility]          Anticipated Disposition: TBD    Thank you for your consult. I will sign off. Please contact us if you have any additional questions.    Alicia Macario,   Infectious Disease  [facility] ([place] [address])    Subjective:     Principal Problem:Severe sepsis    HPI: A 33-year-old man with HIV, on HAART consisting of Prezcobix plus Descovy, past syphilis infection, seizure disorder, schizophrenia, and substance use who originally presented to [facility] ED with an acute psychosis.  While awaiting transfer to a psychiatric facility he became hypothermic, hypotensive, and bradycardic prompting admission to hospital medicine for further management.  He  underwent diagnostic lumbar puncture as well as empiric treatment with antivirals plus antibiotics.    Mr. Pearce reports he has been previously treated for syphilis with penicillin injections.  He reports outpatient laboratory workup performed through [facility] has been good.        Interval History:     Today patient states that he is feeling well, denies any complaints. Has been afebrile with normal WBC. Blood and CSF cultures with no growth to date. Fungal and AFB cultures still in process.     Review of Systems   Constitutional:  Negative for chills and fever.   HENT:  Negative for sore throat.    Respiratory:  Negative for cough and shortness of breath.    Cardiovascular:  Negative for chest pain, palpitations and leg swelling.   Gastrointestinal:  Negative for abdominal pain, constipation, diarrhea, nausea and vomiting.   Genitourinary:  Negative for dysuria and hematuria.   Musculoskeletal:  Negative for arthralgias and myalgias.   Skin:  Negative for rash.   Neurological:  Negative for weakness and headaches.   Objective:     Vital Signs (Most Recent):  Temp: 98.9 °F (37.2 °C) (07/16/23 1200)  Pulse: 83 (07/16/23 1200)  Resp: 20 (07/16/23 1200)  BP: (!) 106/57 (07/16/23 1200)  SpO2: 99 % (07/16/23 1200) Vital Signs (24h Range):  Temp:  [97.7 °F (36.5 °C)-98.9 °F (37.2 °C)] 98.9 °F (37.2 °C)  Pulse:  [68-83] 83  Resp:  [16-20] 20  SpO2:  [96 %-99 %] 99 %  BP: ()/(55-58) 106/57     Weight: 86.3 kg (190 lb 4.1 oz)  Body mass index is 25.1 kg/m².    Estimated Creatinine Clearance: 133.2 mL/min (based on SCr of 0.9 mg/dL).     Physical Exam  Vitals reviewed.   Constitutional:       General: He is not in acute distress.     Appearance: Normal appearance. He is not ill-appearing.   HENT:      Head: Normocephalic and atraumatic.   Eyes:      Extraocular Movements: Extraocular movements intact.      Conjunctiva/sclera: Conjunctivae normal.   Cardiovascular:      Rate and Rhythm: Normal rate and regular  rhythm.      Heart sounds: No murmur heard.  Pulmonary:      Effort: Pulmonary effort is normal. No respiratory distress.      Breath sounds: Normal breath sounds.   Abdominal:      General: Abdomen is flat. Bowel sounds are normal.      Palpations: Abdomen is soft.      Tenderness: There is no abdominal tenderness.   Musculoskeletal:      Cervical back: Normal range of motion.   Skin:     General: Skin is warm and dry.   Neurological:      Mental Status: He is alert and oriented to person, place, and time.   Psychiatric:         Mood and Affect: Mood normal.         Behavior: Behavior normal.        Significant Labs: All pertinent labs within the past 24 hours have been reviewed.  Recent Lab Results         07/16/23  0718   07/16/23  0641   07/15/23  1527        Albumin   3.2         Alkaline Phosphatase   61         ALT   13         Anion Gap   6         AST   10         Baso #   0.04         Basophil %   0.6         BILIRUBIN TOTAL   0.3  Comment: For infants and newborns, interpretation of results should be based  on gestational age, weight and in agreement with clinical  observations.    Premature Infant recommended reference ranges:  Up to 24 hours.............<8.0 mg/dL  Up to 48 hours............<12.0 mg/dL  3-5 days..................<15.0 mg/dL  6-29 days.................<15.0 mg/dL           BUN   4         Calcium   8.6         Chloride   106         CO2   26         Creatinine   0.9         Differential Method   Automated         eGFR   >60.0         Eos #   0.2         Eosinophil %   2.7         Glucose   112         Gran # (ANC)   3.7         Gran %   58.3         Hematocrit   39.1         Hemoglobin   13.1         Immature Grans (Abs)   0.01  Comment: Mild elevation in immature granulocytes is non specific and   can be seen in a variety of conditions including stress response,   acute inflammation, trauma and pregnancy. Correlation with other   laboratory and clinical findings is essential.            Immature Granulocytes   0.2         Lymph #   1.8         Lymph %   28.0         Magnesium   1.8         MCH   30.2         MCHC   33.5         MCV   90         Mono #   0.7         Mono %   10.2         MPV   9.8         nRBC   0         Phosphorus   2.8         Platelets   221         POCT Glucose 125           Potassium   3.6         PROTEIN TOTAL   5.9         RBC   4.34         RDW   13.3         Sodium   138         Vancomycin-Trough     10.4       WBC   6.36                 Significant Imaging: I have reviewed all pertinent imaging results/findings within the past 24 hours.

## 2023-07-16 NOTE — PROGRESS NOTES
[facility] ([address] Medicine  Progress Note    Patient Name: Flako Pearce  MRN: [id number]  Patient Class: IP- Inpatient   Admission Date: 7/13/2023  Length of Stay: 2 days  Attending Physician: Whit Washington MD  Primary Care Provider: Primary Doctor No        Subjective:     Principal Problem:Severe sepsis        HPI:  34 y/o M with PMH of HIV, seizure disorder, and schizophrenia presented with acute psychosis. Per ED personnel and records, pt was seen and medically cleared by previous team in [facility] ED, however while waiting for transfer to psych facility he became hypotensive, bradycardic, and hypothermic. Following this event, an extensive workup was performed including a additional sepsis evaluation, CT head, CT chest, CT abd/pelvis, lumbar puncture. He received fluids and empiric antibiotics and antivirals. He was also put on a rewarming device. BP improved s/p fluid resuscitation. He is not hypercapnic. Labs are still pending, but pt currently being treated for sepsis of unknown origin. Pt is still under PEC.    Following the initial events that incited full medical workup, my initial evaluation of the patient was difficult 2/2 pt somnolence. Pt would open his eyes briefly, but only after a light sternal rub. He would attempt to answer questions, but then quickly fell back asleep. I was unable to obtain any further/useful information from the patient regarding his condition.      Overview/Hospital Course:  Pt is a 34 yo male with hx of Bipolar disorder and polysubstance abuse (ketamine, pentobarbital) who presented to the ED with AMS and psychosis. Pt was on his way to be admitted to inpatient psych when he began to become unresponsive. Pt was PED'd at ED. Pt was hypotensive, bradycardic and hypothermic. ICU was consulted due to MAP below 65 and pt not responding. ICU evaluated the pt who did not meet ICU criteria. USD was remarkable for benzodiazepines and amphetamine. Lab  work remarkable for HIV+, RPR + and pending VDRL. ID consulted to rule out neurosyphilis.  Currently on symtuza and haldol. Pt reports taking xanax 6mg daily. Currently on valium taper for benzos withdrawal. Pt's mother contacted, she reports that the pt has no history of seizure disorder, but takes all types of hard drugs. Pt has been on rehab previously. Last episodes of acute psychosis where back in December and March. Was treated for Syphilis at [facility] on 4/2023. Pending CSF VDRL, if negative patient does not need further syphilis treatment and can be transferred to  psych.        Interval History: Pt is responsive and able to have conversations. VS stable, reports the voices in his head have gone away this morning.     Review of Systems   Constitutional:  Positive for activity change and fatigue.   HENT: Negative.     Respiratory: Negative.     Cardiovascular: Negative.    Gastrointestinal: Negative.    Musculoskeletal: Negative.    Skin: Negative.    Neurological:  Positive for weakness.   Psychiatric/Behavioral:  Positive for decreased concentration.    Objective:     Vital Signs (Most Recent):  Temp: 98.7 °F (37.1 °C) (07/16/23 1604)  Pulse: 65 (07/16/23 1604)  Resp: 20 (07/16/23 1604)  BP: 113/67 (07/16/23 1604)  SpO2: 98 % (07/16/23 1604) Vital Signs (24h Range):  Temp:  [97.7 °F (36.5 °C)-98.9 °F (37.2 °C)] 98.7 °F (37.1 °C)  Pulse:  [65-83] 65  Resp:  [16-20] 20  SpO2:  [96 %-99 %] 98 %  BP: ()/(55-67) 113/67     Weight: 86.3 kg (190 lb 4.1 oz)  Body mass index is 25.1 kg/m².    Intake/Output Summary (Last 24 hours) at 7/16/2023 1632  Last data filed at 7/15/2023 2256  Gross per 24 hour   Intake 480 ml   Output --   Net 480 ml           Physical Exam  Constitutional:       Appearance: He is ill-appearing.   HENT:      Head: Normocephalic and atraumatic.      Nose: Nose normal.      Mouth/Throat:      Mouth: Mucous membranes are dry.   Eyes:      Pupils: Pupils are equal, round, and  reactive to light.   Cardiovascular:      Rate and Rhythm: Normal rate and regular rhythm.      Pulses: Normal pulses.      Heart sounds: Normal heart sounds.   Pulmonary:      Effort: Pulmonary effort is normal.      Breath sounds: Normal breath sounds.   Abdominal:      General: Bowel sounds are normal.   Musculoskeletal:         General: Normal range of motion.      Cervical back: Normal range of motion.   Skin:     Coloration: Skin is pale.   Psychiatric:         Attention and Perception: He does not perceive auditory hallucinations.         Mood and Affect: Mood is not anxious.         Speech: Speech is not slurred.         Behavior: Behavior is slowed.         Thought Content: Thought content is not paranoid.           Significant Labs: All pertinent labs within the past 24 hours have been reviewed.    Significant Imaging: I have reviewed all pertinent imaging results/findings within the past 24 hours.      Assessment/Plan:      * Severe sepsis  Tapering off abx except cefepime given low suspicion for sepsis and negative BCx      Positive serological reaction for syphilis   ID consulted for possible neurosyphilis, appreciate consult.  Clarification on timeline- per Care Everywhere, RPR 1:64 around 12/2020, then decreased to 1:8 on 10/2021, and now is 1:4.  Pt states he received PCN IM (in both buttocks) q week x 3 weeks; we need to clarify when and where this was given. CM to assist with outside records from [facility].     -- no indication to treat syphilis at this time pending CSF VDRL      Psychosis  Patient with hx of polysubstance abuse and bipolar disorder. Admitted with paranoid delusions and auditory hallucinations. PEC'd in ED due to grave disability. Psych following and plan for IP psych upon discharge.     -- completing benzo taper 7/16  -- scheduled Haldol 2mg PO TID  -- prn IV haldol       Seizure disorder  -Hx of seizure disorder  - unknown if pt currently taking medication      HIV  disease  -- on admission, CD4 count 1061 and viral load undetectable. Patient taking Symtuza.  -- ID following. Will order patient's hoome Symtuza as it is nonformulary. Patient has it with him in his belongings.       VTE Risk Mitigation (From admission, onward)         Ordered     heparin (porcine) injection 5,000 Units  Every 8 hours         07/14/23 0725     IP VTE HIGH RISK PATIENT  Once         07/14/23 0725     Place sequential compression device  Until discontinued         07/14/23 0725                Discharge Planning   LORRIE:      Code Status: Full Code   Is the patient medically ready for discharge?: No    Reason for patient still in hospital (select all that apply): Treatment  Discharge Plan A: Psychiatric [facility]                  Kofi Isaac MD  Department of Hospital Medicine   [facility] - Intensive Care ([place]-[facility])

## 2023-07-16 NOTE — ASSESSMENT & PLAN NOTE
-- on admission, CD4 count 1061 and viral load undetectable. Patient taking Symtuza.  -- ID following. Will order patient's hoome Symtuza as it is nonformulary. Patient has it with him in his belongings.

## 2023-07-17 PROBLEM — F23 ACUTE PSYCHOSIS: Status: ACTIVE | Noted: 2023-03-19

## 2023-07-17 LAB
ALBUMIN SERPL BCP-MCNC: 3.5 G/DL (ref 3.5–5.2)
ALP SERPL-CCNC: 69 U/L (ref 55–135)
ALT SERPL W/O P-5'-P-CCNC: 13 U/L (ref 10–44)
ANION GAP SERPL CALC-SCNC: 8 MMOL/L (ref 8–16)
AST SERPL-CCNC: 12 U/L (ref 10–40)
BASOPHILS # BLD AUTO: 0.04 K/UL (ref 0–0.2)
BASOPHILS NFR BLD: 0.6 % (ref 0–1.9)
BILIRUB SERPL-MCNC: 0.4 MG/DL (ref 0.1–1)
BUN SERPL-MCNC: 8 MG/DL (ref 6–20)
CALCIUM SERPL-MCNC: 9 MG/DL (ref 8.7–10.5)
CHLORIDE SERPL-SCNC: 104 MMOL/L (ref 95–110)
CMV DNA SPEC QL NAA+PROBE: NOT DETECTED
CO2 SERPL-SCNC: 25 MMOL/L (ref 23–29)
CREAT SERPL-MCNC: 0.9 MG/DL (ref 0.5–1.4)
DIFFERENTIAL METHOD: NORMAL
EOSINOPHIL # BLD AUTO: 0.2 K/UL (ref 0–0.5)
EOSINOPHIL NFR BLD: 3.4 % (ref 0–8)
ERYTHROCYTE [DISTWIDTH] IN BLOOD BY AUTOMATED COUNT: 13.2 % (ref 11.5–14.5)
EST. GFR  (NO RACE VARIABLE): >60 ML/MIN/1.73 M^2
EV RNA SPEC QL NAA+PROBE: NEGATIVE
GLUCOSE SERPL-MCNC: 93 MG/DL (ref 70–110)
HCT VFR BLD AUTO: 42.8 % (ref 40–54)
HGB BLD-MCNC: 14.1 G/DL (ref 14–18)
IMM GRANULOCYTES # BLD AUTO: 0.01 K/UL (ref 0–0.04)
IMM GRANULOCYTES NFR BLD AUTO: 0.2 % (ref 0–0.5)
JCPYV DNA CSF QL NAA+PROBE: NEGATIVE
LYMPHOCYTES # BLD AUTO: 1.6 K/UL (ref 1–4.8)
LYMPHOCYTES NFR BLD: 24.8 % (ref 18–48)
MAGNESIUM SERPL-MCNC: 2 MG/DL (ref 1.6–2.6)
MCH RBC QN AUTO: 29.8 PG (ref 27–31)
MCHC RBC AUTO-ENTMCNC: 32.9 G/DL (ref 32–36)
MCV RBC AUTO: 91 FL (ref 82–98)
MONOCYTES # BLD AUTO: 0.7 K/UL (ref 0.3–1)
MONOCYTES NFR BLD: 11 % (ref 4–15)
NEUTROPHILS # BLD AUTO: 3.9 K/UL (ref 1.8–7.7)
NEUTROPHILS NFR BLD: 60 % (ref 38–73)
NRBC BLD-RTO: 0 /100 WBC
PHOSPHATE SERPL-MCNC: 2.8 MG/DL (ref 2.7–4.5)
PLATELET # BLD AUTO: 240 K/UL (ref 150–450)
PMV BLD AUTO: 9.8 FL (ref 9.2–12.9)
POCT GLUCOSE: 110 MG/DL (ref 70–110)
POTASSIUM SERPL-SCNC: 4.1 MMOL/L (ref 3.5–5.1)
PROT SERPL-MCNC: 6.6 G/DL (ref 6–8.4)
RBC # BLD AUTO: 4.73 M/UL (ref 4.6–6.2)
SODIUM SERPL-SCNC: 137 MMOL/L (ref 136–145)
SPECIMEN SOURCE: NORMAL
SPECIMEN SOURCE: NORMAL
T PALLIDUM AB SER QL IF: REACTIVE
VDRL CSF QL: NEGATIVE
WBC # BLD AUTO: 6.52 K/UL (ref 3.9–12.7)

## 2023-07-17 PROCEDURE — 25000003 PHARM REV CODE 250: Performed by: HOSPITALIST

## 2023-07-17 PROCEDURE — 36415 COLL VENOUS BLD VENIPUNCTURE: CPT

## 2023-07-17 PROCEDURE — 99232 SBSQ HOSP IP/OBS MODERATE 35: CPT | Mod: ,,, | Performed by: STUDENT IN AN ORGANIZED HEALTH CARE EDUCATION/TRAINING PROGRAM

## 2023-07-17 PROCEDURE — 85025 COMPLETE CBC W/AUTO DIFF WBC: CPT

## 2023-07-17 PROCEDURE — 84100 ASSAY OF PHOSPHORUS: CPT

## 2023-07-17 PROCEDURE — 63600175 PHARM REV CODE 636 W HCPCS

## 2023-07-17 PROCEDURE — 20600001 HC STEP DOWN PRIVATE ROOM

## 2023-07-17 PROCEDURE — 99232 PR SUBSEQUENT HOSPITAL CARE,LEVL II: ICD-10-PCS | Mod: AF,HB,, | Performed by: PSYCHIATRY & NEUROLOGY

## 2023-07-17 PROCEDURE — 99232 SBSQ HOSP IP/OBS MODERATE 35: CPT | Mod: AF,HB,, | Performed by: PSYCHIATRY & NEUROLOGY

## 2023-07-17 PROCEDURE — 25000003 PHARM REV CODE 250

## 2023-07-17 PROCEDURE — 99232 PR SUBSEQUENT HOSPITAL CARE,LEVL II: ICD-10-PCS | Mod: ,,, | Performed by: STUDENT IN AN ORGANIZED HEALTH CARE EDUCATION/TRAINING PROGRAM

## 2023-07-17 PROCEDURE — 80053 COMPREHEN METABOLIC PANEL: CPT

## 2023-07-17 PROCEDURE — 63600175 PHARM REV CODE 636 W HCPCS: Performed by: HOSPITALIST

## 2023-07-17 PROCEDURE — 83735 ASSAY OF MAGNESIUM: CPT

## 2023-07-17 RX ADMIN — MUPIROCIN: 20 OINTMENT TOPICAL at 08:07

## 2023-07-17 RX ADMIN — HEPARIN SODIUM 5000 UNITS: 5000 INJECTION INTRAVENOUS; SUBCUTANEOUS at 02:07

## 2023-07-17 RX ADMIN — DIAZEPAM 5 MG: 5 TABLET ORAL at 07:07

## 2023-07-17 RX ADMIN — LORAZEPAM 2 MG: 2 INJECTION INTRAMUSCULAR; INTRAVENOUS at 02:07

## 2023-07-17 RX ADMIN — MUPIROCIN: 20 OINTMENT TOPICAL at 07:07

## 2023-07-17 RX ADMIN — CEFEPIME 2 G: 2 INJECTION, POWDER, FOR SOLUTION INTRAVENOUS at 11:07

## 2023-07-17 RX ADMIN — CEFEPIME 2 G: 2 INJECTION, POWDER, FOR SOLUTION INTRAVENOUS at 07:07

## 2023-07-17 RX ADMIN — HALOPERIDOL 2 MG: 2 TABLET ORAL at 02:07

## 2023-07-17 RX ADMIN — HEPARIN SODIUM 5000 UNITS: 5000 INJECTION INTRAVENOUS; SUBCUTANEOUS at 08:07

## 2023-07-17 RX ADMIN — HALOPERIDOL 2 MG: 2 TABLET ORAL at 07:07

## 2023-07-17 RX ADMIN — CEFEPIME 2 G: 2 INJECTION, POWDER, FOR SOLUTION INTRAVENOUS at 03:07

## 2023-07-17 RX ADMIN — DARUNAVIR, COBICISTAT, EMTRICITABINE, AND TENOFOVIR ALAFENAMIDE 1 TABLET: 800; 150; 200; 10 TABLET, FILM COATED ORAL at 07:07

## 2023-07-17 RX ADMIN — HALOPERIDOL 2 MG: 2 TABLET ORAL at 08:07

## 2023-07-17 NOTE — NURSING
Patient is alert, oriented x 4, and ambulates to restroom. Patient calm, cooperative, and follows commands. Vitals stable this shift. No new events overnight. Sitter present for 1:1 monitoring. Blood cultures negative so Vanc, acyclovir, and flagyl no longer on board. Merrem being administered Q8.

## 2023-07-17 NOTE — ASSESSMENT & PLAN NOTE
ID consulted for possible neurosyphilis, appreciate consult.  Clarification on timeline- per Care Everywhere, RPR 1:64 around 12/2020, then decreased to 1:8 on 10/2021, and now is 1:4.  Pt states he received PCN IM (in both buttocks) q week x 3 weeks; we need to clarify when and where this was given. CM to assist with outside records from [facility].     -- no indication to treat syphilis at this time pending CSF VDRL

## 2023-07-17 NOTE — PLAN OF CARE
07/17/23 1436   Post-Acute Status   Post-Acute Authorization Placement   Discharge Delays None known at this time   Discharge Plan   Discharge Plan A Psychiatric hospital     Patient to DC to beg

## 2023-07-17 NOTE — NURSING
Patient is AAO*4  . Had  Bladder movement. Sitter present during day shift . Patient was agitated ,  PRN meds given .Patient was acceptance to care . Call light within reach ,safety precaution maintained , will continue monitoring.

## 2023-07-17 NOTE — SUBJECTIVE & OBJECTIVE
Interval History: NAEON.     Review of Systems   Constitutional:  Positive for diaphoresis. Negative for chills and fever.   Eyes:  Negative for visual disturbance.   Respiratory:  Negative for cough and shortness of breath.    Cardiovascular:  Negative for chest pain.   Gastrointestinal:  Negative for abdominal pain, diarrhea and nausea.   Genitourinary:  Negative for dysuria.   Neurological:  Negative for light-headedness and headaches.   Psychiatric/Behavioral:  Negative for agitation, confusion and hallucinations.    Objective:     Vital Signs (Most Recent):  Temp: 98.1 °F (36.7 °C) (07/17/23 1558)  Pulse: 77 (07/17/23 1558)  Resp: 20 (07/17/23 1159)  BP: (!) 98/50 (07/17/23 1558)  SpO2: 96 % (07/17/23 1558) Vital Signs (24h Range):  Temp:  [97.5 °F (36.4 °C)-98.8 °F (37.1 °C)] 98.1 °F (36.7 °C)  Pulse:  [54-85] 77  Resp:  [18-20] 20  SpO2:  [95 %-96 %] 96 %  BP: ()/(50-65) 98/50     Weight: 86.3 kg (190 lb 4.1 oz)  Body mass index is 25.1 kg/m².    Intake/Output Summary (Last 24 hours) at 7/17/2023 1748  Last data filed at 7/17/2023 1716  Gross per 24 hour   Intake 1580 ml   Output --   Net 1580 ml         Physical Exam  Vitals reviewed.   Constitutional:       General: He is not in acute distress.     Appearance: He is not ill-appearing.   HENT:      Head: Normocephalic and atraumatic.      Mouth/Throat:      Mouth: Mucous membranes are moist.      Pharynx: Oropharynx is clear.   Cardiovascular:      Rate and Rhythm: Normal rate and regular rhythm.   Pulmonary:      Effort: Pulmonary effort is normal. No respiratory distress.      Breath sounds: Normal breath sounds.   Abdominal:      General: Bowel sounds are normal. There is no distension.      Palpations: Abdomen is soft.      Tenderness: There is no abdominal tenderness.   Musculoskeletal:      Right lower leg: No edema.      Left lower leg: No edema.   Skin:     General: Skin is warm.      Findings: No bruising or erythema.   Neurological:       Mental Status: He is alert and oriented to person, place, and time. Mental status is at baseline.   Psychiatric:         Mood and Affect: Mood normal.         Behavior: Behavior normal.           Significant Labs: All pertinent labs within the past 24 hours have been reviewed.    Significant Imaging: I have reviewed all pertinent imaging results/findings within the past 24 hours.

## 2023-07-17 NOTE — PROGRESS NOTES
[facility] - Intensive Care ([address] Medicine  Progress Note    Patient Name: Flako Pearce  MRN: [id number]  Patient Class: IP- Inpatient   Admission Date: 7/13/2023  Length of Stay: 3 days  Attending Physician: Jose Rose MD  Primary Care Provider: Primary Doctor No        Subjective:     Principal Problem:Severe sepsis        HPI:  34 y/o M with PMH of HIV, seizure disorder, and schizophrenia presented with acute psychosis. Per ED personnel and records, pt was seen and medically cleared by previous team in [facility] ED, however while waiting for transfer to psych facility he became hypotensive, bradycardic, and hypothermic. Following this event, an extensive workup was performed including a additional sepsis evaluation, CT head, CT chest, CT abd/pelvis, lumbar puncture. He received fluids and empiric antibiotics and antivirals. He was also put on a rewarming device. BP improved s/p fluid resuscitation. He is not hypercapnic. Labs are still pending, but pt currently being treated for sepsis of unknown origin. Pt is still under PEC.    Following the initial events that incited full medical workup, my initial evaluation of the patient was difficult 2/2 pt somnolence. Pt would open his eyes briefly, but only after a light sternal rub. He would attempt to answer questions, but then quickly fell back asleep. I was unable to obtain any further/useful information from the patient regarding his condition.      Overview/Hospital Course:  Pt is a 34 yo male with hx of Bipolar disorder and polysubstance abuse (ketamine, pentobarbital) who presented to the ED with AMS and psychosis. Pt was on his way to be admitted to inpatient psych when he began to become unresponsive. Pt was PED'd at ED. Pt was hypotensive, bradycardic and hypothermic. ICU was consulted due to MAP below 65 and pt not responding. ICU evaluated the pt who did not meet ICU criteria. USD was remarkable for benzodiazepines and  amphetamine. Lab work remarkable for HIV+, RPR + and pending VDRL. ID consulted to rule out neurosyphilis.  Currently on symtuza and haldol. Pt reports taking xanax 6mg daily. Currently on valium taper for benzos withdrawal. Pt's mother contacted, she reports that the pt has no history of seizure disorder, but takes all types of hard drugs. Pt has been on rehab previously. Last episodes of acute psychosis where back in December and March. Was treated for Syphilis at [facility] on 4/2023. Pending CSF VDRL, if negative patient does not need further syphilis treatment and can be transferred to  psych.        Interval History: NAEON.     Review of Systems   Constitutional:  Positive for diaphoresis. Negative for chills and fever.   Eyes:  Negative for visual disturbance.   Respiratory:  Negative for cough and shortness of breath.    Cardiovascular:  Negative for chest pain.   Gastrointestinal:  Negative for abdominal pain, diarrhea and nausea.   Genitourinary:  Negative for dysuria.   Neurological:  Negative for light-headedness and headaches.   Psychiatric/Behavioral:  Negative for agitation, confusion and hallucinations.    Objective:     Vital Signs (Most Recent):  Temp: 98.1 °F (36.7 °C) (07/17/23 1558)  Pulse: 77 (07/17/23 1558)  Resp: 20 (07/17/23 1159)  BP: (!) 98/50 (07/17/23 1558)  SpO2: 96 % (07/17/23 1558) Vital Signs (24h Range):  Temp:  [97.5 °F (36.4 °C)-98.8 °F (37.1 °C)] 98.1 °F (36.7 °C)  Pulse:  [54-85] 77  Resp:  [18-20] 20  SpO2:  [95 %-96 %] 96 %  BP: ()/(50-65) 98/50     Weight: 86.3 kg (190 lb 4.1 oz)  Body mass index is 25.1 kg/m².    Intake/Output Summary (Last 24 hours) at 7/17/2023 1748  Last data filed at 7/17/2023 1716  Gross per 24 hour   Intake 1580 ml   Output --   Net 1580 ml         Physical Exam  Vitals reviewed.   Constitutional:       General: He is not in acute distress.     Appearance: He is not ill-appearing.   HENT:      Head: Normocephalic and atraumatic.       Mouth/Throat:      Mouth: Mucous membranes are moist.      Pharynx: Oropharynx is clear.   Cardiovascular:      Rate and Rhythm: Normal rate and regular rhythm.   Pulmonary:      Effort: Pulmonary effort is normal. No respiratory distress.      Breath sounds: Normal breath sounds.   Abdominal:      General: Bowel sounds are normal. There is no distension.      Palpations: Abdomen is soft.      Tenderness: There is no abdominal tenderness.   Musculoskeletal:      Right lower leg: No edema.      Left lower leg: No edema.   Skin:     General: Skin is warm.      Findings: No bruising or erythema.   Neurological:      Mental Status: He is alert and oriented to person, place, and time. Mental status is at baseline.   Psychiatric:         Mood and Affect: Mood normal.         Behavior: Behavior normal.           Significant Labs: All pertinent labs within the past 24 hours have been reviewed.    Significant Imaging: I have reviewed all pertinent imaging results/findings within the past 24 hours.      Assessment/Plan:      * Severe sepsis  Tapering off abx except cefepime given low suspicion for sepsis and negative BCx      Positive serological reaction for syphilis   ID consulted for possible neurosyphilis, appreciate consult.  Clarification on timeline- per Care Everywhere, RPR 1:64 around 12/2020, then decreased to 1:8 on 10/2021, and now is 1:4.  Pt states he received PCN IM (in both buttocks) q week x 3 weeks; we need to clarify when and where this was given. CM to assist with outside records from [facility].     -- no indication to treat syphilis at this time pending CSF VDRL      Acute psychosis  Patient with hx of polysubstance abuse and bipolar disorder. Admitted with paranoid delusions and auditory hallucinations. PEC'd in ED due to grave disability. Psych following and plan for IP psych upon discharge.     -- completing benzo taper 7/16  -- scheduled Haldol 2mg PO TID  -- prn IV haldol   -- psych following,  appreciate recs      Seizure disorder  -Hx of seizure disorder  - unknown if pt currently taking medication, continue to hold      HIV disease  -- on admission, CD4 count 1061 and viral load undetectable. Patient taking Symtuza.  -- ID following. Will order patient's hoome Symtuza as it is nonformulary. Patient has it with him in his belongings.       VTE Risk Mitigation (From admission, onward)         Ordered     heparin (porcine) injection 5,000 Units  Every 8 hours         07/14/23 0725     IP VTE HIGH RISK PATIENT  Once         07/14/23 0725     Place sequential compression device  Until discontinued         07/14/23 0725                Discharge Planning   LORRIE: 7/20/2023     Code Status: Full Code   Is the patient medically ready for discharge?: No    Reason for patient still in hospital (select all that apply): Patient trending condition and Laboratory test  Discharge Plan A: Psychiatric hospital   Discharge Delays: None known at this time              Lisset Sandra MD  Department of Hospital Medicine   [facility] - Intensive Care ([place]-)

## 2023-07-17 NOTE — ASSESSMENT & PLAN NOTE
Patient with hx of polysubstance abuse and bipolar disorder. Admitted with paranoid delusions and auditory hallucinations. PEC'd in ED due to grave disability. Psych following and plan for IP psych upon discharge.     -- completing benzo taper 7/16  -- scheduled Haldol 2mg PO TID  -- prn IV haldol   -- psych following, appreciate recs

## 2023-07-17 NOTE — PLAN OF CARE
Pt dx severe sepsis. AAOX4. Calm, cooperative. Stable. CEC, sitter at bedside, continual visual contact in progress. NAD. Explained plan of care, voiced understanding. Safety maintained.

## 2023-07-17 NOTE — PROGRESS NOTES
CONSULTATION LIAISON PSYCHIATRY PROGRESS NOTE    Patient Name: Flako Pearce  MRN: [id number]  Patient Class: IP- Inpatient  Admission Date: 7/13/2023  Attending Physician: Jose Rose MD      SUBJECTIVE:   Flako Pearce is a 33 y.o. male with past psychiatric history of bipolar I disorder, SIPD, amphetamine use disorder, sedative, hypnotic, or anxiolytic (benzodiazepine) use disorder, and anxiety & past pertinent medical history of seizures, latent syphyllis treated on 07/2022, HIV on descovy and prezcobix, presents to the ED/admitted to the hospital for Severe sepsis. UDS+ amphetamines, benzodiazepines on admission)    Per Primary Team:  Per ED, pt medically cleared by previous team in [facility] ED, however while waiting for transfer to psych facility he became hypotensive, bradycardic, and hypothermic. Following this event, an extensive workup was performed including a additional sepsis evaluation, CT head, CT chest, CT abd/pelvis, lumbar puncture. IVFs, ABx, acyclovir. Yosef Guerra. BP improved s/p fluid resuscitation then dropped again. ICU was consulted due to MAP below 65 and pt not responding. ICU evaluated the pt who did not meet ICU criteria. Txed for sepsis of unknown origin... UDS was remarkable for benzos and amphetamines. Lab work remarkable for HIV+, RPR + and pending VDRL. ID consulted to rule out neurosyphilis.  CD4 count returned as >1000.  Home meds (reconciled by pharmacy [facility], see note in Epic): symtuza (for HIV) and haldol 50mg IM depot shot qmonth. We started valium taper for benzos withdrawal, and haldol 2mg PO BID per Psych. Pt's mother contacted, confirmed pt employed at [email or web address] as , pt in rehab previously off an on x 15 yrs, longest sobriety 6 months at [facility]. Last episodes of acute psychosis where back in December and March. Was treated for Syphilis at [facility] on 4/2023.     Await CFS VDRL, needs to remain inpatient  "until result because this would change mgmt and would need IV PCN.  Would call send-out lab first thing tomorrow to find out expected turnaround time.    Psychiatry consulted for PEC for grave disability / psychosis    Pt seen and chart reviewed. Per nursing:   Patient is alert, oriented x 4, and ambulates to restroom. Patient calm, cooperative, and follows commands. Vitals stable this shift. No new events overnight. Sitter present for 1:1 monitoring. Blood cultures negative so Vanc, acyclovir, and flagyl no longer on board. Merrem being administered Q8.  Vitals: SBP: 101-121, DBP: 57-67, HR: 54-85  Pt compliant with home Haldol 2mg TID. Has standing Valium 5mg BID. Got PRN IV Ativan 2mg at time 1304 yesterday, unclear if for withdrawal or non-redirectable agitation.   On interview, pt was noted to be sleeping, in NAD; was arousable to voice. Drowsy, but oriented x 4 and answering questions appropriately. Endorsed feeling "a little clammy and shaky" when asked about any withdrawal sx, but stated that overall he feels ok, and better than yesterday. Stated that he was able to sleep last night and had some breakfast this morning. Denied SI/HI/AVH. Stated that he'd like go home as soon as possible; worried about continuing to miss work while hospitalized. Was able to state that the results of his LP are currently pending, and he needs to stay in the hospital at least until the results are available.   Of note, pt voiced + AH yesterday, but is denying today. No subjective signs of hallucinations on interview.      OBJECTIVE:    Mental Status Exam:  General Appearance: appears stated age, dressed in hospital garb, poorly groomed  Behavior: cooperative, poor eye contact  Involuntary Movements and Motor Activity: no abnormal involuntary movements noted  Gait and Station: unable to assess - patient lying down or seated  Speech and Language: normal rate, normal rhythm, normal volume, soft  Mood: "OK"  Affect: flat  Thought " Process and Associations: linear, goal-directed, organized  Thought Content and Perceptions:: no suicidal or homicidal ideation, no auditory or visual hallucinations, no paranoid ideation, no ideas of reference, no evidence of delusions or psychosis  Sensorium and Orientation: grossly intact  Recent and Remote Memory: grossly intact  Attention and Concentration: grossly intact  Fund of Knowledge: grossly intact, vocabulary appropriate  Insight: limited/partial awareness of illness  Judgment: adequate    CAM ICU positive? no      ASSESSMENT & RECOMMENDATIONS   Psychosis, not otherwise specified vs substance-induced psychotic disorder  Polysubstance use disorder (alcohol, stimulants, benzodiazepines, ketamine    Psychosis NOS (suspect SIPD)  PSYCH MEDICATIONS  Continue home Haldol 2 mg TID  PRN Haldol 5 mg PO/IM and Ativan 2 mg PO/IM q6h for non-redirectable agitation  Monitor QTc (QTc 7/14/23: 421 ms)     Sedative, hypnotic, or anxiolytic (benzodiazepine) use disorder  PSYCH MEDICATIONS  Would recommend continuing benzodiazepine taper per primary team (currently has standing Valium 5 mg q12h)  Monitor for s/sx of benzodiazepine withdrawal and utilize Ativan PRN for breakthrough symptoms per CIWA protocol  Ativan 2mg PO/IM PRN with withdrawal parameters for any two of the following: SBP>160, DBP>100, HR>100, tremor, or diaphoresis       OTHER PERTINENT DIAGNOSIS  Amphetamine use disorder  Polysubstance abuse (benzos, stimulants, Ketamine, pentobarbital)     RISK ASSESSMENT  Continue PEC/CEC for grave disability & NEEDS 1:1 sitter  (Recommend continuing PEC/CEC for now, but as noted, pt appears to be improving; will continue to reassess and may be able to rescind PEC/CEC in near future).     FOLLOW UP  Will follow up while in house     DISPOSITION - once medically cleared:   Seek involuntary inpatient psychiatric admission for stabilization of acute psychiatric symptoms and a safe disposition plan is enacted. The  patient &/or their family was informed that the patient will be transferred to an inpatient unit per ED/primary placement team.        Please contact ON CALL psychiatry service (24/7) for any acute issues that may arise.    Dr. Lindy CULLEN Psychiatry, PGY-II  [facility]  7/17/2023 1:11 PM        --------------------------------------------------------------------------------------------------------------------------------------------------------------------------------------------------------------------------------------    CONTINUED OBJECTIVE clinical data & findings reviewed and noted for above decision making    Current Medications:   Scheduled Meds:    ceFEPime (MAXIPIME) IVPB  2 g Intravenous Q8H    darunavir-hank-emtri-tenof ala  1 tablet Oral Daily with breakfast    haloperidoL  2 mg Oral TID    heparin (porcine)  5,000 Units Subcutaneous Q8H    mupirocin   Nasal BID     PRN Meds: acetaminophen, dextrose 10%, dextrose 10%, dextrose, dextrose, glucagon (human recombinant), haloperidol lactate, lorazepam, naloxone, ondansetron, sodium chloride 0.9%    Allergies:   Review of patient's allergies indicates:  No Known Allergies    Vitals  Vitals:    07/17/23 0702   BP: 109/65   Pulse: (!) 54   Resp: 20   Temp: 98.7 °F (37.1 °C)       Labs/Imaging/Studies:  Recent Results (from the past 24 hour(s))   POCT glucose    Collection Time: 07/16/23  1:13 PM   Result Value Ref Range    POCT Glucose 85 70 - 110 mg/dL   CBC Auto Differential    Collection Time: 07/17/23  7:10 AM   Result Value Ref Range    WBC 6.52 3.90 - 12.70 K/uL    RBC 4.73 4.60 - 6.20 M/uL    Hemoglobin 14.1 14.0 - 18.0 g/dL    Hematocrit 42.8 40.0 - 54.0 %    MCV 91 82 - 98 fL    MCH 29.8 27.0 - 31.0 pg    MCHC 32.9 32.0 - 36.0 g/dL    RDW 13.2 11.5 - 14.5 %    Platelets 240 150 - 450 K/uL    MPV 9.8 9.2 - 12.9 fL    Immature Granulocytes 0.2 0.0 - 0.5 %    Gran # (ANC) 3.9 1.8 - 7.7 K/uL    Immature Grans (Abs)  0.01 0.00 - 0.04 K/uL    Lymph # 1.6 1.0 - 4.8 K/uL    Mono # 0.7 0.3 - 1.0 K/uL    Eos # 0.2 0.0 - 0.5 K/uL    Baso # 0.04 0.00 - 0.20 K/uL    nRBC 0 0 /100 WBC    Gran % 60.0 38.0 - 73.0 %    Lymph % 24.8 18.0 - 48.0 %    Mono % 11.0 4.0 - 15.0 %    Eosinophil % 3.4 0.0 - 8.0 %    Basophil % 0.6 0.0 - 1.9 %    Differential Method Automated    Comprehensive Metabolic Panel    Collection Time: 07/17/23  7:10 AM   Result Value Ref Range    Sodium 137 136 - 145 mmol/L    Potassium 4.1 3.5 - 5.1 mmol/L    Chloride 104 95 - 110 mmol/L    CO2 25 23 - 29 mmol/L    Glucose 93 70 - 110 mg/dL    BUN 8 6 - 20 mg/dL    Creatinine 0.9 0.5 - 1.4 mg/dL    Calcium 9.0 8.7 - 10.5 mg/dL    Total Protein 6.6 6.0 - 8.4 g/dL    Albumin 3.5 3.5 - 5.2 g/dL    Total Bilirubin 0.4 0.1 - 1.0 mg/dL    Alkaline Phosphatase 69 55 - 135 U/L    AST 12 10 - 40 U/L    ALT 13 10 - 44 U/L    eGFR >60.0 >60 mL/min/1.73 m^2    Anion Gap 8 8 - 16 mmol/L   Magnesium    Collection Time: 07/17/23  7:10 AM   Result Value Ref Range    Magnesium 2.0 1.6 - 2.6 mg/dL   Phosphorus    Collection Time: 07/17/23  7:10 AM   Result Value Ref Range    Phosphorus 2.8 2.7 - 4.5 mg/dL   POCT glucose    Collection Time: 07/17/23  7:32 AM   Result Value Ref Range    POCT Glucose 110 70 - 110 mg/dL     Imaging Results              CT Chest Abdomen Pelvis With Contrast (xpd) (Final result)  Result time 07/14/23 10:59:18   Procedure changed from CT Abdomen Pelvis With Contrast     Final result by Gigi Underwood MD (07/14/23 10:59:18)                   Impression:      Subcentimeter left lobe hepatic cyst.    Mild hepatomegaly.    Trace ascites.      Electronically signed by: Gigi Underwood MD  Date:    07/14/2023  Time:    10:59               Narrative:    EXAMINATION:  CT CHEST ABDOMEN PELVIS WITH CONTRAST (XPD)    CLINICAL HISTORY:  Sepsis;    TECHNIQUE:  Low dose axial images, sagittal and coronal reformations were obtained from the thoracic inlet to the pubic symphysis  following the IV administration of 100 mL of Omnipaque 350.  No oral contrast was administered.    COMPARISON:  None    FINDINGS:  Soft tissue structures at the base of the neck appear unremarkable.  The heart is not enlarged.  The thoracic aorta is normal in caliber without evidence for aneurysmal dilatation.  No hilar or mediastinal adenopathy is identified.  No axillary adenopathy is identified.  There is some respiratory motion.  There are mild dependent atelectatic changes within the lung bases.  No lung suspicious lung nodules are evident.  There is no evidence for pneumothorax or pleural effusions.  Bony structures appear intact.  There is no evidence for acute fracture or bone destruction.    There is a 9 mm cyst within the left lobe of the liver.  The liver appears mildly enlarged.  No other focal liver lesions are identified.  The gallbladder is present and appears grossly unremarkable.  There is no evidence for intrahepatic or extrahepatic biliary dilatation.  The portal venous system is patent.  The spleen, stomach, and pancreas appear unremarkable.  The adrenal glands are not enlarged.  The kidneys are normal in size, position, and contour and are noted to concentrate contrast symmetrically.  The abdominal aorta tapers normally without aneurysmal dilatation.  No para-aortic lymphadenopathy is identified.  The appendix is not confidently identified, however there are no CT findings to suggest appendicitis.  No dilated loops of bowel are evident.  The urinary bladder appears unremarkable.  There is no evidence for pelvic or inguinal lymphadenopathy.  There is a trace amount of ascites within the pelvis and at the caudal tip of the liver.                                       CT Head With Contrast (Final result)  Result time 07/14/23 04:47:23      Final result by Js Garrido MD (07/14/23 04:47:23)                   Impression:      The ventricular system and sulci appear prominent for age, this may  relate to early involutional change, clinical and historical correlation is needed.    There is no additional evidence for acute intracranial process.    Empty sella configuration.    Electronically signed by resident: Martha Hyde  Date:    07/14/2023  Time:    04:16    Electronically signed by: Js Garrido  Date:    07/14/2023  Time:    04:47               Narrative:    EXAMINATION:  CT HEAD WITH CONTRAST    CLINICAL HISTORY:  Meningitis/CNS infection suspected;Mental status change, unknown cause;    TECHNIQUE:  Low dose axial CT images obtained throughout the head before and after the IV administration of 100 mL of Omnipaque 350.  Sagittal and coronal reconstructions were performed.    COMPARISON:  None.    FINDINGS:  Intracranial compartment:    Prominence of the ventricles and sulci out of proportion for patient's age may relate to early involutional change, clinical and historical correlation is needed no extra-axial blood or fluid collections.    The brain parenchyma otherwise appears within normal limits normal.  No parenchymal mass, hemorrhage, edema or major vascular distribution infarct.  No abnormal enhancement.    Empty sella configuration.    Skull/extracranial contents (limited evaluation):    No fracture. Mastoid air cells and paranasal sinuses are essentially clear.                                       X-Ray Chest 1 View (Final result)  Result time 07/14/23 04:28:59      Final result by Js Garrido MD (07/14/23 04:28:59)                   Impression:      Mild diminished depth of inspiration and mild atelectatic change without evidence for superimposed acute intrathoracic process.      Electronically signed by: Js Garrido  Date:    07/14/2023  Time:    04:28               Narrative:    EXAMINATION:  XR CHEST 1 VIEW    CLINICAL HISTORY:  Hypoxemia    TECHNIQUE:  Single frontal view of the chest was performed.    COMPARISON:  None    FINDINGS:  Single chest view is submitted.  There  is diminished depth of inspiration and mild rotation, when accounting for these factors the cardiomediastinal silhouette appears appropriate.    Mild accentuation of pulmonary bronchovascular markings consistent with diminished depth of inspiration noted.  Mild atelectatic change noted.  There is no evidence for superimposed confluent infiltrate or consolidation, significant pleural effusion or pneumothorax.    The osseous structures appear intact.

## 2023-07-17 NOTE — PLAN OF CARE
Resting without c/o. Calm, cooperative. Pt experienced panic attack today with resolved after ativan administration. CEC in effect, sitter at bedside. Safety maintained.

## 2023-07-18 PROBLEM — R56.9 SEIZURE: Status: ACTIVE | Noted: 2023-03-19

## 2023-07-18 PROBLEM — R83.5: Status: ACTIVE | Noted: 2023-07-18

## 2023-07-18 LAB — POCT GLUCOSE: 125 MG/DL (ref 70–110)

## 2023-07-18 PROCEDURE — 25000003 PHARM REV CODE 250: Performed by: HOSPITALIST

## 2023-07-18 PROCEDURE — 25000003 PHARM REV CODE 250

## 2023-07-18 PROCEDURE — 99233 SBSQ HOSP IP/OBS HIGH 50: CPT | Mod: AF,HB,, | Performed by: PSYCHIATRY & NEUROLOGY

## 2023-07-18 PROCEDURE — 99232 SBSQ HOSP IP/OBS MODERATE 35: CPT | Mod: ,,, | Performed by: STUDENT IN AN ORGANIZED HEALTH CARE EDUCATION/TRAINING PROGRAM

## 2023-07-18 PROCEDURE — 63600175 PHARM REV CODE 636 W HCPCS: Performed by: HOSPITALIST

## 2023-07-18 PROCEDURE — 99233 PR SUBSEQUENT HOSPITAL CARE,LEVL III: ICD-10-PCS | Mod: AF,HB,, | Performed by: PSYCHIATRY & NEUROLOGY

## 2023-07-18 PROCEDURE — 99232 PR SUBSEQUENT HOSPITAL CARE,LEVL II: ICD-10-PCS | Mod: ,,, | Performed by: STUDENT IN AN ORGANIZED HEALTH CARE EDUCATION/TRAINING PROGRAM

## 2023-07-18 PROCEDURE — 20600001 HC STEP DOWN PRIVATE ROOM

## 2023-07-18 PROCEDURE — 63600175 PHARM REV CODE 636 W HCPCS

## 2023-07-18 RX ADMIN — DARUNAVIR, COBICISTAT, EMTRICITABINE, AND TENOFOVIR ALAFENAMIDE 1 TABLET: 800; 150; 200; 10 TABLET, FILM COATED ORAL at 08:07

## 2023-07-18 RX ADMIN — HALOPERIDOL 2 MG: 2 TABLET ORAL at 08:07

## 2023-07-18 RX ADMIN — LORAZEPAM 2 MG: 2 INJECTION INTRAMUSCULAR; INTRAVENOUS at 07:07

## 2023-07-18 RX ADMIN — CEFEPIME 2 G: 2 INJECTION, POWDER, FOR SOLUTION INTRAVENOUS at 07:07

## 2023-07-18 RX ADMIN — MUPIROCIN: 20 OINTMENT TOPICAL at 08:07

## 2023-07-18 RX ADMIN — LORAZEPAM 2 MG: 2 INJECTION INTRAMUSCULAR; INTRAVENOUS at 03:07

## 2023-07-18 RX ADMIN — HEPARIN SODIUM 5000 UNITS: 5000 INJECTION INTRAVENOUS; SUBCUTANEOUS at 08:07

## 2023-07-18 RX ADMIN — HEPARIN SODIUM 5000 UNITS: 5000 INJECTION INTRAVENOUS; SUBCUTANEOUS at 05:07

## 2023-07-18 RX ADMIN — CEFEPIME 2 G: 2 INJECTION, POWDER, FOR SOLUTION INTRAVENOUS at 11:07

## 2023-07-18 RX ADMIN — CEFEPIME 2 G: 2 INJECTION, POWDER, FOR SOLUTION INTRAVENOUS at 02:07

## 2023-07-18 RX ADMIN — HALOPERIDOL 2 MG: 2 TABLET ORAL at 03:07

## 2023-07-18 NOTE — ASSESSMENT & PLAN NOTE
Patient with hx of polysubstance abuse and bipolar disorder. Admitted with paranoid delusions and auditory hallucinations. PEC'd in ED due to grave disability. Psych following and initially planed for IP psych upon discharge, however CEC rescinded on 7/18.     -- completed benzo taper 7/16  -- scheduled Haldol 2mg PO TID  -- prn IV haldol   -- psych following, appreciate recs

## 2023-07-18 NOTE — NURSING
Assumed care of pt S/P report from day shift VILMA Will. Pt is sleeping in bed, easily arousal, VSS. Pt is cooperative at this time, denies complaints/needs at this time. PCT Diane at bedside for 1:1 sitter, bed in lowest position, belongings within reach, WCTM.

## 2023-07-18 NOTE — ASSESSMENT & PLAN NOTE
CSF fluid cultures growing gram positive rods. Awaiting speciation to determine if need to treat or likely contaminant.

## 2023-07-18 NOTE — PLAN OF CARE
Pt dx severe sepsis. Asleep, easily arousable. Calm, cooperative. VS stable. CEC in effect. Pt refused AM labs, explained plan of care and importance of compliance with care. Reinforcement needed. PIV 20g to LAC and RFA intact, secured and patent. Sitter at bedside.

## 2023-07-18 NOTE — PROGRESS NOTES
[facility] - Intensive [facility] ([address] Medicine  Progress Note    Patient Name: Flako Pearce  MRN: [id number]  Patient Class: IP- Inpatient   Admission Date: 7/13/2023  Length of Stay: 4 days  Attending Physician: Jose Rose MD  Primary Care Provider: Primary Doctor No        Subjective:     Principal Problem:Severe sepsis        HPI:  34 y/o M with PMH of HIV, seizure disorder, and schizophrenia presented with acute psychosis. Per ED personnel and records, pt was seen and medically cleared by previous team in [facility] ED, however while waiting for transfer to psych facility he became hypotensive, bradycardic, and hypothermic. Following this event, an extensive workup was performed including a additional sepsis evaluation, CT head, CT chest, CT abd/pelvis, lumbar puncture. He received fluids and empiric antibiotics and antivirals. He was also put on a rewarming device. BP improved s/p fluid resuscitation. He is not hypercapnic. Labs are still pending, but pt currently being treated for sepsis of unknown origin. Pt is still under PEC.    Following the initial events that incited full medical workup, my initial evaluation of the patient was difficult 2/2 pt somnolence. Pt would open his eyes briefly, but only after a light sternal rub. He would attempt to answer questions, but then quickly fell back asleep. I was unable to obtain any further/useful information from the patient regarding his condition.      Overview/Hospital Course:  Pt is a 32 yo male with hx of Bipolar disorder and polysubstance abuse (ketamine, pentobarbital) who presented to the ED with AMS and psychosis. Pt was on his way to be admitted to inpatient psych when he began to become unresponsive. Pt was PED'd at ED. Pt was hypotensive, bradycardic and hypothermic. ICU was consulted due to MAP below 65 and pt not responding. ICU evaluated the pt who did not meet ICU criteria. USD was remarkable for benzodiazepines and  amphetamine. Lab work remarkable for HIV+, RPR + and pending VDRL. ID consulted to rule out neurosyphilis.  Currently on symtuza and haldol. Pt reports taking xanax 6mg daily. Currently on valium taper for benzos withdrawal. Pt's mother contacted, she reports that the pt has no history of seizure disorder, but takes all types of hard drugs. Pt has been on rehab previously. Last episodes of acute psychosis where back in December and March. Was treated for Syphilis at [facility] on 4/2023. CSF VDRL negative, so patient does not need further syphilis treatment. CEC rescinded on 7/18 as patient no longer gravely disabled. CSF fluid growing gram positive rods, awaiting cultures to determine if treatment required or more likely a contaminant.      Interval History: NAEON. CSF VDRL negative, so patient does not need further syphilis treatment. CEC rescinded on 7/18 as patient no longer gravely disabled. CSF fluid growing gram positive rods, awaiting cultures to determine if treatment required or more likely a contaminant. Patient is eager to get home to resume work.    Review of Systems   Constitutional:  Negative for appetite change, chills, diaphoresis and fever.   Eyes:  Negative for visual disturbance.   Respiratory:  Negative for cough and shortness of breath.    Cardiovascular:  Negative for chest pain.   Gastrointestinal:  Negative for abdominal pain, diarrhea and nausea.   Genitourinary:  Negative for dysuria and hematuria.   Neurological:  Positive for headaches. Negative for light-headedness.   Objective:     Vital Signs (Most Recent):  Temp: 98 °F (36.7 °C) (07/18/23 1206)  Pulse: 71 (07/18/23 1206)  Resp: 20 (07/18/23 1206)  BP: (!) 95/51 (07/18/23 1206)  SpO2: 97 % (07/18/23 1206) Vital Signs (24h Range):  Temp:  [97.4 °F (36.3 °C)-98.3 °F (36.8 °C)] 98 °F (36.7 °C)  Pulse:  [65-90] 71  Resp:  [18-20] 20  SpO2:  [93 %-97 %] 97 %  BP: ()/(50-59) 95/51     Weight: 86.3 kg (190 lb 4.1 oz)  Body  mass index is 25.1 kg/m².    Intake/Output Summary (Last 24 hours) at 7/18/2023 1453  Last data filed at 7/17/2023 1716  Gross per 24 hour   Intake 800 ml   Output --   Net 800 ml         Physical Exam  Vitals reviewed.   Constitutional:       General: He is not in acute distress.     Appearance: Normal appearance.   HENT:      Head: Normocephalic and atraumatic.      Mouth/Throat:      Mouth: Mucous membranes are moist.      Pharynx: Oropharynx is clear.   Cardiovascular:      Rate and Rhythm: Normal rate and regular rhythm.   Pulmonary:      Effort: Pulmonary effort is normal. No respiratory distress.      Breath sounds: Normal breath sounds.   Abdominal:      General: Bowel sounds are normal. There is no distension.      Palpations: Abdomen is soft.      Tenderness: There is no abdominal tenderness.   Musculoskeletal:      Right lower leg: No edema.      Left lower leg: No edema.   Skin:     General: Skin is warm.      Findings: No bruising or erythema.   Neurological:      Mental Status: He is alert and oriented to person, place, and time. Mental status is at baseline.   Psychiatric:         Mood and Affect: Mood normal.         Behavior: Behavior normal.           Significant Labs: All pertinent labs within the past 24 hours have been reviewed.    Significant Imaging: I have reviewed all pertinent imaging results/findings within the past 24 hours.      Assessment/Plan:      * Severe sepsis  Tapering off abx except cefepime given low suspicion for sepsis and negative BCx      Abnormal microbiological findings in CSF  CSF fluid cultures growing gram positive rods. Awaiting speciation to determine if need to treat or likely contaminant.      Positive serological reaction for syphilis   ID consulted for possible neurosyphilis, appreciate consult.  Clarification on timeline- per Care Everywhere, RPR 1:64 around 12/2020, then decreased to 1:8 on 10/2021, and now is 1:4.  Pt states he received PCN IM (in both  buttocks) q week x 3 weeks; we need to clarify when and where this was given. CM to assist with outside records from [facility].     -- no indication to treat syphilis; CSF VDRL negative      Acute psychosis  Patient with hx of polysubstance abuse and bipolar disorder. Admitted with paranoid delusions and auditory hallucinations. PEC'd in ED due to grave disability. Psych following and initially planed for IP psych upon discharge, however CEC rescinded on 7/18.     -- completed benzo taper 7/16  -- scheduled Haldol 2mg PO TID  -- prn IV haldol   -- psych following, appreciate recs      Seizure  -Hx of seizure disorder  - unknown if pt currently taking medication, continue to hold      HIV disease  -- on admission, CD4 count 1061 and viral load undetectable. Patient taking Symtuza.  -- ID following. Will order patient's hoome Symtuza as it is nonformulary. Patient has it with him in his belongings.       VTE Risk Mitigation (From admission, onward)         Ordered     heparin (porcine) injection 5,000 Units  Every 8 hours         07/14/23 0725     IP VTE HIGH RISK PATIENT  Once         07/14/23 0725     Place sequential compression device  Until discontinued         07/14/23 0725                Discharge Planning   LORRIE: 7/18/2023     Code Status: Full Code   Is the patient medically ready for discharge?: Yes    Reason for patient still in hospital (select all that apply): Laboratory test  Discharge Plan A: Psychiatric hospital   Discharge Delays: None known at this time              Lisset Sandra MD  Department of Hospital Medicine   [facility] - Intensive Care ([facility]

## 2023-07-18 NOTE — PROGRESS NOTES
"CONSULTATION LIAISON PSYCHIATRY PROGRESS NOTE    Patient Name: Flako Pearce  MRN: [id number]  Patient Class: IP- Inpatient  Admission Date: 7/13/2023  Attending Physician: Jose Rose MD      SUBJECTIVE:   Flako Pearce is a 33 y.o. male with past psychiatric history of bipolar I disorder, SIPD, amphetamine use disorder, sedative, hypnotic, or anxiolytic (benzodiazepine) use disorder, and anxiety & past pertinent medical history of seizures, latent syphyllis treated on 07/2022, HIV on descovy and prezcobix, presents to the ED/admitted to the hospital for Severe sepsis. UDS+ amphetamines, benzodiazepines on admission)    Psychiatry consulted for PEC for grave disability / psychosis    Pt seen and chart reviewed. Per nursing, pt experienced a panic attack yesterday evening which resolved after IV Ativan 2mg administration. NAEON. No PRN's given for non-redirectable agitation.   Has been compliant with scheduled Haldol 2mg TID. Remains on standing Valium (currently 5mg BID) taper. BP this /53, HR 79.   Per primary team, VDRL negative; now medically ready for DC.   On interview, pt was drowsy but oriented x 4. Linear, endorsed feeling ok. Denied any withdrawal sx. Denied SI/HI/AVH. Did not endorsed any paranoia or delusions. Endorsed willingness to continue follow up with outpatient psychiatrist and to schedule follow-up appointment as soon as possible. Feels anxious/worried about still being in the hospital and missing work. Denied any interest in rehab.      OBJECTIVE:    Mental Status Exam:  General Appearance: appears stated age, dressed in hospital garb, poorly groomed  Behavior: cooperative, appropriate eye-contact  Involuntary Movements and Motor Activity: no abnormal involuntary movements noted  Gait and Station: unable to assess - patient lying down or seated  Speech and Language: normal rate, normal rhythm, normal volume, soft  Mood: "OK"  Affect: flat  Thought Process and Associations: " linear, goal-directed, organized  Thought Content and Perceptions:: no suicidal or homicidal ideation, no auditory or visual hallucinations, no paranoid ideation, no ideas of reference, no evidence of delusions or psychosis  Sensorium and Orientation: grossly intact  Recent and Remote Memory: grossly intact  Attention and Concentration: grossly intact  Fund of Knowledge: grossly intact, vocabulary appropriate  Insight: limited/partial awareness of illness  Judgment: adequate    CAM ICU positive? no      ASSESSMENT & RECOMMENDATIONS   Psychosis, not otherwise specified vs substance-induced psychotic disorder  Polysubstance use disorder (alcohol, stimulants, benzodiazepines, ketamine    Psychosis NOS (suspect SIPD)  PSYCH MEDICATIONS  Continue home Haldol 2 mg TID  PRN Haldol 5 mg PO/IM and Ativan 2 mg PO/IM q6h for non-redirectable agitation  Monitor QTc (QTc 7/14/23: 421 ms)     Sedative, hypnotic, or anxiolytic (benzodiazepine) use disorder  PSYCH MEDICATIONS  Would recommend completing benzodiazepine taper per primary team (currently has standing Valium 5 mg q12h)  Monitor for s/sx of benzodiazepine withdrawal and utilize Ativan PRN for breakthrough symptoms per CIWA protocol  Ativan 2mg PO/IM PRN with withdrawal parameters for any two of the following: SBP>160, DBP>100, HR>100, tremor, or diaphoresis       OTHER PERTINENT DIAGNOSIS  Amphetamine use disorder  Polysubstance abuse (benzos, stimulants, Ketamine, pentobarbital)     RISK ASSESSMENT  Would recommend rescinding PEC/CEC, as pt is no longer gravely disabled or a danger to self or others.      FOLLOW UP  Thank you for consulting psychiatry. We will sign off at this time. Please do not hesitate to contact [facility] psychiat[facility] for any further questions or concerns.        DISPOSITION - once medically cleared:   Per primary team  Recommended pt schedule appointment with outpatient psychiatrist as soon as possible (Dr. Lentz at [facility])       Please contact ON  CALL psychiatry service (24/7) for any acute issues that may arise.    Dr. Lindy Jewell   Psychiatry, PGY-II  [facility]  7/18/2023 1:11 PM       I , Santo Siu MD, have reviewed the attached history and exam . Pt examined personally by me on 7-18-23 . The case discussed with the examining psychiatry resident physician . I agree the assessment and recommended treatment plan .   --------------------------------------------------------------------------------------------------------------------------------------------------------------------------------------------------------------------------------------    CONTINUED OBJECTIVE clinical data & findings reviewed and noted for above decision making    Current Medications:   Scheduled Meds:    ceFEPime (MAXIPIME) IVPB  2 g Intravenous Q8H    darunavir-hank-emtri-tenof ala  1 tablet Oral Daily with breakfast    haloperidoL  2 mg Oral TID    heparin (porcine)  5,000 Units Subcutaneous Q8H    mupirocin   Nasal BID     PRN Meds: acetaminophen, dextrose 10%, dextrose 10%, dextrose, dextrose, glucagon (human recombinant), haloperidol lactate, lorazepam, naloxone, ondansetron, sodium chloride 0.9%    Allergies:   Review of patient's allergies indicates:  No Known Allergies    Vitals  Vitals:    07/17/23 2332   BP: (!) 103/53   Pulse: 79   Resp: 18   Temp: 98.3 °F (36.8 °C)       Labs/Imaging/Studies:  No results found for this or any previous visit (from the past 24 hour(s)).    Imaging Results              CT Chest Abdomen Pelvis With Contrast (xpd) (Final result)  Result time 07/14/23 10:59:18   Procedure changed from CT Abdomen Pelvis With Contrast     Final result by Gigi Underwood MD (07/14/23 10:59:18)                   Impression:      Subcentimeter left lobe hepatic cyst.    Mild hepatomegaly.    Trace ascites.      Electronically signed by: Gigi Underwood MD  Date:    07/14/2023  Time:    10:59               Narrative:     EXAMINATION:  CT CHEST ABDOMEN PELVIS WITH CONTRAST (XPD)    CLINICAL HISTORY:  Sepsis;    TECHNIQUE:  Low dose axial images, sagittal and coronal reformations were obtained from the thoracic inlet to the pubic symphysis following the IV administration of 100 mL of Omnipaque 350.  No oral contrast was administered.    COMPARISON:  None    FINDINGS:  Soft tissue structures at the base of the neck appear unremarkable.  The heart is not enlarged.  The thoracic aorta is normal in caliber without evidence for aneurysmal dilatation.  No hilar or mediastinal adenopathy is identified.  No axillary adenopathy is identified.  There is some respiratory motion.  There are mild dependent atelectatic changes within the lung bases.  No lung suspicious lung nodules are evident.  There is no evidence for pneumothorax or pleural effusions.  Bony structures appear intact.  There is no evidence for acute fracture or bone destruction.    There is a 9 mm cyst within the left lobe of the liver.  The liver appears mildly enlarged.  No other focal liver lesions are identified.  The gallbladder is present and appears grossly unremarkable.  There is no evidence for intrahepatic or extrahepatic biliary dilatation.  The portal venous system is patent.  The spleen, stomach, and pancreas appear unremarkable.  The adrenal glands are not enlarged.  The kidneys are normal in size, position, and contour and are noted to concentrate contrast symmetrically.  The abdominal aorta tapers normally without aneurysmal dilatation.  No para-aortic lymphadenopathy is identified.  The appendix is not confidently identified, however there are no CT findings to suggest appendicitis.  No dilated loops of bowel are evident.  The urinary bladder appears unremarkable.  There is no evidence for pelvic or inguinal lymphadenopathy.  There is a trace amount of ascites within the pelvis and at the caudal tip of the liver.                                       CT Head With  Contrast (Final result)  Result time 07/14/23 04:47:23      Final result by Js Garrido MD (07/14/23 04:47:23)                   Impression:      The ventricular system and sulci appear prominent for age, this may relate to early involutional change, clinical and historical correlation is needed.    There is no additional evidence for acute intracranial process.    Empty sella configuration.    Electronically signed by resident: Martha Hyde  Date:    07/14/2023  Time:    04:16    Electronically signed by: Js Garrido  Date:    07/14/2023  Time:    04:47               Narrative:    EXAMINATION:  CT HEAD WITH CONTRAST    CLINICAL HISTORY:  Meningitis/CNS infection suspected;Mental status change, unknown cause;    TECHNIQUE:  Low dose axial CT images obtained throughout the head before and after the IV administration of 100 mL of Omnipaque 350.  Sagittal and coronal reconstructions were performed.    COMPARISON:  None.    FINDINGS:  Intracranial compartment:    Prominence of the ventricles and sulci out of proportion for patient's age may relate to early involutional change, clinical and historical correlation is needed no extra-axial blood or fluid collections.    The brain parenchyma otherwise appears within normal limits normal.  No parenchymal mass, hemorrhage, edema or major vascular distribution infarct.  No abnormal enhancement.    Empty sella configuration.    Skull/extracranial contents (limited evaluation):    No fracture. Mastoid air cells and paranasal sinuses are essentially clear.                                       X-Ray Chest 1 View (Final result)  Result time 07/14/23 04:28:59      Final result by Js Garrido MD (07/14/23 04:28:59)                   Impression:      Mild diminished depth of inspiration and mild atelectatic change without evidence for superimposed acute intrathoracic process.      Electronically signed by: Js Garrido  Date:    07/14/2023  Time:    04:28                Narrative:    EXAMINATION:  XR CHEST 1 VIEW    CLINICAL HISTORY:  Hypoxemia    TECHNIQUE:  Single frontal view of the chest was performed.    COMPARISON:  None    FINDINGS:  Single chest view is submitted.  There is diminished depth of inspiration and mild rotation, when accounting for these factors the cardiomediastinal silhouette appears appropriate.    Mild accentuation of pulmonary bronchovascular markings consistent with diminished depth of inspiration noted.  Mild atelectatic change noted.  There is no evidence for superimposed confluent infiltrate or consolidation, significant pleural effusion or pneumothorax.    The osseous structures appear intact.

## 2023-07-18 NOTE — ASSESSMENT & PLAN NOTE
ID consulted for possible neurosyphilis, appreciate consult.  Clarification on timeline- per Care Everywhere, RPR 1:64 around 12/2020, then decreased to 1:8 on 10/2021, and now is 1:4.  Pt states he received PCN IM (in both buttocks) q week x 3 weeks; we need to clarify when and where this was given. CM to assist with outside records from [facility].     -- no indication to treat syphilis; CSF VDRL negative

## 2023-07-18 NOTE — SUBJECTIVE & OBJECTIVE
Interval History: NAEON. CSF VDRL negative, so patient does not need further syphilis treatment. CEC rescinded on 7/18 as patient no longer gravely disabled. CSF fluid growing gram positive rods, awaiting cultures to determine if treatment required or more likely a contaminant. Patient is eager to get home to resume work.    Review of Systems   Constitutional:  Negative for appetite change, chills, diaphoresis and fever.   Eyes:  Negative for visual disturbance.   Respiratory:  Negative for cough and shortness of breath.    Cardiovascular:  Negative for chest pain.   Gastrointestinal:  Negative for abdominal pain, diarrhea and nausea.   Genitourinary:  Negative for dysuria and hematuria.   Neurological:  Positive for headaches. Negative for light-headedness.   Objective:     Vital Signs (Most Recent):  Temp: 98 °F (36.7 °C) (07/18/23 1206)  Pulse: 71 (07/18/23 1206)  Resp: 20 (07/18/23 1206)  BP: (!) 95/51 (07/18/23 1206)  SpO2: 97 % (07/18/23 1206) Vital Signs (24h Range):  Temp:  [97.4 °F (36.3 °C)-98.3 °F (36.8 °C)] 98 °F (36.7 °C)  Pulse:  [65-90] 71  Resp:  [18-20] 20  SpO2:  [93 %-97 %] 97 %  BP: ()/(50-59) 95/51     Weight: 86.3 kg (190 lb 4.1 oz)  Body mass index is 25.1 kg/m².    Intake/Output Summary (Last 24 hours) at 7/18/2023 1453  Last data filed at 7/17/2023 1716  Gross per 24 hour   Intake 800 ml   Output --   Net 800 ml         Physical Exam  Vitals reviewed.   Constitutional:       General: He is not in acute distress.     Appearance: Normal appearance.   HENT:      Head: Normocephalic and atraumatic.      Mouth/Throat:      Mouth: Mucous membranes are moist.      Pharynx: Oropharynx is clear.   Cardiovascular:      Rate and Rhythm: Normal rate and regular rhythm.   Pulmonary:      Effort: Pulmonary effort is normal. No respiratory distress.      Breath sounds: Normal breath sounds.   Abdominal:      General: Bowel sounds are normal. There is no distension.      Palpations: Abdomen is soft.       Tenderness: There is no abdominal tenderness.   Musculoskeletal:      Right lower leg: No edema.      Left lower leg: No edema.   Skin:     General: Skin is warm.      Findings: No bruising or erythema.   Neurological:      Mental Status: He is alert and oriented to person, place, and time. Mental status is at baseline.   Psychiatric:         Mood and Affect: Mood normal.         Behavior: Behavior normal.           Significant Labs: All pertinent labs within the past 24 hours have been reviewed.    Significant Imaging: I have reviewed all pertinent imaging results/findings within the past 24 hours.

## 2023-07-19 VITALS
TEMPERATURE: 99 F | OXYGEN SATURATION: 96 % | HEART RATE: 88 BPM | RESPIRATION RATE: 17 BRPM | SYSTOLIC BLOOD PRESSURE: 108 MMHG | HEIGHT: 73 IN | DIASTOLIC BLOOD PRESSURE: 64 MMHG | BODY MASS INDEX: 25.22 KG/M2 | WEIGHT: 190.25 LBS

## 2023-07-19 LAB
ALBUMIN SERPL BCP-MCNC: 3.7 G/DL (ref 3.5–5.2)
ALP SERPL-CCNC: 70 U/L (ref 55–135)
ALT SERPL W/O P-5'-P-CCNC: 26 U/L (ref 10–44)
ANION GAP SERPL CALC-SCNC: 7 MMOL/L (ref 8–16)
AST SERPL-CCNC: 28 U/L (ref 10–40)
BACTERIA BLD CULT: NORMAL
BACTERIA BLD CULT: NORMAL
BACTERIA CSF CULT: ABNORMAL
BACTERIA CSF CULT: ABNORMAL
BASOPHILS # BLD AUTO: 0.05 K/UL (ref 0–0.2)
BASOPHILS NFR BLD: 0.8 % (ref 0–1.9)
BILIRUB SERPL-MCNC: 0.4 MG/DL (ref 0.1–1)
BUN SERPL-MCNC: 10 MG/DL (ref 6–20)
CALCIUM SERPL-MCNC: 9.3 MG/DL (ref 8.7–10.5)
CHLORIDE SERPL-SCNC: 104 MMOL/L (ref 95–110)
CO2 SERPL-SCNC: 25 MMOL/L (ref 23–29)
CREAT SERPL-MCNC: 0.9 MG/DL (ref 0.5–1.4)
DIFFERENTIAL METHOD: NORMAL
EOSINOPHIL # BLD AUTO: 0.1 K/UL (ref 0–0.5)
EOSINOPHIL NFR BLD: 2.1 % (ref 0–8)
ERYTHROCYTE [DISTWIDTH] IN BLOOD BY AUTOMATED COUNT: 13.4 % (ref 11.5–14.5)
EST. GFR  (NO RACE VARIABLE): >60 ML/MIN/1.73 M^2
GLUCOSE SERPL-MCNC: 93 MG/DL (ref 70–110)
GRAM STN SPEC: ABNORMAL
GRAM STN SPEC: ABNORMAL
HCT VFR BLD AUTO: 43.8 % (ref 40–54)
HGB BLD-MCNC: 14.5 G/DL (ref 14–18)
IMM GRANULOCYTES # BLD AUTO: 0.01 K/UL (ref 0–0.04)
IMM GRANULOCYTES NFR BLD AUTO: 0.2 % (ref 0–0.5)
LYMPHOCYTES # BLD AUTO: 1.2 K/UL (ref 1–4.8)
LYMPHOCYTES NFR BLD: 19.3 % (ref 18–48)
MAGNESIUM SERPL-MCNC: 2 MG/DL (ref 1.6–2.6)
MCH RBC QN AUTO: 29.7 PG (ref 27–31)
MCHC RBC AUTO-ENTMCNC: 33.1 G/DL (ref 32–36)
MCV RBC AUTO: 90 FL (ref 82–98)
MONOCYTES # BLD AUTO: 0.6 K/UL (ref 0.3–1)
MONOCYTES NFR BLD: 9.9 % (ref 4–15)
NEUTROPHILS # BLD AUTO: 4.2 K/UL (ref 1.8–7.7)
NEUTROPHILS NFR BLD: 67.7 % (ref 38–73)
NRBC BLD-RTO: 0 /100 WBC
PHOSPHATE SERPL-MCNC: 2.4 MG/DL (ref 2.7–4.5)
PLATELET # BLD AUTO: 248 K/UL (ref 150–450)
PMV BLD AUTO: 10.4 FL (ref 9.2–12.9)
POCT GLUCOSE: 95 MG/DL (ref 70–110)
POTASSIUM SERPL-SCNC: 4.5 MMOL/L (ref 3.5–5.1)
PROT SERPL-MCNC: 7 G/DL (ref 6–8.4)
RBC # BLD AUTO: 4.89 M/UL (ref 4.6–6.2)
SODIUM SERPL-SCNC: 136 MMOL/L (ref 136–145)
WBC # BLD AUTO: 6.17 K/UL (ref 3.9–12.7)

## 2023-07-19 PROCEDURE — 99239 PR HOSPITAL DISCHARGE DAY,>30 MIN: ICD-10-PCS | Mod: ,,, | Performed by: STUDENT IN AN ORGANIZED HEALTH CARE EDUCATION/TRAINING PROGRAM

## 2023-07-19 PROCEDURE — 25000003 PHARM REV CODE 250: Performed by: HOSPITALIST

## 2023-07-19 PROCEDURE — 84100 ASSAY OF PHOSPHORUS: CPT

## 2023-07-19 PROCEDURE — 63600175 PHARM REV CODE 636 W HCPCS: Performed by: HOSPITALIST

## 2023-07-19 PROCEDURE — 99239 HOSP IP/OBS DSCHRG MGMT >30: CPT | Mod: ,,, | Performed by: STUDENT IN AN ORGANIZED HEALTH CARE EDUCATION/TRAINING PROGRAM

## 2023-07-19 PROCEDURE — 83735 ASSAY OF MAGNESIUM: CPT

## 2023-07-19 PROCEDURE — 36415 COLL VENOUS BLD VENIPUNCTURE: CPT

## 2023-07-19 PROCEDURE — 80053 COMPREHEN METABOLIC PANEL: CPT

## 2023-07-19 PROCEDURE — 63600175 PHARM REV CODE 636 W HCPCS

## 2023-07-19 PROCEDURE — 85025 COMPLETE CBC W/AUTO DIFF WBC: CPT

## 2023-07-19 PROCEDURE — 25000003 PHARM REV CODE 250

## 2023-07-19 RX ORDER — HALOPERIDOL 2 MG/1
2 TABLET ORAL 3 TIMES DAILY
Qty: 90 TABLET | Refills: 11 | Status: ON HOLD | OUTPATIENT
Start: 2023-07-19 | End: 2023-10-02 | Stop reason: HOSPADM

## 2023-07-19 RX ADMIN — LORAZEPAM 2 MG: 2 INJECTION INTRAMUSCULAR; INTRAVENOUS at 04:07

## 2023-07-19 RX ADMIN — HALOPERIDOL 2 MG: 2 TABLET ORAL at 08:07

## 2023-07-19 RX ADMIN — DARUNAVIR, COBICISTAT, EMTRICITABINE, AND TENOFOVIR ALAFENAMIDE 1 TABLET: 800; 150; 200; 10 TABLET, FILM COATED ORAL at 08:07

## 2023-07-19 RX ADMIN — CEFEPIME 2 G: 2 INJECTION, POWDER, FOR SOLUTION INTRAVENOUS at 02:07

## 2023-07-19 NOTE — ASSESSMENT & PLAN NOTE
Tapering off abx except cefepime given low suspicion for sepsis and negative BCx. Cefepime discontinued prior to discharge

## 2023-07-19 NOTE — DISCHARGE SUMMARY
[facility] - Intensive Care ([place] [id number])  [facility] Medicine  Discharge Summary      Patient Name: Flako Pearce  MRN: [id number]  KARLA: [id number]  Patient Class: IP- Inpatient  Admission Date: 7/13/2023  Hospital Length of Stay: 5 days  Discharge Date and Time: 7/19/2023  1:22 PM  Attending Physician: Inocencia att. providers found   Discharging Provider: Lisset Sandra MD  Primary Care Provider: Primary Doctor Inocencia  [facility] Medicine Team: [facility] HOSP MED J Lisset Sandra MD  Primary Care Team: [facility] HOSP MED J    HPI:   32 y/o M with PMH of HIV, seizure disorder, and schizophrenia presented with acute psychosis. Per ED personnel and records, pt was seen and medically cleared by previous team in [facility] ED, however while waiting for transfer to psych facility he became hypotensive, bradycardic, and hypothermic. Following this event, an extensive workup was performed including a additional sepsis evaluation, CT head, CT chest, CT abd/pelvis, lumbar puncture. He received fluids and empiric antibiotics and antivirals. He was also put on a rewarming device. BP improved s/p fluid resuscitation. He is not hypercapnic. Labs are still pending, but pt currently being treated for sepsis of unknown origin. Pt is still under PEC.    Following the initial events that incited full medical workup, my initial evaluation of the patient was difficult 2/2 pt somnolence. Pt would open his eyes briefly, but only after a light sternal rub. He would attempt to answer questions, but then quickly fell back asleep. I was unable to obtain any further/useful information from the patient regarding his condition.      * No surgery found *      Hospital Course:   Pt is a 34 yo male with hx of Bipolar disorder and polysubstance abuse (ketamine, pentobarbital) who presented to the ED with AMS and psychosis. Pt was on his way to be admitted to inpatient psych when he began to become unresponsive. Pt was PED'd at ED. Pt was hypotensive, bradycardic and hypothermic.  ICU was consulted due to MAP below 65 and pt not responding. ICU evaluated the pt who did not meet ICU criteria. USD was remarkable for benzodiazepines and amphetamine. Lab work remarkable for HIV+, RPR + and pending VDRL. ID consulted to rule out neurosyphilis.  Currently on symtuza and haldol. Pt reports taking xanax 6mg daily. Currently on valium taper for benzos withdrawal. Pt's mother contacted, she reports that the pt has no history of seizure disorder, but takes all types of hard drugs. Pt has been on rehab previously. Last episodes of acute psychosis where back in December and March. Was treated for Syphilis at [facility] on 4/2023. CSF VDRL negative, so patient does not need further syphilis treatment. CEC rescinded on 7/18 as patient no longer gravely disabled. CSF fluid growing gram positive rods, speciated to proprionebacterium as likely contaminant. Patient declining inpatient rehab and eager to return to work, stating he has a place to stay. Spoke with mother to update her on patient's status and stable for discharge on 7/19.       Goals of Care Treatment Preferences:  Code Status: Full Code      Consults:   Consults (From admission, onward)        Status Ordering Provider     Inpatient consult to Infectious Diseases  Once        Provider:  (Not yet assigned)    Completed SAMREEN LION     Inpatient consult to Critical Care Medicine  Once        Provider:  (Not yet assigned)    Completed RICKI SELBY     Inpatient consult to Psychiatry  Once        Provider:  (Not yet assigned)    Completed COURTNEY REILLY        Interval History: CSF broth growing Propionibacterium species, likely contaminant and not concerning for CNS infection. Patient stable for discharge. Called mother and updated her on medical status.    Review of Systems   Constitutional:  Negative for chills and fever.   Eyes:  Negative for visual disturbance.   Respiratory:  Negative for cough and shortness of breath.     Cardiovascular:  Negative for chest pain.   Gastrointestinal:  Negative for abdominal pain, diarrhea and nausea.   Genitourinary:  Negative for dysuria.   Neurological:  Negative for light-headedness and headaches.   Psychiatric/Behavioral:  Negative for agitation, confusion and hallucinations.    Objective:     Vital Signs (Most Recent):  Temp: 98.9 °F (37.2 °C) (07/19/23 1149)  Pulse: 88 (07/19/23 1149)  Resp: 17 (07/19/23 1149)  BP: 108/64 (07/19/23 1149)  SpO2: 96 % (07/19/23 1149) Vital Signs (24h Range):  Temp:  [97.9 °F (36.6 °C)-98.9 °F (37.2 °C)] 98.9 °F (37.2 °C)  Pulse:  [66-88] 88  Resp:  [17-18] 17  SpO2:  [95 %-98 %] 96 %  BP: ()/(53-67) 108/64     Weight: 86.3 kg (190 lb 4.1 oz)  Body mass index is 25.1 kg/m².    Intake/Output Summary (Last 24 hours) at 7/19/2023 1718  Last data filed at 7/19/2023 0928  Gross per 24 hour   Intake 120 ml   Output --   Net 120 ml         Physical Exam  Vitals reviewed.   Constitutional:       Appearance: Normal appearance.   HENT:      Head: Normocephalic and atraumatic.      Mouth/Throat:      Mouth: Mucous membranes are moist.      Pharynx: Oropharynx is clear.   Cardiovascular:      Rate and Rhythm: Normal rate and regular rhythm.   Pulmonary:      Effort: Pulmonary effort is normal. No respiratory distress.      Breath sounds: Normal breath sounds.   Abdominal:      General: Bowel sounds are normal. There is no distension.      Palpations: Abdomen is soft.      Tenderness: There is no abdominal tenderness.   Musculoskeletal:      Right lower leg: No edema.      Left lower leg: No edema.   Skin:     General: Skin is warm.      Findings: No bruising or erythema.   Neurological:      Mental Status: He is alert and oriented to person, place, and time. Mental status is at baseline.   Psychiatric:         Mood and Affect: Mood normal.         Behavior: Behavior normal.           Significant Labs: All pertinent labs within the past 24 hours have been  reviewed.    Significant Imaging: I have reviewed all pertinent imaging results/findings within the past 24 hours.        Neuro  Abnormal microbiological findings in CSF  CSF fluid cultures growing gram positive rods growing propionibacterium species - likely skin contaminant not requiring further treatment.    Psychiatric  Acute psychosis  Patient with hx of polysubstance abuse and bipolar disorder. Admitted with paranoid delusions and auditory hallucinations. PEC'd in ED due to grave disability. Psych following and initially planed for IP psych upon discharge, however CEC rescinded on 7/18.     -- completed benzo taper 7/16  -- scheduled Haldol 2mg PO TID  -- prn IV haldol   -- psych following, appreciate recs      ID  * Severe sepsis  Tapering off abx except cefepime given low suspicion for sepsis and negative BCx. Cefepime discontinued prior to discharge        Final Active Diagnoses:    Diagnosis Date Noted POA    PRINCIPAL PROBLEM:  Severe sepsis [A41.9, R65.20] 07/14/2023 Yes    Abnormal microbiological findings in CSF [R83.5] 07/18/2023 Unknown    Positive serological reaction for syphilis [A53.0] 07/15/2023 Yes    Acute psychosis [F23] 03/19/2023 Yes    Seizure [R56.9] 03/19/2023 Yes    HIV disease [B20] 10/22/2018 Yes     Chronic      Problems Resolved During this Admission:       Discharged Condition: good    Disposition: Home or Self Care    Follow Up:   Follow-up Information     [facility]. Schedule an appointment as soon as possible for a visit in 1 week(s).    Specialties: Internal Medicine, Family Medicine  Contact information:  [facility] [id number] [phone]             Primary Doctor No Follow up.                     Patient Instructions:      Ambulatory referral/consult to Social Work   Standing Status: Future   Referral Priority: Routine Referral Type: Consultation   Referral Reason: Specialty Services Required   Requested Specialty: Licensed  Clinical    Number of Visits Requested: 1     Ambulatory referral/consult to Outpatient Case Management   Referral Priority: Routine Referral Type: Consultation   Referral Reason: Specialty Services Required   Number of Visits Requested: 1       Significant Diagnostic Studies: Labs: All labs within the past 24 hours have been reviewed  Microbiology:   CSF Culture:   Lab Results   Component Value Date    CSFCULTURE  07/14/2023     Results called to and read back by:Nicolette Magnaa RN 07/18/2023  07:44    CSFCULTURE PROPIONIBACTERIUM SPECIES  From broth only   (A) 07/14/2023       Pending Diagnostic Studies:     Procedure Component Value Units Date/Time    Freeze and Hold, [facility] [[id number]] Collected: 07/14/23 0449    Order Status: Sent Lab Status: No result     Specimen: CSF (Spinal Fluid) from Cerebrospinal Fluid          Medications:  Reconciled Home Medications:      Medication List      START taking these medications    haloperidoL 2 MG tablet  Commonly known as: HALDOL  Take 1 tablet (2 mg total) by mouth 3 (three) times daily.        CONTINUE taking these medications    SYMTUZA [phone] mg Tab  Generic drug: darunavir-hank-emtri-tenof ala  Take 1 tablet by mouth once daily.        STOP taking these medications    divalproex 500 MG Tb24     haloperidol decanoate 100 mg/mL injection  Commonly known as: HALDOL DECANOATE     pregabalin 150 MG capsule  Commonly known as: LYRICA     propranoloL 80 MG tablet  Commonly known as: INDERAL            Indwelling Lines/Drains at time of discharge:   Lines/Drains/Airways     None                 Time spent on the discharge of patient: 40 minutes         Lisset Sandra MD  Department of Hospital Medicine  [facility] Intensive Care ([place]-14)

## 2023-07-19 NOTE — ASSESSMENT & PLAN NOTE
CSF fluid cultures growing gram positive rods growing propionibacterium species - likely skin contaminant not requiring further treatment.

## 2023-07-19 NOTE — NURSING
"On-call for medicine team J Flako Brenner MD notified that pt refused their AM labs, team responded "noted", no new orders at this time.  "

## 2023-07-19 NOTE — PLAN OF CARE
07/19/23 1401   Final Note   Assessment Type Final Discharge Note   Anticipated Discharge Disposition Home [place]   What phone number can be called within the next 1-3 days to see how you are doing after discharge? [phone]   Hospital Resources/Appts/Education Provided Provided patient/caregiver with written discharge plan information;Appointments scheduled and added to AVS   Post-Acute Status   Post-Acute Authorization Other   Other Status Community Services  (Care at Home: community Care)   Discharge Delays None known at this time        The patient is being d/c today with no social service needs identified at this time.     Archana Dang RN  Case Management  [facility]  [phone]

## 2023-07-19 NOTE — NURSING
"Pt AAO x 4, no c/o pain. Pt discharged per MD order. IV to right wrist and left arm discontinued with catheter intact. Discharge instructions reviewed with the patient. Return to work note printed and given to the patient. Pt verbalized understanding of all instructions. Instructed pt that medication would be delivered to his bedside. Pt insisted that he not wait on the medication and leave the unit. Pt stated " I have that medicine at home already". Pt ambulated off the unit with belongings and discharge paperwork, but did not wait for his medication.   "

## 2023-07-19 NOTE — PLAN OF CARE
[facility] - Intensive Care ([facility])  Discharge Final Note    Primary Care Provider: Primary Doctor No    Expected Discharge Date: 7/19/2023    Final Discharge Note (most recent)       Final Note - 07/19/23 1401          Final Note    Assessment Type Final Discharge Note     Anticipated Discharge Disposition Home or Self Care with Planned Readmission     What phone number can be called within the next 1-3 days to see how you are doing after discharge? [phone]     Hospital Resources/Appts/Education Provided Provided patient/caregiver with written discharge plan information;Appointments scheduled and added to AVS        Post-Acute Status    Post-Acute Authorization Other     Other Status Community Services   Care at Home: community Care    Discharge Delays None known at this time                                Contact Info       [facility]   Specialty: Internal Medicine, Family Medicine    [address] [place] [id number]   Phone: [phone]       Next Steps: Schedule an appointment as soon as possible for a visit in 1 week(s)    No, Primary Doctor   Relationship: PCP - General        Next Steps: Follow up          No future appointments.    Patient informed to call the clinic and schedule a follow up appointment with  [facility].    Patient received work excuse    Archana Dang RN  Case Management  [facility]  [phone]

## 2023-07-19 NOTE — SUBJECTIVE & OBJECTIVE
Interval History: CSF broth growing Propionibacterium species, likely contaminant and not concerning for CNS infection. Patient stable for discharge. Called mother and updated her on medical status.    Review of Systems   Constitutional:  Negative for chills and fever.   Eyes:  Negative for visual disturbance.   Respiratory:  Negative for cough and shortness of breath.    Cardiovascular:  Negative for chest pain.   Gastrointestinal:  Negative for abdominal pain, diarrhea and nausea.   Genitourinary:  Negative for dysuria.   Neurological:  Negative for light-headedness and headaches.   Psychiatric/Behavioral:  Negative for agitation, confusion and hallucinations.    Objective:     Vital Signs (Most Recent):  Temp: 98.9 °F (37.2 °C) (07/19/23 1149)  Pulse: 88 (07/19/23 1149)  Resp: 17 (07/19/23 1149)  BP: 108/64 (07/19/23 1149)  SpO2: 96 % (07/19/23 1149) Vital Signs (24h Range):  Temp:  [97.9 °F (36.6 °C)-98.9 °F (37.2 °C)] 98.9 °F (37.2 °C)  Pulse:  [66-88] 88  Resp:  [17-18] 17  SpO2:  [95 %-98 %] 96 %  BP: ()/(53-67) 108/64     Weight: 86.3 kg (190 lb 4.1 oz)  Body mass index is 25.1 kg/m².    Intake/Output Summary (Last 24 hours) at 7/19/2023 1718  Last data filed at 7/19/2023 0928  Gross per 24 hour   Intake 120 ml   Output --   Net 120 ml         Physical Exam  Vitals reviewed.   Constitutional:       Appearance: Normal appearance.   HENT:      Head: Normocephalic and atraumatic.      Mouth/Throat:      Mouth: Mucous membranes are moist.      Pharynx: Oropharynx is clear.   Cardiovascular:      Rate and Rhythm: Normal rate and regular rhythm.   Pulmonary:      Effort: Pulmonary effort is normal. No respiratory distress.      Breath sounds: Normal breath sounds.   Abdominal:      General: Bowel sounds are normal. There is no distension.      Palpations: Abdomen is soft.      Tenderness: There is no abdominal tenderness.   Musculoskeletal:      Right lower leg: No edema.      Left lower leg: No edema.    Skin:     General: Skin is warm.      Findings: No bruising or erythema.   Neurological:      Mental Status: He is alert and oriented to person, place, and time. Mental status is at baseline.   Psychiatric:         Mood and Affect: Mood normal.         Behavior: Behavior normal.           Significant Labs: All pertinent labs within the past 24 hours have been reviewed.    Significant Imaging: I have reviewed all pertinent imaging results/findings within the past 24 hours.

## 2023-07-20 ENCOUNTER — PATIENT OUTREACH (OUTPATIENT)
Dept: ADMINISTRATIVE | Facility: OTHER | Age: 33
End: 2023-07-20
Payer: MEDICAID

## 2023-07-26 NOTE — PROGRESS NOTES
CHW - Initial Contact    This Community Health Worker updated the Social Determinant of Health questionnaire with patient via telephone today.    Pt identified barriers of most importance are: finances  Referrals to community agencies completed with patient/caregiver consent outside of [facility] include: [facility]  Referrals were put through [facility] - No  Support and Services: at the end of the phone call, patient stated he has a CM with [facility]  Other information discussed the patient needs / wants help with: We agreed to a follow up call next week and will then close the case.     Follow-up Outreach - Due: 7/31/2023

## 2023-08-10 ENCOUNTER — DOCUMENTATION ONLY (OUTPATIENT)
Dept: PSYCHIATRY | Facility: CLINIC | Age: 33
End: 2023-08-10
Payer: MEDICAID

## 2023-08-10 ENCOUNTER — PATIENT OUTREACH (OUTPATIENT)
Dept: ADMINISTRATIVE | Facility: OTHER | Age: 33
End: 2023-08-10
Payer: MEDICAID

## 2023-08-10 NOTE — PROGRESS NOTES
CHW - Follow Up and Case Closure    This Community Health Worker completed a follow up visit with patient via telephone today.  Pt reported:needed transportation and behavioral health  Community Health Worker provided: insurance transportation number and reminded patient to contact [facility] of which he is familiar..   Pt denied any additional needs at this time and agrees with episode closure at this time.  Provided patient with Community Health Worker's contact information and encouraged him to contact this Community Health Worker if additional needs arise.

## 2023-08-10 NOTE — PROGRESS NOTES
Out of state family is concerned about patient's behavioral health . He has been hospitalized at [facility] under Dr Portillo Orlando  in early 2023 . If he arrives in any [facility] ER for psychiatric concerns , please consider hospitalization at [facility] ( [phone] ) for continuity of care.

## 2023-08-16 LAB — FUNGUS SPEC CULT: NORMAL

## 2023-09-01 LAB
ACID FAST MOD KINY STN SPEC: NORMAL
MYCOBACTERIUM SPEC QL CULT: NORMAL

## 2023-09-27 PROBLEM — F15.20 METHAMPHETAMINE USE DISORDER, SEVERE: Status: ACTIVE | Noted: 2023-09-27

## 2023-09-27 PROBLEM — F19.10 SUBSTANCE ABUSE: Status: ACTIVE | Noted: 2023-09-27

## 2023-09-27 PROBLEM — Z13.9 ENCOUNTER FOR MEDICAL SCREENING EXAMINATION: Status: ACTIVE | Noted: 2023-09-27

## 2023-10-16 PROBLEM — A41.9 SEVERE SEPSIS: Status: RESOLVED | Noted: 2023-07-14 | Resolved: 2023-10-16

## 2023-10-16 PROBLEM — R65.20 SEVERE SEPSIS: Status: RESOLVED | Noted: 2023-07-14 | Resolved: 2023-10-16

## 2023-10-30 ENCOUNTER — HOSPITAL ENCOUNTER (EMERGENCY)
Facility: HOSPITAL | Age: 33
Discharge: HOME | End: 2023-10-31
Attending: EMERGENCY MEDICINE
Payer: MEDICAID

## 2023-10-30 DIAGNOSIS — R56.9 SEIZURE: Primary | ICD-10-CM

## 2023-10-30 PROCEDURE — 99283 EMERGENCY DEPT VISIT LOW MDM: CPT

## 2023-10-31 VITALS
WEIGHT: 190 LBS | SYSTOLIC BLOOD PRESSURE: 125 MMHG | TEMPERATURE: 99 F | OXYGEN SATURATION: 100 % | HEART RATE: 90 BPM | BODY MASS INDEX: 25.07 KG/M2 | DIASTOLIC BLOOD PRESSURE: 80 MMHG | RESPIRATION RATE: 18 BRPM

## 2023-10-31 LAB
ALBUMIN SERPL BCP-MCNC: 4.2 G/DL (ref 3.5–5.2)
ALP SERPL-CCNC: 82 U/L (ref 55–135)
ALT SERPL W/O P-5'-P-CCNC: 16 U/L (ref 10–44)
ANION GAP SERPL CALC-SCNC: 12 MMOL/L (ref 8–16)
AST SERPL-CCNC: 15 U/L (ref 10–40)
BASOPHILS # BLD AUTO: 0.05 K/UL (ref 0–0.2)
BASOPHILS NFR BLD: 0.6 % (ref 0–1.9)
BILIRUB SERPL-MCNC: 0.7 MG/DL (ref 0.1–1)
BUN SERPL-MCNC: 11 MG/DL (ref 6–20)
CALCIUM SERPL-MCNC: 9.4 MG/DL (ref 8.7–10.5)
CHLORIDE SERPL-SCNC: 105 MMOL/L (ref 95–110)
CO2 SERPL-SCNC: 21 MMOL/L (ref 23–29)
CREAT SERPL-MCNC: 0.9 MG/DL (ref 0.5–1.4)
DIFFERENTIAL METHOD: ABNORMAL
EOSINOPHIL # BLD AUTO: 0 K/UL (ref 0–0.5)
EOSINOPHIL NFR BLD: 0.4 % (ref 0–8)
ERYTHROCYTE [DISTWIDTH] IN BLOOD BY AUTOMATED COUNT: 13.7 % (ref 11.5–14.5)
EST. GFR  (NO RACE VARIABLE): >60 ML/MIN/1.73 M^2
GLUCOSE SERPL-MCNC: 116 MG/DL (ref 70–110)
HCT VFR BLD AUTO: 38.3 % (ref 40–54)
HGB BLD-MCNC: 13.2 G/DL (ref 14–18)
IMM GRANULOCYTES # BLD AUTO: 0.02 K/UL (ref 0–0.04)
IMM GRANULOCYTES NFR BLD AUTO: 0.3 % (ref 0–0.5)
LYMPHOCYTES # BLD AUTO: 1.8 K/UL (ref 1–4.8)
LYMPHOCYTES NFR BLD: 22.7 % (ref 18–48)
MCH RBC QN AUTO: 31.2 PG (ref 27–31)
MCHC RBC AUTO-ENTMCNC: 34.5 G/DL (ref 32–36)
MCV RBC AUTO: 91 FL (ref 82–98)
MONOCYTES # BLD AUTO: 0.8 K/UL (ref 0.3–1)
MONOCYTES NFR BLD: 9.8 % (ref 4–15)
NEUTROPHILS # BLD AUTO: 5.1 K/UL (ref 1.8–7.7)
NEUTROPHILS NFR BLD: 66.2 % (ref 38–73)
NRBC BLD-RTO: 0 /100 WBC
PLATELET # BLD AUTO: 249 K/UL (ref 150–450)
PMV BLD AUTO: 9.9 FL (ref 9.2–12.9)
POTASSIUM SERPL-SCNC: 3.8 MMOL/L (ref 3.5–5.1)
PROT SERPL-MCNC: 7.1 G/DL (ref 6–8.4)
RBC # BLD AUTO: 4.23 M/UL (ref 4.6–6.2)
SODIUM SERPL-SCNC: 138 MMOL/L (ref 136–145)
WBC # BLD AUTO: 7.75 K/UL (ref 3.9–12.7)

## 2023-10-31 PROCEDURE — 80053 COMPREHEN METABOLIC PANEL: CPT | Performed by: EMERGENCY MEDICINE

## 2023-10-31 PROCEDURE — 85025 COMPLETE CBC W/AUTO DIFF WBC: CPT | Performed by: EMERGENCY MEDICINE

## 2023-10-31 NOTE — ED PROVIDER NOTES
Encounter Date: 10/30/2023       History     Chief Complaint   Patient presents with    Seizures     Reports multiple seizures today. Reports noncompliance with Rx meds     Flako Pearce is a 33 y.o. male with past psychiatric history of psychosis, substance induced mood disorder, HIV, alcohol use disorder, insomnia, anxiety, seizure disorder.  Patient states that he has been having more seizures and normal.  States that his seizures are focal with twitching of his arm and normally self-resolving without any intervention.  Patient states that he takes Ativan and Lyrica for his seizure disorder and states that he feels like he needs extra medications at this time to prevent further seizures.  He denies any current substance use.  States that he is not on any antipsychotics at this time.  States that he does take his HIV medications every day without any complications.  Denies cough, denies chest pain, denies abdominal pain or vomiting.  Denies swelling or pain in any of his extremities and arms.  Of note patient does have multiple bug bites throughout his extremities, denies any pain or swelling associated with those.    Per chart review patient has a many admissions related to these requests.  In his  patient has multiple prescribers for the same medications including Lyrica.    The history is provided by the patient and medical records.     Review of patient's allergies indicates:   Allergen Reactions    Clindamycin      Per report from [facility]     Past Medical History:   Diagnosis Date    Bipolar 1 disorder     PHU (generalized anxiety disorder)     Panic disorder      History reviewed. No pertinent surgical history.  History reviewed. No pertinent family history.  Social History     Tobacco Use    Smoking status: Some Days     Types: Cigarettes    Smokeless tobacco: Never   Substance Use Topics    Alcohol use: Yes     Comment: occasionally     Drug use: No     Review of Systems  ROS negative except as noted  in HPI    Physical Exam     Initial Vitals [10/30/23 2333]   BP Pulse Resp Temp SpO2   126/80 89 17 98.7 °F (37.1 °C) 98 %      MAP       --         Physical Exam    Constitutional: He is cooperative.   Disheveled appearance   HENT:   Head: Normocephalic.   Right Ear: External ear normal.   Left Ear: External ear normal.   Nose: Nose normal.   Mouth/Throat: Oropharynx is clear and moist.   Eyes: EOM are normal. Right eye exhibits no discharge. Left eye exhibits no discharge.   Neck:   Normal range of motion.  Cardiovascular:  Normal rate, regular rhythm, normal heart sounds and intact distal pulses.           Pulmonary/Chest: Breath sounds normal. No respiratory distress.   Abdominal: Abdomen is soft. He exhibits no distension.   Musculoskeletal:         General: No tenderness. Normal range of motion.      Cervical back: Normal range of motion.     Neurological: He is alert and oriented to person, place, and time. He has normal strength. GCS score is 15. GCS eye subscore is 4. GCS verbal subscore is 5. GCS motor subscore is 6.   Skin: Skin is warm. Capillary refill takes less than 2 seconds.   Multiple bug bites   Psychiatric: He has a normal mood and affect.           ED Course   Procedures  Labs Reviewed   CBC W/ AUTO DIFFERENTIAL - Abnormal; Notable for the following components:       Result Value    RBC 4.23 (*)     Hemoglobin 13.2 (*)     Hematocrit 38.3 (*)     MCH 31.2 (*)     All other components within normal limits   COMPREHENSIVE METABOLIC PANEL   URINALYSIS, REFLEX TO URINE CULTURE   DRUG SCREEN PANEL, URINE EMERGENCY          Imaging Results    None          Medications - No data to display  Medical Decision Making  Flako Pearce is a 33 y.o. male with past psychiatric history of psychosis, substance induced mood disorder, HIV, alcohol use disorder, insomnia, anxiety, seizure disorder.  Chief complaint increased frequency of seizures.    On initial evaluation patient is calm and cooperative with no  acute distress.  Stating he is having more seizures than normal.    He was asking for medication to prevent further treatment.  He is unable to describe who manages his seizures.  States that he takes Ativan Lyrica for his seizures.    Workup to include electrolytes, and basic labs.    Patient decided to leave AMA complete laboratory workup returned        Amount and/or Complexity of Data Reviewed  Labs: ordered.               ED Course as of 10/31/23 0226   Tue Oct 31, 2023   0203 Left AMA before entire work up returned   [TK]      ED Course User Index  [TK] Mirela Allen DO                    Clinical Impression:   Final diagnoses:  [R56.9] Seizure (Primary)        ED Disposition Condition    AMA Stable                Mirela Allen DO  Resident  10/31/23 0226

## 2023-10-31 NOTE — ED TRIAGE NOTES
"Flako Pearce, an 33 y.o. male presents to the ED after suffering a seizure "an hour ago before coming in". Pt reports tremors. R hand and forearm swollen. Pt states "I hit my arm on a dumpster the other day". Pt denies pain.      Chief Complaint   Patient presents with    Seizures     Reports multiple seizures today. Reports noncompliance with Rx meds     Review of patient's allergies indicates:   Allergen Reactions    Clindamycin      Per report from [facility]     Past Medical History:   Diagnosis Date    Bipolar 1 disorder     PHU (generalized anxiety disorder)     Panic disorder       "

## 2023-12-09 ENCOUNTER — HOSPITAL ENCOUNTER (EMERGENCY)
Facility: OTHER | Age: 33
Discharge: HOME | End: 2023-12-09
Attending: EMERGENCY MEDICINE
Payer: MEDICAID

## 2023-12-09 VITALS
OXYGEN SATURATION: 100 % | TEMPERATURE: 98 F | WEIGHT: 185 LBS | HEIGHT: 73 IN | HEART RATE: 69 BPM | SYSTOLIC BLOOD PRESSURE: 102 MMHG | BODY MASS INDEX: 24.52 KG/M2 | RESPIRATION RATE: 17 BRPM | DIASTOLIC BLOOD PRESSURE: 58 MMHG

## 2023-12-09 DIAGNOSIS — G40.919 BREAKTHROUGH SEIZURE: Primary | ICD-10-CM

## 2023-12-09 DIAGNOSIS — R41.82 ALTERED MENTAL STATUS: ICD-10-CM

## 2023-12-09 LAB
ALBUMIN SERPL BCP-MCNC: 4 G/DL (ref 3.5–5.2)
ALP SERPL-CCNC: 70 U/L (ref 55–135)
ALT SERPL W/O P-5'-P-CCNC: 11 U/L (ref 10–44)
AMPHET+METHAMPHET UR QL: ABNORMAL
ANION GAP SERPL CALC-SCNC: 12 MMOL/L (ref 8–16)
AST SERPL-CCNC: 13 U/L (ref 10–40)
BARBITURATES UR QL SCN>200 NG/ML: NEGATIVE
BASOPHILS # BLD AUTO: 0.02 K/UL (ref 0–0.2)
BASOPHILS NFR BLD: 0.4 % (ref 0–1.9)
BENZODIAZ UR QL SCN>200 NG/ML: ABNORMAL
BILIRUB SERPL-MCNC: 0.4 MG/DL (ref 0.1–1)
BUN SERPL-MCNC: 10 MG/DL (ref 6–20)
BZE UR QL SCN: NEGATIVE
CALCIUM SERPL-MCNC: 8.8 MG/DL (ref 8.7–10.5)
CANNABINOIDS UR QL SCN: ABNORMAL
CHLORIDE SERPL-SCNC: 104 MMOL/L (ref 95–110)
CK SERPL-CCNC: 72 U/L (ref 20–200)
CO2 SERPL-SCNC: 17 MMOL/L (ref 23–29)
CREAT SERPL-MCNC: 1 MG/DL (ref 0.5–1.4)
CREAT UR-MCNC: 35 MG/DL (ref 23–375)
DIFFERENTIAL METHOD: ABNORMAL
EOSINOPHIL # BLD AUTO: 0 K/UL (ref 0–0.5)
EOSINOPHIL NFR BLD: 0.4 % (ref 0–8)
ERYTHROCYTE [DISTWIDTH] IN BLOOD BY AUTOMATED COUNT: 12.9 % (ref 11.5–14.5)
EST. GFR  (NO RACE VARIABLE): >60 ML/MIN/1.73 M^2
ETHANOL SERPL-MCNC: <10 MG/DL
GLUCOSE SERPL-MCNC: 99 MG/DL (ref 70–110)
HCT VFR BLD AUTO: 44.6 % (ref 40–54)
HGB BLD-MCNC: 15 G/DL (ref 14–18)
IMM GRANULOCYTES # BLD AUTO: 0.01 K/UL (ref 0–0.04)
IMM GRANULOCYTES NFR BLD AUTO: 0.2 % (ref 0–0.5)
LYMPHOCYTES # BLD AUTO: 0.9 K/UL (ref 1–4.8)
LYMPHOCYTES NFR BLD: 17.6 % (ref 18–48)
MCH RBC QN AUTO: 31.3 PG (ref 27–31)
MCHC RBC AUTO-ENTMCNC: 33.6 G/DL (ref 32–36)
MCV RBC AUTO: 93 FL (ref 82–98)
METHADONE UR QL SCN>300 NG/ML: NEGATIVE
MONOCYTES # BLD AUTO: 0.4 K/UL (ref 0.3–1)
MONOCYTES NFR BLD: 7.1 % (ref 4–15)
NEUTROPHILS # BLD AUTO: 3.8 K/UL (ref 1.8–7.7)
NEUTROPHILS NFR BLD: 74.3 % (ref 38–73)
NRBC BLD-RTO: 0 /100 WBC
OPIATES UR QL SCN: NEGATIVE
PCP UR QL SCN>25 NG/ML: NEGATIVE
PLATELET # BLD AUTO: 240 K/UL (ref 150–450)
PMV BLD AUTO: 9.4 FL (ref 9.2–12.9)
POTASSIUM SERPL-SCNC: 4.1 MMOL/L (ref 3.5–5.1)
PROT SERPL-MCNC: 7.2 G/DL (ref 6–8.4)
RBC # BLD AUTO: 4.79 M/UL (ref 4.6–6.2)
SODIUM SERPL-SCNC: 133 MMOL/L (ref 136–145)
TOXICOLOGY INFORMATION: ABNORMAL
TROPONIN I SERPL DL<=0.01 NG/ML-MCNC: <0.006 NG/ML (ref 0–0.03)
WBC # BLD AUTO: 5.05 K/UL (ref 3.9–12.7)

## 2023-12-09 PROCEDURE — 80053 COMPREHEN METABOLIC PANEL: CPT | Performed by: EMERGENCY MEDICINE

## 2023-12-09 PROCEDURE — 85025 COMPLETE CBC W/AUTO DIFF WBC: CPT | Performed by: EMERGENCY MEDICINE

## 2023-12-09 PROCEDURE — 96361 HYDRATE IV INFUSION ADD-ON: CPT

## 2023-12-09 PROCEDURE — 86361 T CELL ABSOLUTE COUNT: CPT | Performed by: EMERGENCY MEDICINE

## 2023-12-09 PROCEDURE — 25000003 PHARM REV CODE 250: Performed by: EMERGENCY MEDICINE

## 2023-12-09 PROCEDURE — 80307 DRUG TEST PRSMV CHEM ANLYZR: CPT | Performed by: EMERGENCY MEDICINE

## 2023-12-09 PROCEDURE — 84484 ASSAY OF TROPONIN QUANT: CPT | Performed by: EMERGENCY MEDICINE

## 2023-12-09 PROCEDURE — 96374 THER/PROPH/DIAG INJ IV PUSH: CPT

## 2023-12-09 PROCEDURE — 82077 ASSAY SPEC XCP UR&BREATH IA: CPT | Performed by: EMERGENCY MEDICINE

## 2023-12-09 PROCEDURE — 99285 EMERGENCY DEPT VISIT HI MDM: CPT | Mod: 25

## 2023-12-09 PROCEDURE — 63600175 PHARM REV CODE 636 W HCPCS: Performed by: EMERGENCY MEDICINE

## 2023-12-09 PROCEDURE — 82550 ASSAY OF CK (CPK): CPT | Performed by: EMERGENCY MEDICINE

## 2023-12-09 RX ORDER — LEVETIRACETAM 500 MG/5ML
1500 INJECTION, SOLUTION, CONCENTRATE INTRAVENOUS
Status: COMPLETED | OUTPATIENT
Start: 2023-12-09 | End: 2023-12-09

## 2023-12-09 RX ORDER — PRIMIDONE 50 MG/1
50 TABLET ORAL DAILY
COMMUNITY
Start: 2023-11-29

## 2023-12-09 RX ORDER — PREGABALIN 150 MG/1
150 CAPSULE ORAL 2 TIMES DAILY
COMMUNITY

## 2023-12-09 RX ORDER — HALOPERIDOL 10 MG/1
10 TABLET ORAL
COMMUNITY
Start: 2023-11-16

## 2023-12-09 RX ADMIN — SODIUM CHLORIDE 1000 ML: 9 INJECTION, SOLUTION INTRAVENOUS at 08:12

## 2023-12-09 RX ADMIN — LEVETIRACETAM 1500 MG: 500 INJECTION, SOLUTION INTRAVENOUS at 03:12

## 2023-12-09 NOTE — ED PROVIDER NOTES
"  Source of History:  Medical record, patient  Independent history obtained from : EMS    Chief complaint:  Per triage note: "Seizures (Pt presents to the ER via EMS with complaints of seizures. Pt was found to be actively seizing upon EMS arrival; EMS reports a partial focal seizure.  Pt received Versed 10mg IV per EMS; sedated/postictal upon arrival./)  "    HPI:    Patient presents with witnessed seizure. Pt was talking with his neighbor at her house when he had tonic-clonic seizures x2. Upon EMS arrival, pt had continued seizure activity with increased tone, deviated gaze, jaw clenching. Obvious seizure activity stopped after 10mg versed IV.   History limited by patient condition.       ROS:   See HPI for pertinent review of systems      Review of patient's allergies indicates:   Allergen Reactions    Clindamycin      Per report from [facility]:  As per HPI and below:  Past Medical History:   Diagnosis Date    Bipolar 1 disorder     PHU (generalized anxiety disorder)     Human immunodeficiency virus (HIV) disease     normal CD4 as of 12/22    Panic disorder     Substance use disorder        No past surgical history on file.    Social History     Tobacco Use    Smoking status: Some Days     Types: Cigarettes    Smokeless tobacco: Never   Substance Use Topics    Alcohol use: Yes     Comment: occasionally     Drug use: No       Physical Exam:      Nursing note and vitals reviewed.  BP (!) 101/57   Pulse 71   Temp 97.5 °F (36.4 °C)   Resp 15   Ht 6' 1" (1.854 m)   Wt 83.9 kg (185 lb)   SpO2 100%   BMI 24.41 kg/m²     Constitutional: clinically intoxicated. No distress.  Eyes: EOMI. No discharge. Anicteric.  HENT:   Neck: Normal range of motion. Neck supple.  No midline spinal tenderness, step-offs, or deformities.  Cardiovascular: Normal rate. No murmur, no gallop and no friction rub heard.   Pulmonary/Chest: No respiratory distress. Effort normal. No wheezes, no rales, no rhonchi.   Abdominal: Bowel " "sounds normal. Soft. No distension and no mass. There is no tenderness.   Musculoskeletal: Normal range of motion.  Neurological: GCS 14. Somnulent. No gross cranial nerve, light touch or strength deficit.   Skin: Skin is warm and dry.   EXT: 2+ radial pulses.   Psychiatric: Clinically intoxicated.         Medical Decision Making / Independent Interpretations / External Records Reviewed:      ED Course as of 12/09/23 2128   Sat Dec 09, 2023   1445 Patient is a 33-year-old male with HIV (normal CD4),  seizure disorder (on Ativan, Lyrica), history of substance induced mood disorder, alcohol use disorder, anxiety who presents with witnessed seizure. Pt was talking with his neighbor at her house when he had tonic-clonic seizures x2. Upon EMS arrival, pt had increased tone, deviated gaze, jaw clenching. Obvious seizure activity stopped after 10mg versed IV.   Pt currently somnulent, maintaining airway, with no focal movements or abnormal eye movements.   Ddx included epileptic seizure, withdrawal seizure. Patient does not appear to be in status epilepticus.    [RC]   1555 --  EKG Interpretation: I independently reviewed and interpreted EKG which shows sinus tachycardia at 76 beats per minute, no STEMI, no ischemic changes, normal intervals.   No acute change compared to prior tracing.  EKG was performed at 1425, however the tracing had no patient identifiers and was not brought to a provider.   --     [RC]   1635 I independently reviewed and interpreted CXR which shows no pneumothorax, no focal consolidation, no cardiomegaly, no acute process.  I independently reviewed and interpreted CT head which shows no acute intracranial bleeding, mass, skull fracture, or acute intracranial process.     [RC]   2001 Patient awake, alert, fully oriented.  He states that he feels sleepy, and very hungry.  He states that last night "was a weird night." He went to his neighbor's house to have pasta, had aura and apparently had a seizure. "  He states that he was recently changed to primidone for his seizures, but this makes him excessively sleepy, so he avoids taking it.  He states that he has a regular neurologist.  He states that he currently feels at his baseline apart from sleepiness, and being very hungry.  He feels comfortable with discharge.  --  I discussed with pt and/or guardian/caretaker that this evaluation in the ED does not suggest any emergent or life threatening medical condition requiring admission or further immediate intervention or diagnostics. Regardless, an unremarkable evaluation in the ED does not preclude the development or presence of a serious or life threatening condition. Pt was instructed to return for any worsening, new, changed, or concerning symptoms.     I had a detailed discussion with patient and/or guardian/caretaker regarding findings, plan, return precautions, importance of medication adherence, need to follow-up with a PCP and specialist. All questions answered.     Note was created using voice recognition software. It may have occasional typographical errors not identified and edited despite initial review prior to signing.      =====================================  Critical Care:  35 minutes total critical care time was personally spent by me, exclusive of procedures and separately billable time.   Critical care was necessary to treat or prevent imminent or life-threatening deterioration of the following conditions:  Seizure, status epilepticus   =====================================     [RC]   2109 Will continue to observe in the ER due to multiple low blood pressure readings.  [RC]   2127 Blood pressure normalized.  [RC]      ED Course User Index  [RC] Jerod Simental MD       --  I decided to obtain the patient's medical records. I reviewed patient's prior external notes / results: external hospital emergency department encounter.   --  Additional Medical Decision Making: Drug therapy requiring intensive  monitoring for toxicity and Hospitalization or escalation of care considered    Medications   sodium chloride 0.9% bolus 1,000 mL 1,000 mL (1,000 mLs Intravenous New Bag 12/9/23 2044)   levETIRAcetam injection 1,500 mg (1,500 mg Intravenous Given 12/9/23 1500)   sodium chloride 0.9% bolus 1,000 mL 1,000 mL (0 mLs Intravenous Stopped 12/9/23 1800)              No future appointments.     Diagnostic Impression:    1. Breakthrough seizure    2. Altered mental status         ED Disposition Condition    Discharge Good          ED Prescriptions    None       Follow-up Information       Follow up With Specialties Details Why Contact Info    Your primary care doctor  In 1 week For recheck with your primary care doctor     Your neurologist  Schedule an appointment as soon as possible for a visit  For recheck and possible medication changes with specialist                Jerod Simental MD  12/09/23 2034       Jerod Simental MD  12/09/23 2128

## 2023-12-10 ENCOUNTER — HOSPITAL ENCOUNTER (EMERGENCY)
Facility: HOSPITAL | Age: 33
Discharge: HOME | End: 2023-12-11
Attending: STUDENT IN AN ORGANIZED HEALTH CARE EDUCATION/TRAINING PROGRAM
Payer: MEDICAID

## 2023-12-10 DIAGNOSIS — G40.909 RECURRENT SEIZURES: Primary | ICD-10-CM

## 2023-12-10 LAB
ALBUMIN SERPL BCP-MCNC: 3.1 G/DL (ref 3.5–5.2)
ALP SERPL-CCNC: 88 U/L (ref 55–135)
ALT SERPL W/O P-5'-P-CCNC: 9 U/L (ref 10–44)
ANION GAP SERPL CALC-SCNC: 10 MMOL/L (ref 8–16)
AST SERPL-CCNC: 12 U/L (ref 10–40)
BASOPHILS # BLD AUTO: 0.04 K/UL (ref 0–0.2)
BASOPHILS NFR BLD: 0.7 % (ref 0–1.9)
BILIRUB SERPL-MCNC: 0.2 MG/DL (ref 0.1–1)
BUN SERPL-MCNC: 9 MG/DL (ref 6–20)
CALCIUM SERPL-MCNC: 7.9 MG/DL (ref 8.7–10.5)
CHLORIDE SERPL-SCNC: 111 MMOL/L (ref 95–110)
CO2 SERPL-SCNC: 18 MMOL/L (ref 23–29)
CREAT SERPL-MCNC: 0.7 MG/DL (ref 0.5–1.4)
DIFFERENTIAL METHOD: ABNORMAL
EOSINOPHIL # BLD AUTO: 0.1 K/UL (ref 0–0.5)
EOSINOPHIL NFR BLD: 1.7 % (ref 0–8)
ERYTHROCYTE [DISTWIDTH] IN BLOOD BY AUTOMATED COUNT: 12.9 % (ref 11.5–14.5)
EST. GFR  (NO RACE VARIABLE): >60 ML/MIN/1.73 M^2
GLUCOSE SERPL-MCNC: 90 MG/DL (ref 70–110)
HCT VFR BLD AUTO: 35.9 % (ref 40–54)
HGB BLD-MCNC: 12.3 G/DL (ref 14–18)
IMM GRANULOCYTES # BLD AUTO: 0.01 K/UL (ref 0–0.04)
IMM GRANULOCYTES NFR BLD AUTO: 0.2 % (ref 0–0.5)
LYMPHOCYTES # BLD AUTO: 1.4 K/UL (ref 1–4.8)
LYMPHOCYTES NFR BLD: 23.4 % (ref 18–48)
MCH RBC QN AUTO: 31.7 PG (ref 27–31)
MCHC RBC AUTO-ENTMCNC: 34.3 G/DL (ref 32–36)
MCV RBC AUTO: 93 FL (ref 82–98)
MONOCYTES # BLD AUTO: 0.6 K/UL (ref 0.3–1)
MONOCYTES NFR BLD: 9.9 % (ref 4–15)
NEUTROPHILS # BLD AUTO: 3.7 K/UL (ref 1.8–7.7)
NEUTROPHILS NFR BLD: 64.1 % (ref 38–73)
NRBC BLD-RTO: 0 /100 WBC
PLATELET # BLD AUTO: 212 K/UL (ref 150–450)
PMV BLD AUTO: 9.4 FL (ref 9.2–12.9)
POCT GLUCOSE: 114 MG/DL (ref 70–110)
POTASSIUM SERPL-SCNC: 3.6 MMOL/L (ref 3.5–5.1)
PROT SERPL-MCNC: 5.5 G/DL (ref 6–8.4)
RBC # BLD AUTO: 3.88 M/UL (ref 4.6–6.2)
SODIUM SERPL-SCNC: 139 MMOL/L (ref 136–145)
WBC # BLD AUTO: 5.77 K/UL (ref 3.9–12.7)

## 2023-12-10 PROCEDURE — 80164 ASSAY DIPROPYLACETIC ACD TOT: CPT | Performed by: EMERGENCY MEDICINE

## 2023-12-10 PROCEDURE — 85025 COMPLETE CBC W/AUTO DIFF WBC: CPT | Performed by: EMERGENCY MEDICINE

## 2023-12-10 PROCEDURE — 82962 GLUCOSE BLOOD TEST: CPT

## 2023-12-10 PROCEDURE — 80053 COMPREHEN METABOLIC PANEL: CPT | Performed by: EMERGENCY MEDICINE

## 2023-12-10 PROCEDURE — 99285 EMERGENCY DEPT VISIT HI MDM: CPT

## 2023-12-10 NOTE — DISCHARGE INSTRUCTIONS
"Thank you for letting us take care of you today! It was nice meeting you, and I hope you feel better soon.     Take your anti-seizure medication as directed.   Do not drive, operate heavy machinery, work at heights, swim, take baths, or do any dangerous activity until cleared by a neurologist. No driving x6 months by [facility] state law. Avoid cooking on stovetop. If needed, use back burners. Microwaving preferred  For any activity, ask yourself "What would happen if I had a seizure while doing this?"       Call your primary care doctor to make the first available appointment.     Keep all your medical appointments.     Take your regular medication as prescribed. Contact your primary care provider before running out of medication, or for any problems obtaining them.    Do not drive or operate heavy machinery while taking opioid or muscle relaxing medications. Examples include norco, percocet, xanax, valium, flexeril.     Overuse or long term use of pain and sedating medication may lead to addiction, dependence, organ failure, family and work problems, legal problems, accidental overdose, and death.    If you do not have health insurance, you probably can afford it:  Call 1-382.663.5170 ([facility] hotline) or go to www.[place].gov    Your evaluation in the ER does not suggest any emergent or life threatening medical condition requiring admission or immediate intervention beyond that provided in the ER.   However, the signs of a serious problem sometimes take more time to appear.     Do not hesitate to return to the ER if any of the following occur:    Weakness, dizziness, fainting, or loss of consciousness   Fever of 100.4ºF (38ºC) or higher  Any worse symptoms  Any new or concerning symptoms    "

## 2023-12-11 VITALS
BODY MASS INDEX: 24.41 KG/M2 | RESPIRATION RATE: 14 BRPM | SYSTOLIC BLOOD PRESSURE: 135 MMHG | TEMPERATURE: 98 F | OXYGEN SATURATION: 95 % | HEART RATE: 66 BPM | DIASTOLIC BLOOD PRESSURE: 60 MMHG | WEIGHT: 185 LBS

## 2023-12-11 LAB
CD3+CD4+ CELLS # BLD: 384 CELLS/UL (ref 300–1400)
CD3+CD4+ CELLS NFR BLD: 41.7 % (ref 28–57)
VALPROATE SERPL-MCNC: <12.5 UG/ML (ref 50–100)

## 2023-12-11 PROCEDURE — 25500020 PHARM REV CODE 255: Performed by: STUDENT IN AN ORGANIZED HEALTH CARE EDUCATION/TRAINING PROGRAM

## 2023-12-11 PROCEDURE — 25000003 PHARM REV CODE 250

## 2023-12-11 PROCEDURE — A9585 GADOBUTROL INJECTION: HCPCS | Performed by: STUDENT IN AN ORGANIZED HEALTH CARE EDUCATION/TRAINING PROGRAM

## 2023-12-11 RX ORDER — LEVETIRACETAM 500 MG/1
500 TABLET ORAL 2 TIMES DAILY
Qty: 60 TABLET | Refills: 11 | Status: SHIPPED | OUTPATIENT
Start: 2023-12-11 | End: 2023-12-11 | Stop reason: SDUPTHER

## 2023-12-11 RX ORDER — LEVETIRACETAM 500 MG/1
500 TABLET ORAL 2 TIMES DAILY
Qty: 60 TABLET | Refills: 11 | Status: SHIPPED | OUTPATIENT
Start: 2023-12-11 | End: 2024-12-10

## 2023-12-11 RX ORDER — GADOBUTROL 604.72 MG/ML
8 INJECTION INTRAVENOUS
Status: COMPLETED | OUTPATIENT
Start: 2023-12-11 | End: 2023-12-11

## 2023-12-11 RX ORDER — LEVETIRACETAM 500 MG/1
500 TABLET ORAL
Status: COMPLETED | OUTPATIENT
Start: 2023-12-11 | End: 2023-12-11

## 2023-12-11 RX ADMIN — LEVETIRACETAM 500 MG: 500 TABLET, FILM COATED ORAL at 04:12

## 2023-12-11 RX ADMIN — GADOBUTROL 8 ML: 604.72 INJECTION INTRAVENOUS at 02:12

## 2023-12-11 NOTE — DISCHARGE INSTRUCTIONS
Diagnosis: Seizures    Tests today showed:   Labs Reviewed   CBC W/ AUTO DIFFERENTIAL - Abnormal; Notable for the following components:       Result Value    RBC 3.88 (*)     Hemoglobin 12.3 (*)     Hematocrit 35.9 (*)     MCH 31.7 (*)     All other components within normal limits   COMPREHENSIVE METABOLIC PANEL - Abnormal; Notable for the following components:    Chloride 111 (*)     CO2 18 (*)     Calcium 7.9 (*)     Total Protein 5.5 (*)     Albumin 3.1 (*)     ALT 9 (*)     All other components within normal limits   VALPROIC ACID - Abnormal; Notable for the following components:    Valproic Acid Level <12.5 (*)     All other components within normal limits    Narrative:     ADD ON VALPROIC ACID PER DR ELVA VANEGAS/ORDER# [id number] @ 00:41   POCT GLUCOSE - Abnormal; Notable for the following components:    POCT Glucose 114 (*)     All other components within normal limits   VALPROIC ACID   POCT GLUCOSE MONITORING CONTINUOUS     Imaging Results              MRI Brain W WO Contrast (Final result)  Result time 12/11/23 03:47:47      Final result by David Garcia MD (12/11/23 03:47:47)                   Impression:      No acute intracranial abnormality.    Electronically signed by resident: Estrada Adamson  Date:    12/11/2023  Time:    03:04    Electronically signed by: David Garcia MD  Date:    12/11/2023  Time:    03:47               Narrative:    EXAMINATION:  MRI BRAIN W WO CONTRAST    CLINICAL HISTORY:  Seizure, new-onset, no history of trauma;increase in seizure frequency, Hx of HIV, +meth;.    TECHNIQUE:  Multiplanar multisequence MR imaging of the brain was performed before and after the administration of 8 mL Gadavist intravenous contrast.    COMPARISON:  CT head 12/09/2023.    FINDINGS:  Brain parenchyma is unremarkable.  No diffusion restriction to suggest acute infarct. No areas of gradient susceptibility to suggest intraparenchymal hemorrhage.  No mass lesion or edema.  Partially empty sella  configuration, as seen on prior studies.  Postcontrast images demonstrate no abnormal enhancement.    Ventricles and sulci are normal in size and midline for age without evidence of hydrocephalus.    No extra-axial blood or fluid collections.    Normal T2 skull base vascular flow voids are preserved.    Bone marrow signal intensity is within normal limits.  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  No dural venous sinus thrombosis identified.                                      Treatments you had today:   Medications   gadobutroL (GADAVIST) injection 8 mL (8 mLs Intravenous Given 12/11/23 0250)       Home Care Instructions:  - Start taking Keppra for seizures two times daily as directed  - Understand that drug use can lower your seizure threshold  - Do not drive or operate heavy machinery or swim until evaluated by neurology    Follow-Up Plan:  - Follow-up with: Primary care doctor within 3 - 5 days, and Neurology  - Additional testing and/or evaluation as directed by your primary doctor    Return to the Emergency Department for symptoms including: worsening symptoms, worsening headache or a new headache which is severe, headache with vision changes, headache with neck stiffness or fever, numbness or tingling, weakness, problems with coordination, speech changes, vomiting with inability to hold down fluids, severe headache different from previous headaches, dizziness, passing out/fainting/unconsciousness, or other concerning symptoms.

## 2023-12-11 NOTE — ED TRIAGE NOTES
Flako Pearce, a 33 y.o. male presents to the ED w/ complaint of seizures. 1 witnessed seizure at home tonight. Received 2.5 mg versed with EMS. Pt drowsy, AAOx4, GCS of 15 at this time. Hx of seizures.     Triage note:  Chief Complaint   Patient presents with    Seizures     Received 2.5 of versed with EMS, Pt AAOx4, GCS of 15 at this time. Hx of seizures, discharged yesterday for seizures. Neighbor witnessed seizures at home tonight.      Review of patient's allergies indicates:   Allergen Reactions    Clindamycin      Per report from [facility]     Past Medical History:   Diagnosis Date    Bipolar 1 disorder     PHU (generalized anxiety disorder)     Human immunodeficiency virus (HIV) disease     normal CD4 as of 12/22    Panic disorder     Substance use disorder

## 2023-12-11 NOTE — ED PROVIDER NOTES
Encounter Date: 12/10/2023       History     Chief Complaint   Patient presents with    Seizures     Received 2.5 of versed with EMS, Pt AAOx4, GCS of 15 at this time. Hx of seizures, discharged yesterday for seizures. Neighbor witnessed seizures at home tonight.      33-year-old male with a past medical history of bipolar 1 disorder, HIV positive, epilepsy, panic disorder, polysubstance use disorder presents with a chief complaint of seizure.  The patient has had multiple breakthrough seizures in the past few days.  The most recent, his neighbor reportedly witnessed.  The patient says that he has been compliant with his Depakote.  He also says that he has recently used methamphetamines but is denying any other drug use.  He is also denying any recent trauma.    The history is provided by the patient. No  was used.     Review of patient's allergies indicates:   Allergen Reactions    Clindamycin      Per report from [facility]     Past Medical History:   Diagnosis Date    Bipolar 1 disorder     PHU (generalized anxiety disorder)     Human immunodeficiency virus (HIV) disease     normal CD4 as of 12/22    Panic disorder     Substance use disorder      No past surgical history on file.  No family history on file.  Social History     Tobacco Use    Smoking status: Some Days     Types: Cigarettes    Smokeless tobacco: Never   Substance Use Topics    Alcohol use: Yes     Comment: occasionally     Drug use: No     Review of Systems    Physical Exam     Initial Vitals [12/10/23 2150]   BP Pulse Resp Temp SpO2   122/70 80 16 97.7 °F (36.5 °C) 98 %      MAP       --         Physical Exam    Nursing note and vitals reviewed.  Constitutional: He appears well-developed and well-nourished. He is not diaphoretic. No distress.   HENT:   Head: Normocephalic and atraumatic.   Eyes: EOM are normal. Pupils are equal, round, and reactive to light. Right eye exhibits no discharge. Left eye exhibits no discharge.   Neck:  Neck supple.   Cardiovascular:  Normal rate, regular rhythm, normal heart sounds and intact distal pulses.           Pulmonary/Chest: Breath sounds normal. No respiratory distress. He has no wheezes. He has no rhonchi. He has no rales. He exhibits no tenderness.   Abdominal: Abdomen is soft. There is no abdominal tenderness. There is no rebound and no guarding.   Musculoskeletal:         General: No tenderness or edema. Normal range of motion.      Cervical back: Neck supple.     Neurological: He is alert and oriented to person, place, and time. He has normal strength. GCS score is 15. GCS eye subscore is 4. GCS verbal subscore is 5. GCS motor subscore is 6.   Skin: Skin is warm and dry. Capillary refill takes less than 2 seconds. No rash noted. No erythema. No pallor.   Psychiatric: He has a normal mood and affect. His behavior is normal. Judgment and thought content normal.         ED Course   Procedures  Labs Reviewed   CBC W/ AUTO DIFFERENTIAL - Abnormal; Notable for the following components:       Result Value    RBC 3.88 (*)     Hemoglobin 12.3 (*)     Hematocrit 35.9 (*)     MCH 31.7 (*)     All other components within normal limits   COMPREHENSIVE METABOLIC PANEL - Abnormal; Notable for the following components:    Chloride 111 (*)     CO2 18 (*)     Calcium 7.9 (*)     Total Protein 5.5 (*)     Albumin 3.1 (*)     ALT 9 (*)     All other components within normal limits   VALPROIC ACID - Abnormal; Notable for the following components:    Valproic Acid Level <12.5 (*)     All other components within normal limits    Narrative:     ADD ON VALPROIC ACID PER DR ELVA VANEGAS/ORDER# [id number] @ 00:41   POCT GLUCOSE - Abnormal; Notable for the following components:    POCT Glucose 114 (*)     All other components within normal limits   VALPROIC ACID   POCT GLUCOSE MONITORING CONTINUOUS          Imaging Results              MRI Brain W WO Contrast (Final result)  Result time 12/11/23 03:47:47      Final result by  David Garcia MD (12/11/23 03:47:47)                   Impression:      No acute intracranial abnormality.    Electronically signed by resident: Estrada Adamson  Date:    12/11/2023  Time:    03:04    Electronically signed by: David Garcia MD  Date:    12/11/2023  Time:    03:47               Narrative:    EXAMINATION:  MRI BRAIN W WO CONTRAST    CLINICAL HISTORY:  Seizure, new-onset, no history of trauma;increase in seizure frequency, Hx of HIV, +meth;.    TECHNIQUE:  Multiplanar multisequence MR imaging of the brain was performed before and after the administration of 8 mL Gadavist intravenous contrast.    COMPARISON:  CT head 12/09/2023.    FINDINGS:  Brain parenchyma is unremarkable.  No diffusion restriction to suggest acute infarct. No areas of gradient susceptibility to suggest intraparenchymal hemorrhage.  No mass lesion or edema.  Partially empty sella configuration, as seen on prior studies.  Postcontrast images demonstrate no abnormal enhancement.    Ventricles and sulci are normal in size and midline for age without evidence of hydrocephalus.    No extra-axial blood or fluid collections.    Normal T2 skull base vascular flow voids are preserved.    Bone marrow signal intensity is within normal limits.  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  No dural venous sinus thrombosis identified.                                       Medications   gadobutroL (GADAVIST) injection 8 mL (8 mLs Intravenous Given 12/11/23 0250)   levETIRAcetam tablet 500 mg (500 mg Oral Given 12/11/23 0418)     Medical Decision Making  See ED course for remainder of care    Amount and/or Complexity of Data Reviewed  Labs: ordered.  Radiology: ordered.    Risk  Prescription drug management.               ED Course as of 12/11/23 0615   Mon Dec 11, 2023   0606 33-year-old male in no acute distress.  Patient was seen yesterday at [facility], [place], operated by [facility] for the same complaint, labs were unremarkable, U tox  was positive for amphetamines, benzodiazepines and marijuana.  Noncontrast CT scan was negative at that time.  Differential includes but is not limited to breakthrough seizure versus medication noncompliance versus brain mass versus brain abscess, doubt meningitis, patient does not have any nuchal rigidity or fever or headache. [BP]   0612 MRI Brain W WO Contrast  There was no evidence of intracranial mass, infection or structural abnormality [BP]   0613 Valproic Acid Lvl(!): <12.5  Subtherapeutic level [BP]   0614 I told the patient that we did not find any emergent cause for his breakthrough seizures.  His likely combination of subtherapeutic valproic acid level coupled with recent drug use.  We will start him on Keppra and provide outpatient Neurology referral.  Also recommended him following up with his primary care physician and counseled him on the risks of drug use and its influence on seizure threshold.  The patient expressed understanding, answered all of his questions.  I provided him with discharge paperwork and he was discharged in stable condition. [BP]      ED Course User Index  [BP] Mele Mccann MD                           Clinical Impression:  Final diagnoses:  [G40.909] Recurrent seizures (Primary)          ED Disposition Condition    Discharge Stable          ED Prescriptions       Medication Sig Dispense Start Date End Date Auth. Provider    levETIRAcetam (KEPPRA) 500 MG Tab  (Status: Discontinued) Take 1 tablet (500 mg total) by mouth 2 (two) times daily. 60 tablet 12/11/2023 12/11/2023 Mele Mccann MD    levETIRAcetam (KEPPRA) 500 MG Tab Take 1 tablet (500 mg total) by mouth 2 (two) times daily. 60 tablet 12/11/2023 12/10/2024 Mele Mccann MD          Follow-up Information       Follow up With Specialties Details Why Contact Info    [facility] - Emergency Dept Emergency Medicine  As needed, If symptoms worsen [address]  [facility] [id number] [phone]              Mele Mccann MD  Resident  12/11/23 0632

## 2024-01-01 PROBLEM — Z13.9 ENCOUNTER FOR MEDICAL SCREENING EXAMINATION: Status: RESOLVED | Noted: 2023-09-27 | Resolved: 2024-01-01

## 2024-06-07 ENCOUNTER — HOSPITAL ENCOUNTER (EMERGENCY)
Facility: OTHER | Age: 34
Discharge: HOME | End: 2024-06-07
Attending: EMERGENCY MEDICINE
Payer: MEDICAID

## 2024-06-07 VITALS
WEIGHT: 165 LBS | SYSTOLIC BLOOD PRESSURE: 130 MMHG | BODY MASS INDEX: 21.87 KG/M2 | HEART RATE: 68 BPM | OXYGEN SATURATION: 99 % | DIASTOLIC BLOOD PRESSURE: 75 MMHG | HEIGHT: 73 IN | RESPIRATION RATE: 18 BRPM | TEMPERATURE: 98 F

## 2024-06-07 DIAGNOSIS — G24.9 DYSTONIA: ICD-10-CM

## 2024-06-07 DIAGNOSIS — G40.909 SEIZURE DISORDER: Primary | ICD-10-CM

## 2024-06-07 LAB — POCT GLUCOSE: 82 MG/DL (ref 70–110)

## 2024-06-07 PROCEDURE — 82962 GLUCOSE BLOOD TEST: CPT

## 2024-06-07 PROCEDURE — 99283 EMERGENCY DEPT VISIT LOW MDM: CPT

## 2024-06-07 PROCEDURE — 25000003 PHARM REV CODE 250: Performed by: EMERGENCY MEDICINE

## 2024-06-07 RX ORDER — DIPHENHYDRAMINE HCL 25 MG
25 CAPSULE ORAL
Status: COMPLETED | OUTPATIENT
Start: 2024-06-07 | End: 2024-06-07

## 2024-06-07 RX ADMIN — DIPHENHYDRAMINE HYDROCHLORIDE 25 MG: 25 CAPSULE ORAL at 08:06

## 2024-06-08 NOTE — ED NOTES
Patient trying to act as if he is having a seizure when pt's name is called pt stops the odd moving and is able to talk in full sentences, pt able to stop the unnecessary movement and ambulated out of ER with steady gait.

## 2024-06-08 NOTE — ED PROVIDER NOTES
Encounter Date: 6/7/2024    SCRIBE #1 NOTE: I, Breanna Gu, am scribing for, and in the presence of,  Lynn Arango MD. I have scribed the following portions of the note - Other sections scribed: HPI,ROS, and PE.       History     Chief Complaint   Patient presents with    Seizures     Pt arrived via EMS for seizure, per EMS a friend called 911 5-7 lasting 2min-7min. Pt told EMS that he has been having absence seizures for 3 days, pt went to another ER today for same and was discharged, pt has maintained a GCS 15, no postictal status      Time seen by provider: 7:50 PM    This is a 33 y.o. male who presents via EMS with complaint of recurrent seizures, onset four days ago. Patient reports being currently on Keppra and clonazepam for seizures, which have been taken as prescribed today. He describes experiencing episodes where he enters trances, characterized by his eyes rolling back as if attempting to look behind him and his jaw remaining open. He requires external stimuli, such as tapping, to emerge from these episodes. Accompanied symptoms include rhinorrhea and sore throat. He denies any cough. Pt acknowledges a history of substance use but denies recent substance use. 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 Earlier today, the patient was seen at [facility], where his blood work returned normal. Of note, he denies having a neurologist. This is the extent of the patient's complaints at this time.        The history is provided by the patient and medical records. No  was used.     Review of patient's allergies indicates:   Allergen Reactions    Clindamycin      Per report from [facility]     Past Medical History:   Diagnosis Date    Bipolar 1 disorder     PHU (generalized anxiety disorder)     Human immunodeficiency virus (HIV) disease     normal CD4 as of 12/22    Panic disorder     Substance use disorder      History  reviewed. No pertinent surgical history.  No family history on file.  Social History     Tobacco Use    Smoking status: Some Days     Types: Cigarettes    Smokeless tobacco: Never   Substance Use Topics    Alcohol use: Yes     Comment: occasionally     Drug use: No     Review of Systems   Constitutional:  Negative for activity change, appetite change, chills, diaphoresis and fever.   HENT:  Positive for rhinorrhea and sore throat. Negative for congestion and trouble swallowing.    Eyes:  Negative for photophobia and visual disturbance.   Respiratory:  Negative for cough, chest tightness and shortness of breath.    Cardiovascular:  Negative for chest pain.   Gastrointestinal:  Negative for abdominal pain, nausea and vomiting.   Endocrine: Negative for polydipsia, polyphagia and polyuria.   Genitourinary:  Negative for difficulty urinating and flank pain.   Musculoskeletal:  Negative for back pain and neck pain.   Skin:  Negative for pallor.   Neurological:  Positive for seizures. Negative for weakness and headaches.   Psychiatric/Behavioral:  Negative for confusion.        Physical Exam     Initial Vitals [06/07/24 1936]   BP Pulse Resp Temp SpO2   134/77 64 16 98 °F (36.7 °C) 100 %      MAP       --         Physical Exam    Constitutional: He appears well-developed. He appears listless. He is cooperative.   HENT:   Head: Atraumatic.   Eyes: Conjunctivae and lids are normal.   Neck:   Normal range of motion.  Cardiovascular:  Normal rate.           Musculoskeletal:         General: Normal range of motion.      Cervical back: Normal range of motion.     Neurological: He appears listless. No cranial nerve deficit or sensory deficit. He displays no seizure activity. GCS eye subscore is 4. GCS verbal subscore is 5. GCS motor subscore is 6.   Skin: No rash noted.   Psychiatric: He has a normal mood and affect. His speech is normal and behavior is normal.         ED Course   Procedures  Labs Reviewed   POCT GLUCOSE       "    Imaging Results    None          Medications   diphenhydrAMINE capsule 25 mg (has no administration in time range)     Medical Decision Making  33-year-old gentleman with reported seizure history, here with dystonic reaction.  Patient has no signs of status epilepticus, recent workup without lab abnormalities to suggest need for hospitalization at this time.  Patient requesting benzodiazepines by name and subsequent episodic pseudo-seizure movements noted.  Plan to discharge home with follow up Neurology.    Amount and/or Complexity of Data Reviewed  External Data Reviewed: notes.  Labs: ordered. Decision-making details documented in ED Course.    Risk  OTC drugs.            Scribe Attestation:   Scribe #1: I performed the above scribed service and the documentation accurately describes the services I performed. I attest to the accuracy of the note.    Physician Attestation for Scribe: I, Conchita, reviewed documentation as scribed in my presence, which is both accurate and complete.      ED Course as of 06/07/24 2028 Fri Jun 07, 2024 1958 Urgent evaluation of 33-year-old gentleman here with complaint of persistent seizures.  Patient does have a history of seizure disorder, reportedly compliant with his Keppra.  Patient reports taking clonazepam as well, but not recently.  Patient does not follow with neurologist.  Currently the patient is requesting Versed or clonazepam by name to help with his symptoms.  Reports that he can not stop opening his mouth, or looking upwards, which he demonstrates while speaking with me.  Of note patient was seen at [facility] in the emergency department this afternoon with labs obtained, urinalysis notable for benzos, THC, methadone, amphetamines as well.  However the patient denies substance use recently.  No history of head trauma, and endorses taking his Keppra today.  All discussing with me the patient reports having an "episode", however the patient has no loss of " consciousness, no generalized shaking movements or evidence of active seizure.  I do not suspect serious intracranial pathology at this time nor status epilepticus to require administration of benzos as requested.  Nor do I believe that repeat workup is necessary at this time.  Patient given food here in the emergency department and discharged with plan to follow up with Neurology. [DM]   2024 Patient seen to be stretching out in the stretcher, though stopped these movements upon distraction.  Suspect acute dystonia with plan to administer Benadryl and DC home. [DM]      ED Course User Index  [DM] Lynn Arango MD                           Clinical Impression:  Final diagnoses:  [G40.909] Seizure disorder (Primary)  [G24.9] Dystonia                 Lynn Arango MD  06/07/24 2028

## 2024-08-20 ENCOUNTER — HOSPITAL ENCOUNTER (EMERGENCY)
Facility: HOSPITAL | Age: 34
Discharge: HOME | End: 2024-08-20
Attending: EMERGENCY MEDICINE
Payer: MEDICAID

## 2024-08-20 VITALS
OXYGEN SATURATION: 98 % | RESPIRATION RATE: 16 BRPM | HEIGHT: 73 IN | HEART RATE: 80 BPM | WEIGHT: 175 LBS | TEMPERATURE: 98 F | BODY MASS INDEX: 23.19 KG/M2 | SYSTOLIC BLOOD PRESSURE: 118 MMHG | DIASTOLIC BLOOD PRESSURE: 70 MMHG

## 2024-08-20 DIAGNOSIS — M25.572 ANKLE PAIN, LEFT: Primary | ICD-10-CM

## 2024-08-20 PROBLEM — F10.20 ALCOHOLISM: Status: ACTIVE | Noted: 2018-10-30

## 2024-08-20 PROBLEM — B02.9 HERPES ZOSTER WITHOUT COMPLICATION: Status: ACTIVE | Noted: 2021-11-17

## 2024-08-20 PROBLEM — R21 DIFFUSE PAPULAR RASH: Status: ACTIVE | Noted: 2022-07-23

## 2024-08-20 PROBLEM — Z86.19 HISTORY OF SYPHILIS: Status: ACTIVE | Noted: 2021-11-19

## 2024-08-20 PROBLEM — F32.A DEPRESSIVE DISORDER: Status: ACTIVE | Noted: 2018-10-30

## 2024-08-20 PROBLEM — L73.9 FOLLICULITIS: Status: ACTIVE | Noted: 2022-07-30

## 2024-08-20 LAB
ALBUMIN SERPL BCP-MCNC: 3.9 G/DL (ref 3.5–5.2)
ALP SERPL-CCNC: 92 U/L (ref 55–135)
ALT SERPL W/O P-5'-P-CCNC: 14 U/L (ref 10–44)
AMPHET+METHAMPHET UR QL: ABNORMAL
ANION GAP SERPL CALC-SCNC: 10 MMOL/L (ref 8–16)
APPEARANCE FLD: NORMAL
AST SERPL-CCNC: 22 U/L (ref 10–40)
BARBITURATES UR QL SCN>200 NG/ML: NEGATIVE
BASOPHILS # BLD AUTO: 0.04 K/UL (ref 0–0.2)
BASOPHILS NFR BLD: 0.4 % (ref 0–1.9)
BENZODIAZ UR QL SCN>200 NG/ML: ABNORMAL
BILIRUB SERPL-MCNC: 0.4 MG/DL (ref 0.1–1)
BODY FLD TYPE: NORMAL
BODY FLD TYPE: NORMAL
BUN SERPL-MCNC: 9 MG/DL (ref 6–20)
BZE UR QL SCN: ABNORMAL
CALCIUM SERPL-MCNC: 9.4 MG/DL (ref 8.7–10.5)
CANNABINOIDS UR QL SCN: NEGATIVE
CHLORIDE SERPL-SCNC: 105 MMOL/L (ref 95–110)
CO2 SERPL-SCNC: 20 MMOL/L (ref 23–29)
COLOR FLD: NORMAL
CREAT SERPL-MCNC: 1.2 MG/DL (ref 0.5–1.4)
CREAT UR-MCNC: 110 MG/DL (ref 23–375)
CRP SERPL-MCNC: 24.3 MG/L (ref 0–8.2)
CRYSTALS FLD MICRO: NEGATIVE
DIFFERENTIAL METHOD BLD: ABNORMAL
EOSINOPHIL # BLD AUTO: 0.1 K/UL (ref 0–0.5)
EOSINOPHIL NFR BLD: 1.3 % (ref 0–8)
ERYTHROCYTE [DISTWIDTH] IN BLOOD BY AUTOMATED COUNT: 13.2 % (ref 11.5–14.5)
EST. GFR  (NO RACE VARIABLE): >60 ML/MIN/1.73 M^2
GLUCOSE SERPL-MCNC: 99 MG/DL (ref 70–110)
GRAM STN SPEC: NORMAL
GRAM STN SPEC: NORMAL
HCT VFR BLD AUTO: 41.1 % (ref 40–54)
HCV AB SERPL QL IA: NORMAL
HGB BLD-MCNC: 13.2 G/DL (ref 14–18)
IMM GRANULOCYTES # BLD AUTO: 0.03 K/UL (ref 0–0.04)
IMM GRANULOCYTES NFR BLD AUTO: 0.3 % (ref 0–0.5)
LYMPHOCYTES # BLD AUTO: 1.8 K/UL (ref 1–4.8)
LYMPHOCYTES NFR BLD: 16.8 % (ref 18–48)
LYMPHOCYTES NFR FLD MANUAL: 12 %
MCH RBC QN AUTO: 30.8 PG (ref 27–31)
MCHC RBC AUTO-ENTMCNC: 32.1 G/DL (ref 32–36)
MCV RBC AUTO: 96 FL (ref 82–98)
METHADONE UR QL SCN>300 NG/ML: NEGATIVE
MONOCYTES # BLD AUTO: 1.2 K/UL (ref 0.3–1)
MONOCYTES NFR BLD: 11 % (ref 4–15)
MONOS+MACROS NFR FLD MANUAL: 14 %
NEUTROPHILS # BLD AUTO: 7.4 K/UL (ref 1.8–7.7)
NEUTROPHILS NFR BLD: 70.2 % (ref 38–73)
NEUTROPHILS NFR FLD MANUAL: 74 %
NRBC BLD-RTO: 0 /100 WBC
OPIATES UR QL SCN: NEGATIVE
PCP UR QL SCN>25 NG/ML: NEGATIVE
PLATELET # BLD AUTO: 272 K/UL (ref 150–450)
PMV BLD AUTO: 9.5 FL (ref 9.2–12.9)
POTASSIUM SERPL-SCNC: 3.9 MMOL/L (ref 3.5–5.1)
PROT SERPL-MCNC: 7.2 G/DL (ref 6–8.4)
RBC # BLD AUTO: 4.29 M/UL (ref 4.6–6.2)
SODIUM SERPL-SCNC: 135 MMOL/L (ref 136–145)
TOXICOLOGY INFORMATION: ABNORMAL
URATE SERPL-MCNC: 4.8 MG/DL (ref 3.4–7)
WBC # BLD AUTO: 10.47 K/UL (ref 3.9–12.7)
WBC # FLD: 315 /CU MM

## 2024-08-20 PROCEDURE — 80053 COMPREHEN METABOLIC PANEL: CPT | Performed by: PHYSICIAN ASSISTANT

## 2024-08-20 PROCEDURE — 87070 CULTURE OTHR SPECIMN AEROBIC: CPT

## 2024-08-20 PROCEDURE — 87116 MYCOBACTERIA CULTURE: CPT

## 2024-08-20 PROCEDURE — 87205 SMEAR GRAM STAIN: CPT

## 2024-08-20 PROCEDURE — 89060 EXAM SYNOVIAL FLUID CRYSTALS: CPT

## 2024-08-20 PROCEDURE — 86803 HEPATITIS C AB TEST: CPT | Performed by: PHYSICIAN ASSISTANT

## 2024-08-20 PROCEDURE — 87102 FUNGUS ISOLATION CULTURE: CPT

## 2024-08-20 PROCEDURE — 85025 COMPLETE CBC W/AUTO DIFF WBC: CPT | Performed by: PHYSICIAN ASSISTANT

## 2024-08-20 PROCEDURE — 80307 DRUG TEST PRSMV CHEM ANLYZR: CPT | Performed by: PHYSICIAN ASSISTANT

## 2024-08-20 PROCEDURE — 89051 BODY FLUID CELL COUNT: CPT

## 2024-08-20 PROCEDURE — 99285 EMERGENCY DEPT VISIT HI MDM: CPT | Mod: 25

## 2024-08-20 PROCEDURE — 25000003 PHARM REV CODE 250: Performed by: PHYSICIAN ASSISTANT

## 2024-08-20 PROCEDURE — 96374 THER/PROPH/DIAG INJ IV PUSH: CPT

## 2024-08-20 PROCEDURE — 86140 C-REACTIVE PROTEIN: CPT | Performed by: PHYSICIAN ASSISTANT

## 2024-08-20 PROCEDURE — 20605 DRAIN/INJ JOINT/BURSA W/O US: CPT | Mod: LT

## 2024-08-20 PROCEDURE — 87075 CULTR BACTERIA EXCEPT BLOOD: CPT

## 2024-08-20 PROCEDURE — 84550 ASSAY OF BLOOD/URIC ACID: CPT | Performed by: PHYSICIAN ASSISTANT

## 2024-08-20 PROCEDURE — 63600175 PHARM REV CODE 636 W HCPCS

## 2024-08-20 PROCEDURE — 96372 THER/PROPH/DIAG INJ SC/IM: CPT | Performed by: PHYSICIAN ASSISTANT

## 2024-08-20 PROCEDURE — 63600175 PHARM REV CODE 636 W HCPCS: Performed by: PHYSICIAN ASSISTANT

## 2024-08-20 PROCEDURE — 87206 SMEAR FLUORESCENT/ACID STAI: CPT

## 2024-08-20 RX ORDER — HYDROMORPHONE HYDROCHLORIDE 1 MG/ML
0.5 INJECTION, SOLUTION INTRAMUSCULAR; INTRAVENOUS; SUBCUTANEOUS
Status: COMPLETED | OUTPATIENT
Start: 2024-08-20 | End: 2024-08-20

## 2024-08-20 RX ORDER — ONDANSETRON 8 MG/1
8 TABLET, ORALLY DISINTEGRATING ORAL
Status: COMPLETED | OUTPATIENT
Start: 2024-08-20 | End: 2024-08-20

## 2024-08-20 RX ORDER — POLYETHYLENE GLYCOL 3350 17 G/17G
17 POWDER, FOR SOLUTION ORAL DAILY
Qty: 7 EACH | Refills: 0 | Status: SHIPPED | OUTPATIENT
Start: 2024-08-20 | End: 2024-08-27

## 2024-08-20 RX ORDER — KETOROLAC TROMETHAMINE 10 MG/1
10 TABLET, FILM COATED ORAL EVERY 6 HOURS PRN
Qty: 20 TABLET | Refills: 0 | Status: SHIPPED | OUTPATIENT
Start: 2024-08-20 | End: 2024-08-25

## 2024-08-20 RX ORDER — SULFAMETHOXAZOLE AND TRIMETHOPRIM 800; 160 MG/1; MG/1
2 TABLET ORAL 2 TIMES DAILY
Qty: 28 TABLET | Refills: 0 | Status: SHIPPED | OUTPATIENT
Start: 2024-08-20 | End: 2024-08-27

## 2024-08-20 RX ORDER — OXYCODONE HYDROCHLORIDE 5 MG/1
5 TABLET ORAL
Status: COMPLETED | OUTPATIENT
Start: 2024-08-20 | End: 2024-08-20

## 2024-08-20 RX ORDER — KETOROLAC TROMETHAMINE 10 MG/1
10 TABLET, FILM COATED ORAL
Status: COMPLETED | OUTPATIENT
Start: 2024-08-20 | End: 2024-08-20

## 2024-08-20 RX ORDER — KETOROLAC TROMETHAMINE 30 MG/ML
10 INJECTION, SOLUTION INTRAMUSCULAR; INTRAVENOUS
Status: DISCONTINUED | OUTPATIENT
Start: 2024-08-20 | End: 2024-08-20

## 2024-08-20 RX ORDER — ACETAMINOPHEN AND CODEINE PHOSPHATE 300; 30 MG/1; MG/1
1 TABLET ORAL EVERY 4 HOURS PRN
Qty: 10 TABLET | Refills: 0 | Status: SHIPPED | OUTPATIENT
Start: 2024-08-20 | End: 2024-08-23

## 2024-08-20 RX ORDER — ONDANSETRON 4 MG/1
4 TABLET, ORALLY DISINTEGRATING ORAL EVERY 6 HOURS PRN
Qty: 15 TABLET | Refills: 0 | Status: SHIPPED | OUTPATIENT
Start: 2024-08-20

## 2024-08-20 RX ADMIN — HYDROMORPHONE HYDROCHLORIDE 0.5 MG: 1 INJECTION, SOLUTION INTRAMUSCULAR; INTRAVENOUS; SUBCUTANEOUS at 04:08

## 2024-08-20 RX ADMIN — HYDROMORPHONE HYDROCHLORIDE 0.5 MG: 1 INJECTION, SOLUTION INTRAMUSCULAR; INTRAVENOUS; SUBCUTANEOUS at 10:08

## 2024-08-20 RX ADMIN — ONDANSETRON 8 MG: 8 TABLET, ORALLY DISINTEGRATING ORAL at 05:08

## 2024-08-20 RX ADMIN — KETOROLAC TROMETHAMINE 10 MG: 10 TABLET, FILM COATED ORAL at 10:08

## 2024-08-20 RX ADMIN — OXYCODONE 5 MG: 5 TABLET ORAL at 03:08

## 2024-08-20 NOTE — ED PROVIDER NOTES
Encounter Date: 8/20/2024       History     Chief Complaint   Patient presents with    Foot Pain     States put socks on and thinks spider in inside, both lower legs swollen     34-year-old male with a PMHx of substance use disorder, HIV, bipolar 1 disorder, panic disorder presents to ED with severe left ankle pain x2 days.  He noticed mild pain in his ankle last night prior to leaving for work.  By this morning he was unable to walk on affected foot.  He has associated ankle swelling.  On Sunday he used IV cocaine near left ankle.  Per patient's CD4 count is undetectable.  He denies history of inflammatory arthritis.  He is concerned that it may be a spider though did not see a spider.  Denies fever.    The history is provided by the patient.     Review of patient's allergies indicates:   Allergen Reactions    Clindamycin      Per report from [facility]     Past Medical History:   Diagnosis Date    Bipolar 1 disorder     PHU (generalized anxiety disorder)     Human immunodeficiency virus (HIV) disease     normal CD4 as of 12/22    Panic disorder     Substance use disorder      History reviewed. No pertinent surgical history.  No family history on file.  Social History     Tobacco Use    Smoking status: Some Days     Types: Cigarettes    Smokeless tobacco: Never   Substance Use Topics    Alcohol use: Yes     Comment: occasionally     Drug use: No     Review of Systems   Constitutional:  Negative for fever.   Musculoskeletal:  Positive for arthralgias, joint swelling and myalgias.   Skin:  Negative for color change.       Physical Exam     Initial Vitals [08/20/24 1258]   BP Pulse Resp Temp SpO2   139/67 93 18 98.7 °F (37.1 °C) 100 %      MAP       --         Physical Exam    Nursing note and vitals reviewed.  Constitutional: He appears well-developed and well-nourished. He is not diaphoretic. No distress.   HENT:   Head: Normocephalic and atraumatic.   Nose: Nose normal.   Eyes: Conjunctivae and EOM are normal.   Neck:  Neck supple.   Cardiovascular:  Normal rate.           Pulmonary/Chest: No respiratory distress.   Musculoskeletal:      Cervical back: Neck supple.      Right lower leg: No edema.      Left lower leg: No edema.      Left ankle: Swelling present. Tenderness present. Decreased range of motion.      Left foot: Swelling and tenderness present.      Comments: Left DP pulse itnact. Skin intact. Warm to touch. No erythema      Neurological: He is alert and oriented to person, place, and time.   Skin: No bruising and no rash noted. No erythema.   Psychiatric: He has a normal mood and affect. Thought content normal.         ED Course   Procedures  Labs Reviewed   CBC W/ AUTO DIFFERENTIAL - Abnormal       Result Value    WBC 10.47      RBC 4.29 (*)     Hemoglobin 13.2 (*)     Hematocrit 41.1      MCV 96      MCH 30.8      MCHC 32.1      RDW 13.2      Platelets 272      MPV 9.5      Immature Granulocytes 0.3      Gran # (ANC) 7.4      Immature Grans (Abs) 0.03      Lymph # 1.8      Mono # 1.2 (*)     Eos # 0.1      Baso # 0.04      nRBC 0      Gran % 70.2      Lymph % 16.8 (*)     Mono % 11.0      Eosinophil % 1.3      Basophil % 0.4      Differential Method Automated     COMPREHENSIVE METABOLIC PANEL - Abnormal    Sodium 135 (*)     Potassium 3.9      Chloride 105      CO2 20 (*)     Glucose 99      BUN 9      Creatinine 1.2      Calcium 9.4      Total Protein 7.2      Albumin 3.9      Total Bilirubin 0.4      Alkaline Phosphatase 92      AST 22      ALT 14      eGFR >60.0      Anion Gap 10     C-REACTIVE PROTEIN - Abnormal    CRP 24.3 (*)    DRUG SCREEN PANEL, URINE EMERGENCY - Abnormal    Benzodiazepines Presumptive Positive (*)     Methadone metabolites Negative      Cocaine (Metab.) Presumptive Positive (*)     Opiate Scrn, Ur Negative      Barbiturate Screen, Ur Negative      Amphetamine Screen, Ur Presumptive Positive (*)     THC Negative      Phencyclidine Negative      Creatinine, Urine 110.0      Toxicology  Information SEE COMMENT      Narrative:     Specimen Source->Urine   GRAM STAIN    Gram Stain Result No WBC's      Gram Stain Result No organisms seen     CULTURE, AEROBIC  (SPECIFY SOURCE)    Aerobic Bacterial Culture No growth     AFB CULTURE & SMEAR   CULTURE, FUNGUS   CULTURE, ANAEROBIC   HEPATITIS C ANTIBODY    Hepatitis C Ab Non-reactive      Narrative:     Release to patient->Immediate   URIC ACID    Uric Acid 4.8     T-HELPER CELLS (CD4) COUNT   WBC & DIFF, BODY FLUID    Body Fluid Type Joint Fluid, Left Ankle      Fluid Appearance Turbid      Fluid Color Red      WBC, Body Fluid 315      Segs, Fluid 74      Lymphs, Fluid 12      Monocytes/Macrophages, Fluid 14     BODY FLUID CRYSTAL    Body Fluid Source, Crystal Exam Other (Specify)      Body Fluid Crystal Negative      Narrative:     L ankle   PATHOLOGIST REVIEW, BODY FLUID    Pathologist Review, Body Fluid REVIEWED      Narrative:     L ankle   T-HELPER CELLS (CD4) COUNT          Imaging Results              X-Ray Ankle Complete Left (Final result)  Result time 08/20/24 15:33:03      Final result by Raffy Quevedo MD (08/20/24 15:33:03)                   Impression:      No fracture or dislocation.  There is medial soft tissue swelling.      Electronically signed by: Rafyf Quevedo  Date:    08/20/2024  Time:    15:33               Narrative:    EXAMINATION:  XR ANKLE COMPLETE 3 VIEW LEFT    CLINICAL HISTORY:  Pain in left ankle and joints of left foot    TECHNIQUE:  AP, lateral and oblique views of the left ankle were performed.    COMPARISON:  None    FINDINGS:  The ankle mortise width is symmetric.  No talar dome  osteochondral lesion appreciated.  No acute fracture, dislocation, or osseous destructive process.  No ankle joint effusion.    There is soft tissue swelling overlying the medial malleolus.                                       Medications   oxyCODONE immediate release tablet 5 mg (5 mg Oral Given 8/20/24 1513)   HYDROmorphone injection 0.5 mg  (0.5 mg Intravenous Given 8/20/24 1633)   ondansetron disintegrating tablet 8 mg (8 mg Oral Given 8/20/24 1745)   ketorolac tablet 10 mg (10 mg Oral Given 8/20/24 2206)   HYDROmorphone injection 0.5 mg (0.5 mg Intramuscular Given 8/20/24 2206)     Medical Decision Making  34-year-old male with a PMHx of substance use disorder, HIV, bipolar 1 disorder, panic disorder presents to ED with severe left ankle pain x2 days. Nontoxic appearing. Hemodynamically stable. Afebrile. Exam as above. I will initiate workup and reassess.    Ddx:  Septic arthritis, osteoarthritis, inflammatory arthritis, fracture, abscess, cellulitis    - Labs without leukocytosis   - CRP is elevated   - Xray negative for fracture, soft tissue gas, other acute findings  - I discussed with orthopedics who plans for arthrocentesis   - I will sign out to on coming ED provider due to shift change. Pending orthopedics recommendations      Amount and/or Complexity of Data Reviewed  Labs: ordered. Decision-making details documented in ED Course.  Radiology: ordered.    Risk  OTC drugs.  Prescription drug management.               ED Course as of 08/21/24 0854   Tue Aug 20, 2024   1508 WBC: 10.47 [HM]   1532 CRP(!): 24.3 [HM]   2055 GRAM STAIN: No WBC's [CC]   2055 GRAM STAIN: No organisms seen [CC]   2123 Discussed with orthopedics, OK to d/c cell count not consistent with septic joint   [CC]      ED Course User Index  [CC] Jaqui Hamlin PA-C  [HM] Eli Morales PA-C                           Clinical Impression:  Final diagnoses:  [M25.572] Ankle pain, left (Primary)          ED Disposition Condition    Discharge Stable          ED Prescriptions       Medication Sig Dispense Start Date End Date Auth. Provider    sulfamethoxazole-trimethoprim 800-160mg (BACTRIM DS) 800-160 mg Tab Take 2 tablets by mouth 2 (two) times daily. for 7 days 28 tablet 8/20/2024 8/27/2024 Jaqui Hamlin PA-C    ketorolac (TORADOL) 10 mg tablet Take 1 tablet (10  mg total) by mouth every 6 (six) hours as needed for Pain. 20 tablet 8/20/2024 8/25/2024 Jaqui Hamlin PA-C    ondansetron (ZOFRAN-ODT) 4 MG TbDL Take 1 tablet (4 mg total) by mouth every 6 (six) hours as needed (nausea). 15 tablet 8/20/2024 -- Jaqui Hamlin PA-C    acetaminophen-codeine 300-30mg (TYLENOL #3) 300-30 mg Tab Take 1 tablet by mouth every 4 (four) hours as needed (Pain). 10 tablet 8/20/2024 8/23/2024 Jaqui Hamlin PA-C    polyethylene glycol (GLYCOLAX) 17 gram PwPk Take 17 g by mouth once daily. for 7 days 7 each 8/20/2024 8/27/2024 Jaqui Hamlin PA-C          Follow-up Information       Follow up With Specialties Details Why Contact [facility] - Family Family Medicine Schedule an appointment as soon as possible for a visit in 1 week  [address] [address]  [phone]      [facility] Emergency Medicine Go to  If symptoms worsen [address] [facility] [address] [phone]             Eli Morales PA-C  08/20/24 2003       Nate Blancas III, MD  08/21/24 0854

## 2024-08-20 NOTE — ED NOTES
"Patient states left inner ankle pain, thinks he has a " brown recluse" bite, reports he walked and rode his bike this am   "

## 2024-08-20 NOTE — Clinical Note
"Flako Jiang"Ramses was seen and treated in our emergency department on 8/20/2024.  He may return with limitations on 08/21/2024.  Mr. Pearce may need to sit down more often at work as his ankle is injured.     Sincerely,      Jaqui Hamlin PA-C    "

## 2024-08-20 NOTE — ED NOTES
Patient identifiers verified and correct for  Mr Pearce  C/C: Pain to left foot, SEE NN  APPEARANCE: Awake and alert in NAD. PAIN  10/10  SKIN: warm, dry and intact. No breakdown or bruising.  MUSCULOSKELETAL: Patient moving all extremities spontaneously, swelling noted to left foot . Ambulates independently.  RESPIRATORY: Denies shortness of breath.Respirations unlabored.   CARDIAC: Denies CP, 2+ distal pulses; no peripheral edema  ABDOMEN: S/ND/NT, Denies nausea  : voids spontaneously, denies difficulty  Neurologic: AAO x 4; follows commands equal strength in all extremities; denies numbness/tingling. Denies dizziness  Denies new wekaness

## 2024-08-20 NOTE — ED NOTES
Unable to obtain PIV/blood very poor venous access with mult pinpoint areas on arms, second nurse in to assess

## 2024-08-20 NOTE — ED NOTES
Patient states IVDU in same foot ( left)  Saturday, used cocaine Ketamine and Klonopin, patient states he is  unable to obtain in jannet arms, patient speaking fast but calm,

## 2024-08-20 NOTE — ED NOTES
Patient identifiers verified and correct for  Mr Pearce  C/C: Left inner ankle pain SEE NN  APPEARANCE: awake and alert in NAD. PAIN  10/10  SKIN: warm, dry slight redness and swelling noted to inner ankle with small redn area noted, SEE PHOTOS   MUSCULOSKELETAL: Patient moving all extremities spontaneously, slight swelling noted . Ambulates independently with assit due to pain   RESPIRATORY: Denies shortness of breath.Respirations unlabored.   CARDIAC: Denies CP, 2+ distal pulses; no peripheral edema  ABDOMEN: S/ND/NT, Denies nausea  : voids spontaneously, denies difficulty  Neurologic: AAO x 4; follows commands equal strength in all extremities; denies numbness/tingling. Denies dizziness  Deneis new weakness

## 2024-08-21 LAB
ACID FAST MOD KINY STN SPEC: NORMAL
MYCOBACTERIUM SPEC QL CULT: NORMAL
PATH INTERP FLD-IMP: NORMAL

## 2024-08-21 NOTE — DISCHARGE INSTRUCTIONS
Diagnosis: Ankle Pain    I think your pain may be due to infection of the skin but there is no evidence for infection of the joint itself. I am prescribing antibiotics as well as medicine for pain and nausea.    Please schedule a follow up appointment with your primary care doctor. If you start to have worsening symptoms, please return to the emergency department.

## 2024-08-21 NOTE — CONSULTS
Flako Pearce is a 34 y.o. male with Pmhx of IVDU presenting with left ankle pain and swelling onset 3 days ago after injecting ketamine and cocaine through the ankle. Pain is 10/10. They are unable to bear weight on the joint. They denied a history of septic arthritis or gout flare. They denied fever, chills, nausea or vomiting. They denied an open wound around the joint. Patient denies numbness and tingling. Denies any other musculoskeletal pain or injuries.      They report IV drug use.  They deny tobacco use.   They deny alcohol use.   They are on HIV medication  They deny chemotherapy.  They deny radiation therapy.      Review of patient's allergies indicates:   Allergen Reactions    Clindamycin      Per report from [facility]       Past Medical History:   Diagnosis Date    Bipolar 1 disorder     PHU (generalized anxiety disorder)     Human immunodeficiency virus (HIV) disease     normal CD4 as of 12/22    Panic disorder     Substance use disorder      History reviewed. No pertinent surgical history.  No family history on file.  Social History     Tobacco Use    Smoking status: Some Days     Types: Cigarettes    Smokeless tobacco: Never   Substance Use Topics    Alcohol use: Yes     Comment: occasionally     Drug use: No        Review of Systems:  Constitutional: Negative for fevers and chills  HENT: Negative for congestion and headaches   Eyes: Negative for blurred vision   Cardiovascular: Negative for chest pain and palpitations  Respiratory: Negative for cough and shortness of breath   Hematologic/Lymphatic: Negative for bleeding problem. Does not bruise/bleed easily  Skin: Negative for dry skin and rash  Musculoskeletal: Positive for wrist pain; negative for back pain  Gastrointestinal: Negative for abdominal pain and n/v/d  Neurological: Negative for numbness and paresthesias     OBJECTIVE:     Vital Signs:  Temp:  [98 °F (36.7 °C)-98.7 °F (37.1 °C)] 98 °F (36.7 °C)  Pulse:  [80-93] 80  Resp:  [16-18]  16  SpO2:  [98 %-100 %] 98 %  BP: (118-139)/(65-70) 118/70    Physical Exam    Gen:  No acute distress, well-developed, well nourished.  CV:  Peripherally well-perfused. 2+ radial pulses, symmetric.  Respiratory:  Normal respiratory effort. No accessory muscle use.   Head/Neck:  Normocephalic.  Atraumatic. Sclera anicteric. TM. Neck supple.  Neuro: No FND. Awake. Alert. Oriented to person, place,\ time, and situation.  Abdomen: Soft, NTND.    MSK:  Right Lower Extremity  Inspection  - Skin intact throughout, no open wounds  - No swelling  - No ecchymosis, erythema, or signs of cellulitis  Palpation  - NonTTP throughout, no palpable abnormality   Range of motion  - AROM and PROM of the hip, knee, ankle, and foot intact  Stability  - No evidence of joint dislocation or abnormal laxity,   Neurovascular  - TA/EHL/Gastroc/FHL assessed in isolation without deficit  - SILT throughout  - Compartments soft  - DP 2+   - Negative Log roll  - Negative Stinchfield  - Muscle tone normal    Left Lower Extremity  Inspection  - Skin intact throughout, no open wounds  - ankle swelling present prominent at the medial ankle  - mild underlying cellulitis at the ankle  Palpation  - TTP at the ankle  Range of motion  - AROM and PROM of the hip, knee intact.  - AROM of the ankle limited due to pain  Stability  - No evidence of joint dislocation or abnormal laxity,   Neurovascular  - TA/EHL/Gastroc/FHL assessed in isolation without deficit  - SILT throughout  - Compartments soft  - DP 2+   - Negative Log roll  - Negative Stinchfield  - Muscle tone normal    Assessment & Plan    Flako Pearce is a 34 y.o. male w/history of IVDU presenting with left ankle pain after injecting ketamine through his medial ankle 2 days ago.  Ortho consulted for rule out septic arthritis of the septic arthritis of the ankle, with exam significant for painful ROM of the ankle.  The left ankle was aspirated at the bedside (see procedure note for further  details), with results indicating patient does not have septic arthritis. Recommend multimodal pain regimen and treatment of mild underlying cellulitis. No further orthopedic intervention anticipated        Joint Fluid Analysis:   Cultures pending, will continue to follow   Lab Results   Component Value Date    BODYFLUIDTYP Joint Fluid, Left Ankle 08/20/2024    WBCBF 315 08/20/2024    SEGS 74 08/20/2024    CRYST Negative 08/20/2024    LABGRAM No WBC's 08/20/2024    LABGRAM No organisms seen 08/20/2024     Lab Results   Component Value Date    WBC 10.47 08/20/2024    CRP 24.3 (H) 08/20/2024          Procedure Note:  After consent was obtained, the left ankle was prepped using sterile technique, and the joint was entered from the sudhir medial approach.  1 ml's of serosanguinous fluid was withdrawn and sent for analysis and culture. The procedure was well tolerated and performed without complications.

## 2024-08-23 LAB — BACTERIA SPEC AEROBE CULT: NO GROWTH

## 2024-08-26 LAB — FUNGUS SPEC CULT: NORMAL

## 2024-08-29 LAB — BACTERIA SPEC ANAEROBE CULT: NORMAL

## 2024-09-09 ENCOUNTER — HOSPITAL ENCOUNTER (EMERGENCY)
Facility: OTHER | Age: 34
Discharge: HOME | End: 2024-09-09
Attending: EMERGENCY MEDICINE
Payer: MEDICAID

## 2024-09-09 VITALS
WEIGHT: 180 LBS | TEMPERATURE: 97 F | HEIGHT: 73 IN | RESPIRATION RATE: 19 BRPM | SYSTOLIC BLOOD PRESSURE: 100 MMHG | OXYGEN SATURATION: 100 % | HEART RATE: 85 BPM | DIASTOLIC BLOOD PRESSURE: 65 MMHG | BODY MASS INDEX: 23.86 KG/M2

## 2024-09-09 DIAGNOSIS — G40.909 RECURRENT SEIZURES: Primary | ICD-10-CM

## 2024-09-09 DIAGNOSIS — L03.114 CELLULITIS OF LEFT HAND: ICD-10-CM

## 2024-09-09 LAB
ALBUMIN SERPL BCP-MCNC: 4.1 G/DL (ref 3.5–5.2)
ALP SERPL-CCNC: 87 U/L (ref 55–135)
ALT SERPL W/O P-5'-P-CCNC: 12 U/L (ref 10–44)
ANION GAP SERPL CALC-SCNC: 10 MMOL/L (ref 8–16)
AST SERPL-CCNC: 11 U/L (ref 10–40)
BASOPHILS # BLD AUTO: 0.02 K/UL (ref 0–0.2)
BASOPHILS NFR BLD: 0.2 % (ref 0–1.9)
BILIRUB SERPL-MCNC: 0.6 MG/DL (ref 0.1–1)
BUN SERPL-MCNC: 13 MG/DL (ref 6–20)
CALCIUM SERPL-MCNC: 9.9 MG/DL (ref 8.7–10.5)
CHLORIDE SERPL-SCNC: 103 MMOL/L (ref 95–110)
CO2 SERPL-SCNC: 24 MMOL/L (ref 23–29)
CREAT SERPL-MCNC: 1.2 MG/DL (ref 0.5–1.4)
DIFFERENTIAL METHOD BLD: ABNORMAL
EOSINOPHIL # BLD AUTO: 0.1 K/UL (ref 0–0.5)
EOSINOPHIL NFR BLD: 0.7 % (ref 0–8)
ERYTHROCYTE [DISTWIDTH] IN BLOOD BY AUTOMATED COUNT: 12.9 % (ref 11.5–14.5)
EST. GFR  (NO RACE VARIABLE): >60 ML/MIN/1.73 M^2
ETHANOL SERPL-MCNC: <10 MG/DL
GLUCOSE SERPL-MCNC: 177 MG/DL (ref 70–110)
HCT VFR BLD AUTO: 42.5 % (ref 40–54)
HGB BLD-MCNC: 14.4 G/DL (ref 14–18)
IMM GRANULOCYTES # BLD AUTO: 0.04 K/UL (ref 0–0.04)
IMM GRANULOCYTES NFR BLD AUTO: 0.4 % (ref 0–0.5)
LDH SERPL L TO P-CCNC: 1.82 MMOL/L (ref 0.5–2.2)
LYMPHOCYTES # BLD AUTO: 0.8 K/UL (ref 1–4.8)
LYMPHOCYTES NFR BLD: 8.6 % (ref 18–48)
MCH RBC QN AUTO: 30.6 PG (ref 27–31)
MCHC RBC AUTO-ENTMCNC: 33.9 G/DL (ref 32–36)
MCV RBC AUTO: 90 FL (ref 82–98)
MONOCYTES # BLD AUTO: 0.6 K/UL (ref 0.3–1)
MONOCYTES NFR BLD: 6.9 % (ref 4–15)
NEUTROPHILS # BLD AUTO: 7.5 K/UL (ref 1.8–7.7)
NEUTROPHILS NFR BLD: 83.2 % (ref 38–73)
NRBC BLD-RTO: 0 /100 WBC
PLATELET # BLD AUTO: 278 K/UL (ref 150–450)
PMV BLD AUTO: 9.4 FL (ref 9.2–12.9)
POTASSIUM SERPL-SCNC: 4.1 MMOL/L (ref 3.5–5.1)
PROT SERPL-MCNC: 7.8 G/DL (ref 6–8.4)
RBC # BLD AUTO: 4.7 M/UL (ref 4.6–6.2)
SAMPLE: NORMAL
SODIUM SERPL-SCNC: 137 MMOL/L (ref 136–145)
WBC # BLD AUTO: 9 K/UL (ref 3.9–12.7)

## 2024-09-09 PROCEDURE — 83605 ASSAY OF LACTIC ACID: CPT

## 2024-09-09 PROCEDURE — 99283 EMERGENCY DEPT VISIT LOW MDM: CPT

## 2024-09-09 PROCEDURE — 82077 ASSAY SPEC XCP UR&BREATH IA: CPT | Performed by: EMERGENCY MEDICINE

## 2024-09-09 PROCEDURE — 80177 DRUG SCRN QUAN LEVETIRACETAM: CPT | Performed by: EMERGENCY MEDICINE

## 2024-09-09 PROCEDURE — 99900035 HC TECH TIME PER 15 MIN (STAT)

## 2024-09-09 PROCEDURE — 80053 COMPREHEN METABOLIC PANEL: CPT | Performed by: EMERGENCY MEDICINE

## 2024-09-09 PROCEDURE — 85025 COMPLETE CBC W/AUTO DIFF WBC: CPT | Performed by: EMERGENCY MEDICINE

## 2024-09-09 PROCEDURE — 87040 BLOOD CULTURE FOR BACTERIA: CPT | Performed by: EMERGENCY MEDICINE

## 2024-09-09 NOTE — ED PROVIDER NOTES
"Chief complaint:  Seizures (Seizure on streetcar while on the way to work. Per EMS, BL arms swollen with possible IVDU sites, but pt denies drug use. Hx of seizures, compliant with meds. Pt pale and diaphoretic at this time.)      Source of information:  Patient, EMS, old chart    HPI:  Flako Pearce is a 34 y.o. male presenting with EMS after apparently having a seizure while on the street car.  Patient reports history of seizures, report taking his medications for these, Klonopin and Keppra.  Patient reports last seizure about 5 months ago.  Denies fever or recent URI type symptoms.  He is currently on Bactrim and doxycycline were cellulitis of the left hand which she states has been present for 2 weeks.  He arrived with a very tightly applied wrap to the left forearm.  Patient reports a history of HIV disease compliance with medications and an undetectable viral load.  Patient has extensive track marks on both arms, in when asked directly about IV drug abuse he states "no, I just like to play with needles.  When asked if he injects drugs while playing with needles he responds sort of and then elaborates that he likes to inject water.      ROS: As per HPI    Review of patient's allergies indicates:   Allergen Reactions    Clindamycin      Per report from [facility]       No current facility-administered medications on file prior to encounter.     Current Outpatient Medications on File Prior to Encounter   Medication Sig Dispense Refill    haloperidoL (HALDOL) 10 MG tablet Take 10 mg by mouth as needed (insomnia).      levETIRAcetam (KEPPRA) 500 MG Tab Take 1 tablet (500 mg total) by mouth 2 (two) times daily. 60 tablet 11    OLANZapine (ZYPREXA) 10 MG tablet Take 1 tablet (10 mg total) by mouth every evening. 30 tablet 1    ondansetron (ZOFRAN-ODT) 4 MG TbDL Take 1 tablet (4 mg total) by mouth every 6 (six) hours as needed (nausea). 15 tablet 0    pregabalin (LYRICA) 150 MG capsule Take 150 mg by mouth 2 " (two) times daily.      primidone (MYSOLINE) 50 MG Tab Take 50 mg by mouth once daily.         PMH:  As per HPI and below:  Past Medical History:   Diagnosis Date    Bipolar 1 disorder     PHU (generalized anxiety disorder)     Human immunodeficiency virus (HIV) disease     normal CD4 as of 12/22    Panic disorder     Substance use disorder      No past surgical history on file.      Physical Exam:    Vitals:    09/09/24 1802   BP: 100/65   Pulse: 85   Resp: 19   Temp:        General:  Disheveled.  Drowsy, falling asleep, but in no acute distress. Well developed. Well nourished.  Cool to touch.  Not diaphoretic.  Eyes: PERRL. EOM intact. no photophobia, no nystagmus  Conjunctivae - no pallor or icterus.   ENT: HEAD: Normal - atraumatic. Normal external ears. Normal nose.  No facial asymmetry. Mucous membranes - dry.  Neck: Neck supple. no meningismus. No cervical lymphadenopathy.  No thyromegaly.  No JVD.  Cardiovascular: Regular rate and rhythm. Normal S1 and S2. No murmur. No gallop. No rub.  2+ peripheral pulses  Respiratory: Normal breath sounds. No rales. No rhonchi. No wheezes. No tachypnea with exertion.  GI: Soft. Nontender. Nondistended. No guarding. No rebound. Normal BS.  Musculoskeletal:  Normal weight-bearing and gait No deformities. Normal ROM x4.   Integument:  Extensive scarring and both new and old bruising consistent with track marks.  The patient has soft tissue swelling of entire left hand, with erythema of dorsum consistent with cellulitis.  The patient also has well demarcated pale region from wrist to mid forearm from overly tight bandage.  No clubbing or cyanosis  Neurologic: No gross neurological deficits.    Psychiatric:  Oriented x3.  \speech is slow but accurate        Labs Reviewed   CBC W/ AUTO DIFFERENTIAL - Abnormal       Result Value    WBC 9.00      RBC 4.70      Hemoglobin 14.4      Hematocrit 42.5      MCV 90      MCH 30.6      MCHC 33.9      RDW 12.9      Platelets 278      MPV  9.4      Immature Granulocytes 0.4      Gran # (ANC) 7.5      Immature Grans (Abs) 0.04      Lymph # 0.8 (*)     Mono # 0.6      Eos # 0.1      Baso # 0.02      nRBC 0      Gran % 83.2 (*)     Lymph % 8.6 (*)     Mono % 6.9      Eosinophil % 0.7      Basophil % 0.2      Differential Method Automated     COMPREHENSIVE METABOLIC PANEL - Abnormal    Sodium 137      Potassium 4.1      Chloride 103      CO2 24      Glucose 177 (*)     BUN 13      Creatinine 1.2      Calcium 9.9      Total Protein 7.8      Albumin 4.1      Total Bilirubin 0.6      Alkaline Phosphatase 87      AST 11      ALT 12      eGFR >60      Anion Gap 10     CULTURE, BLOOD    Blood Culture, Routine No Growth to date      Blood Culture, Routine No Growth to date      Blood Culture, Routine No Growth to date      Narrative:     Blood Culture #1   CULTURE, BLOOD    Blood Culture, Routine No Growth to date      Blood Culture, Routine No Growth to date      Blood Culture, Routine No Growth to date      Narrative:     Blood Culture #2   ALCOHOL,MEDICAL (ETHANOL)    Alcohol, Serum <10     LEVETIRACETAM  (KEPPRA) LEVEL   ISTAT LACTATE    POC Lactate 1.82      Sample VENOUS         Medications - No data to display    Medical Decision Making  Differential diagnosis includes hypothermia, cellulitis, sepsis, recurrent seizure disorder, IV drug abuse    Amount and/or Complexity of Data Reviewed  Labs: ordered. Decision-making details documented in ED Course.    Risk  Decision regarding hospitalization.  Diagnosis or treatment significantly limited by social determinants of health.          MDM:    34 y.o. male with history of seizure disorder, had an apparent seizure.  He does report compliance with medications.  On exam he has got skin markings of IV drug abuse although patient strangely denies this stating he injects water.  He does have cellulitis of the dorsal left hand for which he is already on 2 antibiotics, he states it is getting better with decreasing  redness and swelling.  Initially wrapped very tightly, this was removed and constriction, duskiness of skin did resolve prior to discharge.  Initially hypothermic so broad workup begun Laboratory studies were reassuring without leukocytosis, normal lactic acid.  We did send blood cultures which are pending.  Patient did not have any further seizure activity.  Encouraged continued compliance and follow up with Neurology.  Discussed return precautions relating to seizures and cellulitis.    Medications - No data to display    ASSESSMENT:   1. Recurrent seizures    2. Cellulitis of left hand             Guille Jean Baptiste II, MD  09/12/24 5171

## 2024-09-09 NOTE — ED NOTES
Provided pt with turkey sandwich and water. Pt ambulated independently in room with even, steady gait. Denies dizziness.

## 2024-09-10 NOTE — ED TRIAGE NOTES
Pt presents to the ED with c/o witnessed seizures on the street car today. Pt diaphoretic upon assessment. Pt denies drug use. Pt AAOx3, NAD noted.

## 2024-09-13 LAB — LEVETIRACETAM SERPL-MCNC: <1 UG/ML (ref 3–60)

## 2024-09-14 LAB
BACTERIA BLD CULT: NORMAL
BACTERIA BLD CULT: NORMAL

## 2025-03-07 DIAGNOSIS — M25.569 KNEE PAIN, UNSPECIFIED CHRONICITY, UNSPECIFIED LATERALITY: Primary | ICD-10-CM

## 2025-04-02 ENCOUNTER — HOSPITAL ENCOUNTER (INPATIENT)
Facility: HOSPITAL | Age: 35
LOS: 1 days | Discharge: HOME | DRG: 100 | End: 2025-04-04
Attending: EMERGENCY MEDICINE
Payer: COMMERCIAL

## 2025-04-02 DIAGNOSIS — R00.0 TACHYCARDIA: ICD-10-CM

## 2025-04-02 DIAGNOSIS — R07.9 CHEST PAIN: ICD-10-CM

## 2025-04-02 DIAGNOSIS — R56.9 SEIZURE: Primary | ICD-10-CM

## 2025-04-02 DIAGNOSIS — F41.9 ANXIETY DISORDER, UNSPECIFIED TYPE: ICD-10-CM

## 2025-04-02 PROBLEM — R65.10 SIRS (SYSTEMIC INFLAMMATORY RESPONSE SYNDROME): Status: ACTIVE | Noted: 2025-04-02

## 2025-04-02 PROBLEM — A41.9 SEPSIS: Status: ACTIVE | Noted: 2025-04-02

## 2025-04-02 PROBLEM — E87.20 LACTIC ACIDOSIS: Status: ACTIVE | Noted: 2025-04-02

## 2025-04-02 PROBLEM — J11.1 INFLUENZA: Status: ACTIVE | Noted: 2025-04-02

## 2025-04-02 PROBLEM — R79.81 BORDERLINE LOW OXYGEN SATURATION LEVEL: Status: ACTIVE | Noted: 2025-04-02

## 2025-04-02 LAB
ABSOLUTE EOSINOPHIL (OHS): 0 K/UL
ABSOLUTE MONOCYTE (OHS): 1.15 K/UL (ref 0.3–1)
ABSOLUTE NEUTROPHIL COUNT (OHS): 13.1 K/UL (ref 1.8–7.7)
ALBUMIN SERPL BCP-MCNC: 4 G/DL (ref 3.5–5.2)
ALP SERPL-CCNC: 123 UNIT/L (ref 40–150)
ALT SERPL W/O P-5'-P-CCNC: 18 UNIT/L (ref 10–44)
AMPHET UR QL SCN: ABNORMAL
ANION GAP (OHS): 16 MMOL/L (ref 8–16)
AST SERPL-CCNC: 30 UNIT/L (ref 11–45)
BACTERIA #/AREA URNS AUTO: ABNORMAL /HPF
BARBITURATE SCN PRESENT UR: NEGATIVE
BASOPHILS # BLD AUTO: 0.02 K/UL
BASOPHILS NFR BLD AUTO: 0.1 %
BENZODIAZ UR QL SCN: ABNORMAL
BILIRUB SERPL-MCNC: 0.3 MG/DL (ref 0.1–1)
BILIRUB UR QL STRIP.AUTO: NEGATIVE
BIPAP: 0
BIPAP: 0
BNP SERPL-MCNC: <10 PG/ML (ref 0–99)
BUN SERPL-MCNC: 21 MG/DL (ref 6–20)
CALCIUM SERPL-MCNC: 10.1 MG/DL (ref 8.7–10.5)
CANNABINOIDS UR QL SCN: NEGATIVE
CHLORIDE SERPL-SCNC: 108 MMOL/L (ref 95–110)
CLARITY UR: CLEAR
CO2 SERPL-SCNC: 17 MMOL/L (ref 23–29)
COCAINE UR QL SCN: NEGATIVE
COLOR UR AUTO: YELLOW
CORRECTED TEMPERATURE (PCO2): 29.1 MMHG
CORRECTED TEMPERATURE (PCO2): 47.5 MMHG
CORRECTED TEMPERATURE (PH): 7.28
CORRECTED TEMPERATURE (PH): 7.41
CORRECTED TEMPERATURE (PO2): 120 MMHG
CORRECTED TEMPERATURE (PO2): 38.5 MMHG
CREAT SERPL-MCNC: 1.6 MG/DL (ref 0.5–1.4)
CREAT UR-MCNC: 171 MG/DL (ref 23–375)
ERYTHROCYTE [DISTWIDTH] IN BLOOD BY AUTOMATED COUNT: 13.2 % (ref 11.5–14.5)
FIO2: 21 %
FIO2: 21 %
GFR SERPLBLD CREATININE-BSD FMLA CKD-EPI: 58 ML/MIN/1.73/M2
GLUCOSE SERPL-MCNC: 108 MG/DL (ref 70–110)
GLUCOSE UR QL STRIP: NEGATIVE
HCT VFR BLD AUTO: 39.6 % (ref 40–54)
HCT VFR BLD CALC: 41.6 %
HCV AB SERPL QL IA: NORMAL
HGB BLD-MCNC: 13.1 GM/DL (ref 14–18)
HGB UR QL STRIP: ABNORMAL
HYALINE CASTS UR QL AUTO: 70 /LPF (ref 0–1)
IMM GRANULOCYTES # BLD AUTO: 0.11 K/UL (ref 0–0.04)
IMM GRANULOCYTES NFR BLD AUTO: 0.7 % (ref 0–0.5)
KETONES UR QL STRIP: ABNORMAL
LACTATE SERPL-SCNC: 1 MMOL/L (ref 0.5–2.2)
LACTATE SERPL-SCNC: 5.3 MMOL/L (ref 0.5–2.2)
LDH SERPL L TO P-CCNC: 1.1 MMOL/L (ref 0.5–2.2)
LEUKOCYTE ESTERASE UR QL STRIP: NEGATIVE
LYMPHOCYTES # BLD AUTO: 1.13 K/UL (ref 1–4.8)
MAGNESIUM SERPL-MCNC: 3.7 MG/DL (ref 1.6–2.6)
MCH RBC QN AUTO: 28.8 PG (ref 27–31)
MCHC RBC AUTO-ENTMCNC: 33.1 G/DL (ref 32–36)
MCV RBC AUTO: 87 FL (ref 82–98)
METHADONE UR QL SCN: NEGATIVE
MICROSCOPIC COMMENT: ABNORMAL
NITRITE UR QL STRIP: NEGATIVE
NUCLEATED RBC (/100WBC) (OHS): 0 /100 WBC
OHS QRS DURATION: 86 MS
OHS QTC CALCULATION: 469 MS
OPIATES UR QL SCN: NEGATIVE
PCO2 BLDA: 29.1 MMHG
PCO2 BLDA: 47.5 MMHG
PCP UR QL: NEGATIVE
PH SMN: 7.28 [PH]
PH SMN: 7.41 [PH]
PH UR STRIP: 6 [PH]
PLATELET # BLD AUTO: 239 K/UL (ref 150–450)
PMV BLD AUTO: 9.6 FL (ref 9.2–12.9)
PO2 BLDA: 120 MMHG
PO2 BLDA: 38.5 MMHG
POC BASE DEFICIT: -4.6 MMOL/L
POC BASE DEFICIT: -4.9 MMOL/L
POC HCO3: 19.7 MMOL/L
POC HCO3: 20.6 MMOL/L
POC PERFORMED BY: NORMAL
POC PERFORMED BY: NORMAL
POC SATURATED O2: 97.1 %
POC TEMPERATURE: 37 C
POC TEMPERATURE: 37 C
POTASSIUM SERPL-SCNC: 4.1 MMOL/L (ref 3.5–5.1)
PROCALCITONIN SERPL-MCNC: 3.88 NG/ML
PROT SERPL-MCNC: 8.3 GM/DL (ref 6–8.4)
PROT UR QL STRIP: ABNORMAL
RBC # BLD AUTO: 4.55 M/UL (ref 4.6–6.2)
RBC #/AREA URNS AUTO: 2 /HPF (ref 0–4)
RELATIVE EOSINOPHIL (OHS): 0 %
RELATIVE LYMPHOCYTE (OHS): 7.3 % (ref 18–48)
RELATIVE MONOCYTE (OHS): 7.4 % (ref 4–15)
RELATIVE NEUTROPHIL (OHS): 84.5 % (ref 38–73)
SODIUM SERPL-SCNC: 141 MMOL/L (ref 136–145)
SP GR UR STRIP: 1.02
SPECIMEN SOURCE: NORMAL
SPECIMEN SOURCE: NORMAL
TROPONIN I SERPL HS-MCNC: 80 NG/L
TSH SERPL-ACNC: 0.91 UIU/ML (ref 0.4–4)
UROBILINOGEN UR STRIP-ACNC: NEGATIVE EU/DL
WBC # BLD AUTO: 15.51 K/UL (ref 3.9–12.7)
WBC #/AREA URNS AUTO: 4 /HPF (ref 0–5)

## 2025-04-02 PROCEDURE — 80307 DRUG TEST PRSMV CHEM ANLYZR: CPT | Performed by: EMERGENCY MEDICINE

## 2025-04-02 PROCEDURE — 25000003 PHARM REV CODE 250: Performed by: HOSPITALIST

## 2025-04-02 PROCEDURE — 93010 ELECTROCARDIOGRAM REPORT: CPT | Mod: ,,, | Performed by: INTERNAL MEDICINE

## 2025-04-02 PROCEDURE — 93005 ELECTROCARDIOGRAM TRACING: CPT

## 2025-04-02 PROCEDURE — 82803 BLOOD GASES ANY COMBINATION: CPT

## 2025-04-02 PROCEDURE — 63600175 PHARM REV CODE 636 W HCPCS: Performed by: HOSPITALIST

## 2025-04-02 PROCEDURE — 96367 TX/PROPH/DG ADDL SEQ IV INF: CPT

## 2025-04-02 PROCEDURE — 96366 THER/PROPH/DIAG IV INF ADDON: CPT

## 2025-04-02 PROCEDURE — 84145 PROCALCITONIN (PCT): CPT | Performed by: HOSPITALIST

## 2025-04-02 PROCEDURE — 86803 HEPATITIS C AB TEST: CPT | Performed by: PHYSICIAN ASSISTANT

## 2025-04-02 PROCEDURE — 25000003 PHARM REV CODE 250: Performed by: EMERGENCY MEDICINE

## 2025-04-02 PROCEDURE — G0378 HOSPITAL OBSERVATION PER HR: HCPCS

## 2025-04-02 PROCEDURE — 25500020 PHARM REV CODE 255: Performed by: EMERGENCY MEDICINE

## 2025-04-02 PROCEDURE — 87040 BLOOD CULTURE FOR BACTERIA: CPT | Performed by: EMERGENCY MEDICINE

## 2025-04-02 PROCEDURE — 81001 URINALYSIS AUTO W/SCOPE: CPT | Performed by: EMERGENCY MEDICINE

## 2025-04-02 PROCEDURE — 83735 ASSAY OF MAGNESIUM: CPT | Performed by: EMERGENCY MEDICINE

## 2025-04-02 PROCEDURE — 83605 ASSAY OF LACTIC ACID: CPT

## 2025-04-02 PROCEDURE — 85025 COMPLETE CBC W/AUTO DIFF WBC: CPT | Performed by: EMERGENCY MEDICINE

## 2025-04-02 PROCEDURE — 83605 ASSAY OF LACTIC ACID: CPT | Performed by: EMERGENCY MEDICINE

## 2025-04-02 PROCEDURE — 84484 ASSAY OF TROPONIN QUANT: CPT | Performed by: EMERGENCY MEDICINE

## 2025-04-02 PROCEDURE — 95718 EEG PHYS/QHP 2-12 HR W/VEEG: CPT | Mod: ,,, | Performed by: PSYCHIATRY & NEUROLOGY

## 2025-04-02 PROCEDURE — 96361 HYDRATE IV INFUSION ADD-ON: CPT

## 2025-04-02 PROCEDURE — 80053 COMPREHEN METABOLIC PANEL: CPT | Performed by: EMERGENCY MEDICINE

## 2025-04-02 PROCEDURE — 96375 TX/PRO/DX INJ NEW DRUG ADDON: CPT

## 2025-04-02 PROCEDURE — 96365 THER/PROPH/DIAG IV INF INIT: CPT

## 2025-04-02 PROCEDURE — 83880 ASSAY OF NATRIURETIC PEPTIDE: CPT | Performed by: EMERGENCY MEDICINE

## 2025-04-02 PROCEDURE — 63600175 PHARM REV CODE 636 W HCPCS: Performed by: EMERGENCY MEDICINE

## 2025-04-02 PROCEDURE — 99285 EMERGENCY DEPT VISIT HI MDM: CPT | Mod: 25

## 2025-04-02 PROCEDURE — 83605 ASSAY OF LACTIC ACID: CPT | Mod: 91 | Performed by: HOSPITALIST

## 2025-04-02 PROCEDURE — 99900035 HC TECH TIME PER 15 MIN (STAT)

## 2025-04-02 PROCEDURE — 84443 ASSAY THYROID STIM HORMONE: CPT | Performed by: EMERGENCY MEDICINE

## 2025-04-02 PROCEDURE — 87154 CUL TYP ID BLD PTHGN 6+ TRGT: CPT | Performed by: EMERGENCY MEDICINE

## 2025-04-02 RX ORDER — GLUCAGON 1 MG
1 KIT INJECTION
Status: DISCONTINUED | OUTPATIENT
Start: 2025-04-02 | End: 2025-04-04 | Stop reason: HOSPADM

## 2025-04-02 RX ORDER — LORAZEPAM 2 MG/ML
2 INJECTION INTRAMUSCULAR
Status: DISCONTINUED | OUTPATIENT
Start: 2025-04-02 | End: 2025-04-02

## 2025-04-02 RX ORDER — IBUPROFEN 200 MG
24 TABLET ORAL
Status: DISCONTINUED | OUTPATIENT
Start: 2025-04-02 | End: 2025-04-04 | Stop reason: HOSPADM

## 2025-04-02 RX ORDER — CLONAZEPAM 0.5 MG/1
2 TABLET ORAL DAILY
Status: DISCONTINUED | OUTPATIENT
Start: 2025-04-02 | End: 2025-04-04 | Stop reason: HOSPADM

## 2025-04-02 RX ORDER — NALOXONE HCL 0.4 MG/ML
0.02 VIAL (ML) INJECTION
Status: DISCONTINUED | OUTPATIENT
Start: 2025-04-02 | End: 2025-04-04 | Stop reason: HOSPADM

## 2025-04-02 RX ORDER — SODIUM CHLORIDE 9 MG/ML
INJECTION, SOLUTION INTRAVENOUS CONTINUOUS
Status: DISCONTINUED | OUTPATIENT
Start: 2025-04-02 | End: 2025-04-03

## 2025-04-02 RX ORDER — LEVETIRACETAM 500 MG/5ML
500 INJECTION, SOLUTION, CONCENTRATE INTRAVENOUS EVERY 12 HOURS
Status: DISCONTINUED | OUTPATIENT
Start: 2025-04-02 | End: 2025-04-04 | Stop reason: HOSPADM

## 2025-04-02 RX ORDER — IBUPROFEN 200 MG
16 TABLET ORAL
Status: DISCONTINUED | OUTPATIENT
Start: 2025-04-02 | End: 2025-04-04 | Stop reason: HOSPADM

## 2025-04-02 RX ORDER — VANCOMYCIN 1.75 GRAM/500 ML IN 0.9 % SODIUM CHLORIDE INTRAVENOUS
1750
Status: COMPLETED | OUTPATIENT
Start: 2025-04-02 | End: 2025-04-02

## 2025-04-02 RX ORDER — LORAZEPAM 2 MG/ML
2 INJECTION INTRAMUSCULAR EVERY 4 HOURS PRN
Status: DISCONTINUED | OUTPATIENT
Start: 2025-04-02 | End: 2025-04-04 | Stop reason: HOSPADM

## 2025-04-02 RX ORDER — SODIUM CHLORIDE 0.9 % (FLUSH) 0.9 %
10 SYRINGE (ML) INJECTION EVERY 12 HOURS PRN
Status: DISCONTINUED | OUTPATIENT
Start: 2025-04-02 | End: 2025-04-04 | Stop reason: HOSPADM

## 2025-04-02 RX ADMIN — PIPERACILLIN SODIUM AND TAZOBACTAM SODIUM 4.5 G: 4; .5 INJECTION, POWDER, FOR SOLUTION INTRAVENOUS at 02:04

## 2025-04-02 RX ADMIN — PIPERACILLIN SODIUM AND TAZOBACTAM SODIUM 4.5 G: 4; .5 INJECTION, POWDER, FOR SOLUTION INTRAVENOUS at 11:04

## 2025-04-02 RX ADMIN — SODIUM CHLORIDE 1750 MG: 9 INJECTION, SOLUTION INTRAVENOUS at 03:04

## 2025-04-02 RX ADMIN — SODIUM CHLORIDE 1000 ML: 9 INJECTION, SOLUTION INTRAVENOUS at 12:04

## 2025-04-02 RX ADMIN — CLONAZEPAM 2 MG: 0.5 TABLET ORAL at 06:04

## 2025-04-02 RX ADMIN — SODIUM CHLORIDE: 9 INJECTION, SOLUTION INTRAVENOUS at 06:04

## 2025-04-02 RX ADMIN — SODIUM CHLORIDE 1000 ML: 9 INJECTION, SOLUTION INTRAVENOUS at 02:04

## 2025-04-02 RX ADMIN — LEVETIRACETAM 500 MG: 100 INJECTION INTRAVENOUS at 06:04

## 2025-04-02 RX ADMIN — IOHEXOL 75 ML: 350 INJECTION, SOLUTION INTRAVENOUS at 12:04

## 2025-04-02 NOTE — ED PROVIDER NOTES
Encounter Date: 4/2/2025       History     Chief Complaint   Patient presents with    Seizures     EMS from home, pt called ems stating he had 6 seizures, prescribed diazepam but has been out of his prescription.      This is a 34 year old male patient with a past medical history of HIV, seizure disorder, bipolar disorder and IV methamphetamine abuse who was brought in by EMS following seizure like activity since this morning. Patient states he woke up with a seizure this morning. EMS gave 10 of Versed in the field. Patient states he recalls having about six seizure like episodes since he woke up today. Patient normally takes Keppra and Klonopin, but states he ran out of his Klonopin several days ago. Of note, patient tested positive for Influenza B on 3/30/25. He states he has had a productive cough with associated post-tussive chest pain and dyspnea. He does not feel he is getting better. Patient also reports chills but no fever. He denies abdominal pain, nausea, vomiting, diarrhea or urinary symptoms.     The history is provided by the patient and medical records. The history is limited by the condition of the patient.     Review of patient's allergies indicates:   Allergen Reactions    Clindamycin      Per report from [facility]     Past Medical History:   Diagnosis Date    Bipolar 1 disorder     PHU (generalized anxiety disorder)     Human immunodeficiency virus (HIV) disease     normal CD4 as of 12/22    Panic disorder     Substance use disorder      History reviewed. No pertinent surgical history.  No family history on file.  Social History[1]      Physical Exam     Initial Vitals [04/02/25 1104]   BP Pulse Resp Temp SpO2   (!) 148/69 (!) 138 (!) 26 97.6 °F (36.4 °C) 95 %      MAP       --         Physical Exam    Nursing note and vitals reviewed.  Constitutional: He appears well-developed and well-nourished. He is not diaphoretic. No distress.   HENT:   Head: Normocephalic and atraumatic.   Eyes: EOM are normal.    Neck: Neck supple.   Normal range of motion.  Cardiovascular:  Normal rate and regular rhythm.           Pulmonary/Chest: No respiratory distress.   Abdominal: He exhibits no distension.   Musculoskeletal:         General: Normal range of motion.      Cervical back: Normal range of motion and neck supple.     Neurological: He is alert and oriented to person, place, and time. GCS score is 15. GCS eye subscore is 4. GCS verbal subscore is 5. GCS motor subscore is 6.   Skin: Skin is warm and dry.   Psychiatric: He has a normal mood and affect. His behavior is normal. Judgment and thought content normal.         ED Course   Procedures  Labs Reviewed   COMPREHENSIVE METABOLIC PANEL - Abnormal       Result Value    Sodium 141      Potassium 4.1      Chloride 108      CO2 17 (*)     Glucose 108      BUN 21 (*)     Creatinine 1.6 (*)     Calcium 10.1      Protein Total 8.3      Albumin 4.0      Bilirubin Total 0.3            AST 30      ALT 18      Anion Gap 16      eGFR 58 (*)    MAGNESIUM - Abnormal    Magnesium  3.7 (*)    DRUG SCREEN PANEL, URINE EMERGENCY - Abnormal    Benzodiazepine, Urine Presumptive Positive (*)     Methadone, Urine Negative      Cocaine, Urine Negative      Opiates, Urine Negative      Barbiturates, Urine Negative      Amphetamines, Urine Presumptive Positive (*)     THC Negative      Phencyclidine, Urine Negative      Urine Creatinine 171.0      Narrative:     This screen includes the following classes of drugs at the listed cut-off:     Benzodiazepines:        200 ng/ml   Methadone:              300 ng/ml   Cocaine metabolite:     300 ng/ml   Opiates:                300 ng/ml   Barbiturates:           200 ng/ml   Amphetamines:           1000 ng/ml   Marijuana metabs (THC): 50 ng/ml   Phencyclidine (PCP):    25 ng/ml     This is a screening test. If results do not correlate with clinical presentation, then a confirmatory send out test is advised.    This report is intended for use in  "clinical monitoring and management of patients. It is not intended for use in employment related drug testing."   URINALYSIS, REFLEX TO URINE CULTURE - Abnormal    Color, UA Yellow      Appearance, UA Clear      pH, UA 6.0      Spec Grav UA 1.020      Protein, UA 1+ (*)     Glucose, UA Negative      Ketones, UA Trace (*)     Bilirubin, UA Negative      Blood, UA Trace (*)     Nitrites, UA Negative      Urobilinogen, UA Negative      Leukocyte Esterase, UA Negative     TROPONIN I HIGH SENSITIVITY - Abnormal    Troponin High Sensitive 80 (*)    LACTIC ACID, PLASMA - Abnormal    Lactic Acid Level 5.3 (*)     Narrative:     Falsely low lactic acid results can be found in samples containing >=13.0 mg/dL total bilirubin and/or >=3.5 mg/dL direct bilirubin.    CBC WITH DIFFERENTIAL - Abnormal    WBC 15.51 (*)     RBC 4.55 (*)     HGB 13.1 (*)     HCT 39.6 (*)     MCV 87      MCH 28.8      MCHC 33.1      RDW 13.2      Platelet Count 239      MPV 9.6      Nucleated RBC 0      Neut % 84.5 (*)     Lymph % 7.3 (*)     Mono % 7.4      Eos % 0.0      Basophil % 0.1      Imm Grans % 0.7 (*)     Neut # 13.10 (*)     Lymph # 1.13      Mono # 1.15 (*)     Eos # 0.00      Baso # 0.02      Imm Grans # 0.11 (*)    URINALYSIS MICROSCOPIC - Abnormal    RBC, UA 2      WBC, UA 4      Bacteria, UA Few (*)     Hyaline Casts, UA 70 (*)     Microscopic Comment       PROCALCITONIN - Abnormal    Procalcitonin 3.88 (*)    BASIC METABOLIC PANEL - Abnormal    Sodium 138      Potassium 4.0      Chloride 110      CO2 20 (*)     Glucose 104      BUN 22 (*)     Creatinine 1.6 (*)     Calcium 8.1 (*)     Anion Gap 8      eGFR 58 (*)    CBC WITH DIFFERENTIAL - Abnormal    WBC 10.46      RBC 4.03 (*)     HGB 11.7 (*)     HCT 34.5 (*)     MCV 86      MCH 29.0      MCHC 33.9      RDW 13.2      Platelet Count 200      MPV 9.8      Nucleated RBC 0      Neut % 70.5      Lymph % 17.1 (*)     Mono % 11.8      Eos % 0.1      Basophil % 0.2      Imm Grans % 0.3   "    Neut # 7.38      Lymph # 1.79      Mono # 1.23 (*)     Eos # 0.01      Baso # 0.02      Imm Grans # 0.03     TROPONIN I HIGH SENSITIVITY - Abnormal    Troponin High Sensitive 43 (*)    HEPATITIS C ANTIBODY - Normal    Hep C Ab Interp Non-Reactive     TSH - Normal    TSH 0.906     B-TYPE NATRIURETIC PEPTIDE - Normal    BNP <10     LACTIC ACID, PLASMA - Normal    Lactic Acid Level 1.0      Narrative:     Falsely low lactic acid results can be found in samples containing >=13.0 mg/dL total bilirubin and/or >=3.5 mg/dL direct bilirubin.    VANCOMYCIN, RANDOM - Normal    Vancomycin Random 14.7     RAPID ORGANISM ID BY PCR (FROM BLOOD CULTURE)    Enterococcus faecalis Not Detected      Enterococcus faecium Not Detected      Listeria monocytogenes Not Detected      Staphylococcus spp. Not Detected      Staphylococcus aureus Not Detected      Staphylococcus epidermidis Not Detected      Staphylococcus lugdunensis Not Detected      Streptococcus spp. Not Detected      Streptococcus agalactiae (Group B) Not Detected      Streptococcus pneumoniae Not Detected      Streptococcus pyogenes (Group A) Not Detected      Acinetobacter calcoaceticus/baumannii complex Not Detected      Bacteroides fragilis Not Detected      Enterobacterales Not Detected      Enterobacter cloacae complex Not Detected      Escherichia coli Not Detected      Klebsiella aerogenes Not Detected      Klebsiella oxytoca Not Detected      Klebsiella pneumoniae group Not Detected      Proteus spp. Not Detected      Salmonella spp. Not Detected      Serratia marcescens Not Detected      Haemophilus influenzae Not Detected      Neisseria meningitidis Not Detected      Pseudomonas aeruginosa Not Detected      Stenotrophomonas maltophilia Not Detected      Candida albicans Not Detected      Candida auris Not Detected      Candida glabrata Not Detected      Candida krusei Not Detected      Candida parapsilosis Not Detected      Candida tropicalis Not Detected       Cryptococcus neoformans/gattii Not Detected      CTX-M (ESBL )        IMP (Cabapenemase )        KPC resistance gene (Carbapenemase )        mcr-1        mecA ID        mecA/C and MREJ (MRSA) gene        NDM (Carbapenemase )        OXA-48-like (Carbapenemase )        Sudarshan/B (VRE gene)        VIM (Carbapenemase )       CBC W/ AUTO DIFFERENTIAL    Narrative:     The following orders were created for panel order CBC auto differential.  Procedure                               Abnormality         Status                     ---------                               -----------         ------                     CBC with Differential[[id number]]       Abnormal            Final result                 Please view results for these tests on the individual orders.   CBC W/ AUTO DIFFERENTIAL    Narrative:     The following orders were created for panel order CBC with Automated Differential.  Procedure                               Abnormality         Status                     ---------                               -----------         ------                     CBC with Differential[[id number]]       Abnormal            Final result                 Please view results for these tests on the individual orders.     EKG Readings: (Independently Interpreted)   Initial Reading: No STEMI. Rhythm: Sinus Tachycardia. Heart Rate: 126. Ectopy: No Ectopy. Conduction: Normal. Axis: Normal.     ECG Results              EKG 12-lead (Final result)        Collection Time Result Time QRS Duration OHS QTC Calculation    04/02/25 11:15:48 04/02/25 11:36:00 86 469                     Final result by Interface, Lab In [facility] (04/02/25 11:36:03)                   Narrative:    Test Reason : R00.0,    Vent. Rate : 126 BPM     Atrial Rate : 126 BPM     P-R Int : 136 ms          QRS Dur :  86 ms      QT Int : 324 ms       P-R-T Axes : -12  -1   7 degrees    QTcB Int : 469 ms    Sinus  tachycardia  Otherwise normal ECG  When compared with ECG of 14-Jul-2023 02:09,  Vent. rate has increased by  76 bpm  Questionable change in The axis  Confirmed by Gaurang Diaz ([id number]) on 4/2/2025 11:35:58 AM    Referred By:            Confirmed By: Gaurang Diaz                                  Imaging Results              ED US Echo (Final result)  Result time 04/03/25 20:02:01      Final result by Interface, Lab In [facility] (04/03/25 20:02:01)                   Narrative:    Exam : Mauro Dias  POCUS_Cardiac:    Exam Information:  Exam category:  Diagnostic  Consent obtained?:  Yes    Indication(s) for Exam:  Chest Pain    Views Obtained:  Parasternal long-axis, Parasternal short-axis, Subxiphoid, Apical 4-chamber, IVC view    Findings:  Left Ventricle Ejection Fraction:  Depressed (30-55% EF)  Pericardial Effusion:  Absent  Gross [place] (RV:LV):  Normal  IVC diameter:  <2 cm  IVC Diameter (cm):  1.60  IVC collapsibility:  High collapsibility (>50%)  Aortic root diameter:  Normal    Interpretation:  Sonographic findings suggestive of hypovolemia    Electronically signed by Mauro Dias on Wednesday, April 2, 2025 at 5:01 PM  Electronically signed by Erica Manzo on Thursday, April 3, 2025 at 8:01 PM                                     CTA Chest Non-Coronary (PE Studies) (Final result)  Result time 04/02/25 12:48:49      Final result by Song Strong MD (04/02/25 12:48:49)                   Impression:      1. No pulmonary embolism is identified.      Electronically signed by: Song Mcghee  Date:    04/02/2025  Time:    12:48               Narrative:    EXAMINATION:  CTA CHEST NON CORONARY (PE STUDIES)    CLINICAL HISTORY:  Pulmonary embolism (PE) suspected, high prob;    TECHNIQUE:  CTA of the chest was obtained. Images were reformatted and reviewed in coronal and sagittal planes. Volume-rendered 3D reconstructed images were acquired by post  processing for a better visualization of the vascular anomalies, on an independent [employer] workstation with concurrent supervision and archived for permanent records.  Images were acquired during the arterial phase after 75 cc of non ionic intravenous contrast administration.    Suboptimal assessment of the abdominal solid organs due to lack of a venous phase.    COMPARISON:  None.    FINDINGS:  Diagnostic quality: Adequate.    Pulmonary arteries: The pulmonary arteries are well visualized through the segmental level. No pulmonary embolism is identified.    Right heart strain: None.    Lungs and pleura: Normal.    Mediastinum and samantha: No mediastinal or hilar adenopathy.    Vessels: No atherosclerotic changes in the aorta and coronary arteries.    Heart and pericardium: Heart size is normal. No pericardial effusion.    Upper abdomen: Left hepatic lobe 9 mm cystic appearing lesion.    Bones: Normal.                                       X-Ray Chest AP Portable (Final result)  Result time 04/02/25 12:23:05      Final result by Nikita Steele MD (04/02/25 12:23:05)                   Impression:      Stable normal chest.      Electronically signed by: Nikita Steele MD  Date:    04/02/2025  Time:    12:23               Narrative:    EXAMINATION:  XR CHEST AP PORTABLE    CLINICAL HISTORY:  hypoxia;    TECHNIQUE:  Single frontal view of the chest was performed.    COMPARISON:  12/09/2023    FINDINGS:  Heart normal.  Lungs clear.                                       Medications   sodium chloride 0.9% bolus 1,000 mL 1,000 mL (0 mLs Intravenous Stopped 4/2/25 1327)   iohexoL (OMNIPAQUE 350) injection 75 mL (75 mLs Intravenous Given 4/2/25 1219)   sodium chloride 0.9% bolus 1,000 mL 1,000 mL (0 mLs Intravenous Stopped 4/2/25 1538)   piperacillin-tazobactam (ZOSYN) 4.5 g in D5W 100 mL IVPB (MB+) (0 g Intravenous Stopped 4/2/25 1538)   vancomycin 1750 mg in 0.9% sodium chloride 500 mL IVPB (0 mg Intravenous Stopped  "4/2/25 2020)   vancomycin 1,500 mg in 0.9% NaCl 250 mL IVPB (admixture device) (0 mg Intravenous Stopped 4/3/25 1202)   diphenhydrAMINE injection 25 mg (25 mg Intravenous Given 4/3/25 9439)     Medical Decision Making  DDX: medication noncompliance, benzodiazepine withdrawal, infection    Patient with hypoxia requiring O2 in the ED, elevated lactate (from seizure versus sepsis)  Does have an RAJ  Was given versed with EMS  Treated with additional benzos, iv fluids, supplemental O2, broad spectrum abx   Hypoxia improving in the ED  Patient admitted to     This patient does not have evidence of infective focus  My overall impression is sepsis.  Source: Unknown  Antibiotics given- Antibiotics (72h ago, onward)    None      Latest lactate reviewed-  No results for input(s): "LACTATE", "POCLAC" in the last 72 hours.  Organ dysfunction indicated by Acute kidney injury    Fluid challenge Ideal Body Weight- The patient is obese (BMI>30) and their ideal body weight of Ideal body weight: 79.9 kg (176 lb 2.4 oz) will be used to calculate fluid bolus of 30 ml/kg.     Post- resuscitation assessment Yes - I attest a sepsis perfusion exam was performed within 6 hours of sepsis, severe sepsis, or septic shock presentation, following fluid resuscitation.      Will Not start Pressors- Levophed for MAP of 65  Source control achieved by: iv abx      Amount and/or Complexity of Data Reviewed  Labs: ordered. Decision-making details documented in ED Course.  Radiology: ordered.  ECG/medicine tests: ordered and independent interpretation performed. Decision-making details documented in ED Course.    Risk  Prescription drug management.  Decision regarding hospitalization.              Attending Attestation:         Attending Critical Care:   Critical Care Times:   ==============================================================  Total Critical Care Time - exclusive of procedural time: 45 " minutes.  ==============================================================  Critical care was necessary to treat or prevent imminent or life-threatening deterioration of the following conditions: sepsis.   Critical care was time spent personally by me on the following activities: obtaining history from patient or relative, examination of patient, review of old charts, ordering lab, x-rays, and/or EKG, development of treatment plan with patient or relative, ordering and performing treatments and interventions, evaluation of patient's response to treatment, discussion with consultants and re-evaluation of patient's conition.   Critical Care Condition: potentially life-threatening           ED Course as of 04/06/25 1609   Wed Apr 02, 2025   1250 Creatinine(!): 1.6  RAJ [GK]      ED Course User Index  [GK] Johanna Moore MD                           Clinical Impression:  Final diagnoses:  [R00.0] Tachycardia          ED Disposition Condition    Admit                     [1]   Social History  Tobacco Use    Smoking status: Some Days     Types: Cigarettes    Smokeless tobacco: Never   Substance Use Topics    Alcohol use: Yes     Comment: occasionally     Drug use: Yes     Frequency: 4.0 times per week     Types: Amphetamines        Johanna Moore MD  04/06/25 1609

## 2025-04-02 NOTE — SUBJECTIVE & OBJECTIVE
Past Medical History:   Diagnosis Date    Bipolar 1 disorder     PHU (generalized anxiety disorder)     Human immunodeficiency virus (HIV) disease     normal CD4 as of 12/22    Panic disorder     Substance use disorder        History reviewed. No pertinent surgical history.    Review of patient's allergies indicates:   Allergen Reactions    Clindamycin      Per report from [facility]       No current facility-administered medications on file prior to encounter.     Current Outpatient Medications on File Prior to Encounter   Medication Sig    haloperidoL (HALDOL) 10 MG tablet Take 10 mg by mouth as needed (insomnia).    levETIRAcetam (KEPPRA) 500 MG Tab Take 1 tablet (500 mg total) by mouth 2 (two) times daily.    OLANZapine (ZYPREXA) 10 MG tablet Take 1 tablet (10 mg total) by mouth every evening.    ondansetron (ZOFRAN-ODT) 4 MG TbDL Take 1 tablet (4 mg total) by mouth every 6 (six) hours as needed (nausea).    pregabalin (LYRICA) 150 MG capsule Take 150 mg by mouth 2 (two) times daily.    primidone (MYSOLINE) 50 MG Tab Take 50 mg by mouth once daily.     Family History    None       Tobacco Use    Smoking status: Some Days     Types: Cigarettes    Smokeless tobacco: Never   Substance and Sexual Activity    Alcohol use: Yes     Comment: occasionally     Drug use: Yes     Frequency: 4.0 times per week     Types: Amphetamines    Sexual activity: Not on file     Review of Systems  Objective:     Vital Signs (Most Recent):  Temp: 97.9 °F (36.6 °C) (04/02/25 1131)  Pulse: 77 (04/02/25 1417)  Resp: 16 (04/02/25 1417)  BP: 125/76 (04/02/25 1417)  SpO2: 98 % (04/02/25 1417) Vital Signs (24h Range):  Temp:  [97.6 °F (36.4 °C)-97.9 °F (36.6 °C)] 97.9 °F (36.6 °C)  Pulse:  [] 77  Resp:  [16-38] 16  SpO2:  [90 %-98 %] 98 %  BP: (120-148)/(67-76) 125/76     Weight: 81.6 kg (180 lb)  Body mass index is 23.75 kg/m².     Physical Exam  Vitals reviewed.             General: well nourished, nontoxic appearing, shivering  moderately  Skin: Warm and dry  Eyes: No conjunctivitis, no scleral icterus  ENT: No nasal bleeding, no obvious discharge  Cardiovascular: Regular rate and rhythm. No obvious JVD  Pulmonary/Chest: No accessory muscle use. Clear to auscultation bilaterally, on NC, no cyanosis  Gastrointestinal: abd non-distended   Extremities: no clubbing, cyanosis or edema  Musculoskeletal: appropriate muscle bulk, intact movement of extremities; 5/5 fist  and hip flexion BL  Neurological: no facial droop, no slurred speech  Psychiatric: appropriate mood and affect, insight intact

## 2025-04-02 NOTE — PROGRESS NOTES
[facility]  Hospital Medicine  Progress Note    Patient Name: Flako Pearce  MRN: [id number]  Patient Class: OP- Observation   Admission Date: 4/2/2025  Length of Stay: 0 days  Attending Physician: Gilbert Skaggs MD  Primary Care Provider: No, Primary Doctor        Subjective     Principal Problem:<principal problem not specified>        HPI:  34-year-old white male w/ HIV being admitted for lactic acidosis, borderline hypoxia, and possible seizures.  Patient was recently diagnosed with the flu.  He reports waking up today and having multiple seizures, total duration with all of them put together a couple of hours he says.  Says he was alone at home at the time with no one else present; I asked him how he figured out he was having a seizure, he reported that he just knows when he has 1.  Denies any tongue biting, but does report having had urinated on himself a little bit.  Reports feeling very cold and tremoring.  Reports running out of his chronic Klonopin but reports being compliant with his Keppra.  Reports being prescribed the Keppra were good couple of months now whereas the Klonopin has been prescribed for him for a couple of years by medical provider for anxiety.  Does not see a neurologist in the office.  Patient denies taking any other prescribed medications regularly. Denies any recent illicit drug use for at least a few months now.    Just about 4-5 days ago was seen at an outside hospital ER and diagnosed with influenza.  Currently patient reports having significant cough.    In the ED here had stable vital signs except for some borderline hypoxia noted to about 88% on room air.  CT chest was negative for PE, per my personal review there are no infiltrates suggestive of pneumonia.  EKG showed no acute ischemic findings.  Lactic acid is elevated to 5.3.    Overview/Hospital Course:  No notes on file    Past Medical History:   Diagnosis Date    Bipolar 1 disorder     PHU (generalized  anxiety disorder)     Human immunodeficiency virus (HIV) disease     normal CD4 as of 12/22    Panic disorder     Substance use disorder        History reviewed. No pertinent surgical history.    Review of patient's allergies indicates:   Allergen Reactions    Clindamycin      Per report from [facility]       No current facility-administered medications on file prior to encounter.     Current Outpatient Medications on File Prior to Encounter   Medication Sig    haloperidoL (HALDOL) 10 MG tablet Take 10 mg by mouth as needed (insomnia).    levETIRAcetam (KEPPRA) 500 MG Tab Take 1 tablet (500 mg total) by mouth 2 (two) times daily.    OLANZapine (ZYPREXA) 10 MG tablet Take 1 tablet (10 mg total) by mouth every evening.    ondansetron (ZOFRAN-ODT) 4 MG TbDL Take 1 tablet (4 mg total) by mouth every 6 (six) hours as needed (nausea).    pregabalin (LYRICA) 150 MG capsule Take 150 mg by mouth 2 (two) times daily.    primidone (MYSOLINE) 50 MG Tab Take 50 mg by mouth once daily.     Family History    None       Tobacco Use    Smoking status: Some Days     Types: Cigarettes    Smokeless tobacco: Never   Substance and Sexual Activity    Alcohol use: Yes     Comment: occasionally     Drug use: Yes     Frequency: 4.0 times per week     Types: Amphetamines    Sexual activity: Not on file     Review of Systems  Objective:     Vital Signs (Most Recent):  Temp: 97.9 °F (36.6 °C) (04/02/25 1131)  Pulse: 77 (04/02/25 1417)  Resp: 16 (04/02/25 1417)  BP: 125/76 (04/02/25 1417)  SpO2: 98 % (04/02/25 1417) Vital Signs (24h Range):  Temp:  [97.6 °F (36.4 °C)-97.9 °F (36.6 °C)] 97.9 °F (36.6 °C)  Pulse:  [] 77  Resp:  [16-38] 16  SpO2:  [90 %-98 %] 98 %  BP: (120-148)/(67-76) 125/76     Weight: 81.6 kg (180 lb)  Body mass index is 23.75 kg/m².     Physical Exam  Vitals reviewed.            General: well nourished, nontoxic appearing, shivering moderately  Skin: Warm and dry  Eyes: No conjunctivitis, no scleral icterus  ENT: No nasal  bleeding, no obvious discharge  Cardiovascular: Regular rate and rhythm. No obvious JVD  Pulmonary/Chest: No accessory muscle use. Clear to auscultation bilaterally, on NC, no cyanosis  Gastrointestinal: abd non-distended   Extremities: no clubbing, cyanosis or edema  Musculoskeletal: appropriate muscle bulk, intact movement of extremities; 5/5 fist  and hip flexion BL  Neurological: no facial droop, no slurred speech  Psychiatric: appropriate mood and affect, insight intact               Assessment & Plan  Seizure  No definitive EEG report I can find  Continue home keppra  Continue home klonopin  Unclear if he's having true clinical seizures  EEG ordered    Sepsis  Possible sepsis  No bacterial source identified at this time (neg CTA, neg UA)  Continue zosyn and vanc and can discontinue abx if neg blood cultures in 48 hrs    Lactic acidosis  Possibly 2/2 seizures vs sepsis  IVFs  Tx underlying issues    HIV disease  Home HAART    Anxiety  Home klonopin    Influenza  Droplet, no benefit in Tamiflu at this point  O2 as warranted    Borderline low oxygen saturation level  Treat underlying issues, wean as tolerated      VTE Risk Mitigation (From admission, onward)           Ordered     IP VTE LOW RISK PATIENT  Once         04/02/25 1528     Place sequential compression device  Until discontinued         04/02/25 1528                    Discharge Planning   LORRIE:      Code Status: Full Code   Medical Readiness for Discharge Date:                            Gilbert Skaggs MD  Department of Hospital Medicine   [facility]

## 2025-04-02 NOTE — CARE UPDATE
[facility] Sepsis Work-up Follow Up Note     3/4 SIRS (, RR 26, WBC 15.51)   Lactic acid 5.3, repeated 1.0     Patient's sepsis care was reviewed and a Yes - I attest a sepsis perfusion exam was performed within 6 hours of sepsis, severe sepsis, or septic shock presentation, following fluid resuscitation. on 04/02/2025 at 5:14 PM.    Patient has received appropriate sepsis care and a fluid challenge of 2 L IVFs bolus. Tachycardia improved. Lactic acidosis improved.       Areli Benson, PAAmaC  [facility]

## 2025-04-02 NOTE — H&P
[facility]  [facility] Medicine  History & Physical    Patient Name: Flako Pearce  MRN: [id number]  Patient Class: OP- Observation  Admission Date: 4/2/2025  Attending Physician: Gilbret Skaggs MD   Primary Care Provider: [facility] Primary Doctor         Patient information was obtained from patient, past medical records, and ER records.     Subjective:     Principal Problem:<principal problem not specified>    Chief Complaint:   Chief Complaint   Patient presents with    Seizures     EMS from home, pt called ems stating he had 6 seizures, prescribed diazepam but has been out of his prescription.         HPI: 34-year-old white male w/ HIV being admitted for lactic acidosis, borderline hypoxia, and possible seizures.  Patient was recently diagnosed with the flu.  He reports waking up today and having multiple seizures, total duration with all of them put together a couple of hours he says.  Says he was alone at home at the time with no one else present; I asked him how he figured out he was having a seizure, he reported that he just knows when he has 1.  Denies any tongue biting, but does report having had urinated on himself a little bit.  Reports feeling very cold and tremoring.  Reports running out of his chronic Klonopin but reports being compliant with his Keppra.  Reports being prescribed the Keppra were good couple of months now whereas the Klonopin has been prescribed for him for a couple of years by medical provider for anxiety.  Does not see a neurologist in the office.  Patient denies taking any other prescribed medications regularly. Denies any recent illicit drug use for at least a few months now.    Just about 4-5 days ago was seen at an outside hospital ER and diagnosed with influenza.  Currently patient reports having significant cough.    In the ED here had stable vital signs except for some borderline hypoxia noted to about 88% on room air.  CT chest was negative for PE, per my  personal review there are no infiltrates suggestive of pneumonia.  EKG showed no acute ischemic findings.  Lactic acid is elevated to 5.3.    Past Medical History:   Diagnosis Date    Bipolar 1 disorder     PHU (generalized anxiety disorder)     Human immunodeficiency virus (HIV) disease     normal CD4 as of 12/22    Panic disorder     Substance use disorder        History reviewed. No pertinent surgical history.    Review of patient's allergies indicates:   Allergen Reactions    Clindamycin      Per report from [facility]       No current facility-administered medications on file prior to encounter.     Current Outpatient Medications on File Prior to Encounter   Medication Sig    haloperidoL (HALDOL) 10 MG tablet Take 10 mg by mouth as needed (insomnia).    levETIRAcetam (KEPPRA) 500 MG Tab Take 1 tablet (500 mg total) by mouth 2 (two) times daily.    OLANZapine (ZYPREXA) 10 MG tablet Take 1 tablet (10 mg total) by mouth every evening.    ondansetron (ZOFRAN-ODT) 4 MG TbDL Take 1 tablet (4 mg total) by mouth every 6 (six) hours as needed (nausea).    pregabalin (LYRICA) 150 MG capsule Take 150 mg by mouth 2 (two) times daily.    primidone (MYSOLINE) 50 MG Tab Take 50 mg by mouth once daily.     Family History    None       Tobacco Use    Smoking status: Some Days     Types: Cigarettes    Smokeless tobacco: Never   Substance and Sexual Activity    Alcohol use: Yes     Comment: occasionally     Drug use: Yes     Frequency: 4.0 times per week     Types: Amphetamines    Sexual activity: Not on file     Review of Systems  Objective:     Vital Signs (Most Recent):  Temp: 97.9 °F (36.6 °C) (04/02/25 1131)  Pulse: 77 (04/02/25 1417)  Resp: 16 (04/02/25 1417)  BP: 125/76 (04/02/25 1417)  SpO2: 98 % (04/02/25 1417) Vital Signs (24h Range):  Temp:  [97.6 °F (36.4 °C)-97.9 °F (36.6 °C)] 97.9 °F (36.6 °C)  Pulse:  [] 77  Resp:  [16-38] 16  SpO2:  [90 %-98 %] 98 %  BP: (120-148)/(67-76) 125/76     Weight: 81.6 kg (180  lb)  Body mass index is 23.75 kg/m².     Physical Exam  Vitals reviewed.             General: well nourished, nontoxic appearing, shivering moderately  Skin: Warm and dry  Eyes: No conjunctivitis, no scleral icterus  ENT: No nasal bleeding, no obvious discharge  Cardiovascular: Regular rate and rhythm. No obvious JVD  Pulmonary/Chest: No accessory muscle use. Clear to auscultation bilaterally, on NC, no cyanosis  Gastrointestinal: abd non-distended   Extremities: no clubbing, cyanosis or edema  Musculoskeletal: appropriate muscle bulk, intact movement of extremities; 5/5 fist  and hip flexion BL  Neurological: no facial droop, no slurred speech  Psychiatric: appropriate mood and affect, insight intact       Assessment/Plan:     Assessment & Plan  Seizure  No definitive EEG report I can find  Continue home keppra  Continue home klonopin  Unclear if he's having true clinical seizures  EEG ordered - will seek to hold off on further meds and get EEG going first and then contact epilepsy for any seizure like activity prior to acute benzo dosing    Sepsis  Possible sepsis  No bacterial source identified at this time (neg CTA, neg UA)  Continue zosyn and vanc and can discontinue abx if neg blood cultures in 48 hrs    Lactic acidosis  Possibly 2/2 seizures vs sepsis  IVFs  Tx underlying issues    HIV disease  Home HAART    Anxiety  Home klonopin    Influenza  Droplet, no benefit in Tamiflu at this point  O2 as warranted    Borderline low oxygen saturation level  Treat underlying issues, wean as tolerated      VTE Risk Mitigation (From admission, onward)           Ordered     IP VTE LOW RISK PATIENT  Once         04/02/25 1528     Place sequential compression device  Until discontinued         04/02/25 1528                       On 04/02/2025, patient should be placed in hospital observation services under my care.        [facility]  Hospital Medicine  Progress Note    Patient Name: Flako Pearce  MRN:  [id number]  Patient Class: OP- Observation   Admission Date: 4/2/2025  Length of Stay: 0 days  Attending Physician: Gilbert Skaggs MD  Primary Care Provider: No, Primary Doctor        Subjective    Principal Problem:<principal problem not specified>        HPI:  34-year-old white male w/ HIV being admitted for lactic acidosis, borderline hypoxia, and possible seizures.  Patient was recently diagnosed with the flu.  He reports waking up today and having multiple seizures, total duration with all of them put together a couple of hours he says.  Says he was alone at home at the time with no one else present; I asked him how he figured out he was having a seizure, he reported that he just knows when he has 1.  Denies any tongue biting, but does report having had urinated on himself a little bit.  Reports feeling very cold and tremoring.  Reports running out of his chronic Klonopin but reports being compliant with his Keppra.  Reports being prescribed the Keppra were good couple of months now whereas the Klonopin has been prescribed for him for a couple of years by medical provider for anxiety.  Does not see a neurologist in the office.  Patient denies taking any other prescribed medications regularly. Denies any recent illicit drug use for at least a few months now.    Just about 4-5 days ago was seen at an outside hospital ER and diagnosed with influenza.  Currently patient reports having significant cough.    In the ED here had stable vital signs except for some borderline hypoxia noted to about 88% on room air.  CT chest was negative for PE, per my personal review there are no infiltrates suggestive of pneumonia.  EKG showed no acute ischemic findings.  Lactic acid is elevated to 5.3.    Overview/Hospital Course:  No notes on file    Past Medical History:   Diagnosis Date    Bipolar 1 disorder     PHU (generalized anxiety disorder)     Human immunodeficiency virus (HIV) disease     normal CD4 as of 12/22    Panic  disorder     Substance use disorder        History reviewed. No pertinent surgical history.    Review of patient's allergies indicates:   Allergen Reactions    Clindamycin      Per [place] from [place]       No current facility-administered medications on file prior to encounter.     Current Outpatient Medications on File Prior to Encounter   Medication Sig    haloperidoL (HALDOL) 10 MG tablet Take 10 mg by mouth as needed (insomnia).    levETIRAcetam (KEPPRA) 500 MG Tab Take 1 tablet (500 mg total) by mouth 2 (two) times daily.    OLANZapine (ZYPREXA) 10 MG tablet Take 1 tablet (10 mg total) by mouth every evening.    ondansetron (ZOFRAN-ODT) 4 MG TbDL Take 1 tablet (4 mg total) by mouth every 6 (six) hours as needed (nausea).    pregabalin (LYRICA) 150 MG capsule Take 150 mg by mouth 2 (two) times daily.    primidone (MYSOLINE) 50 MG Tab Take 50 mg by mouth once daily.     Family History    None       Tobacco Use    Smoking status: Some Days     Types: Cigarettes    Smokeless tobacco: Never   Substance and Sexual Activity    Alcohol use: Yes     Comment: occasionally     Drug use: Yes     Frequency: 4.0 times per week     Types: Amphetamines    Sexual activity: Not on file     Review of Systems  Objective:     Vital Signs (Most Recent):  Temp: 97.9 °F (36.6 °C) (04/02/25 1131)  Pulse: 77 (04/02/25 1417)  Resp: 16 (04/02/25 1417)  BP: 125/76 (04/02/25 1417)  SpO2: 98 % (04/02/25 1417) Vital Signs (24h Range):  Temp:  [97.6 °F (36.4 °C)-97.9 °F (36.6 °C)] 97.9 °F (36.6 °C)  Pulse:  [] 77  Resp:  [16-38] 16  SpO2:  [90 %-98 %] 98 %  BP: (120-148)/(67-76) 125/76     Weight: 81.6 kg (180 lb)  Body mass index is 23.75 kg/m².     Physical Exam  Vitals reviewed.            General: well nourished, nontoxic appearing, shivering moderately  Skin: Warm and dry  Eyes: No conjunctivitis, no scleral icterus  ENT: No nasal bleeding, no obvious discharge  Cardiovascular: Regular rate and rhythm. No obvious  JVD  Pulmonary/Chest: No accessory muscle use. Clear to auscultation bilaterally, on NC, no cyanosis  Gastrointestinal: abd non-distended   Extremities: no clubbing, cyanosis or edema  Musculoskeletal: appropriate muscle bulk, intact movement of extremities; 5/5 fist  and hip flexion BL  Neurological: no facial droop, no slurred speech  Psychiatric: appropriate mood and affect, insight intact               Assessment & Plan  Seizure  No definitive EEG report I can find  Continue home keppra  Continue home klonopin  Unclear if he's having true clinical seizures  EEG ordered    Sepsis  Possible sepsis  No bacterial source identified at this time (neg CTA, neg UA)  Continue zosyn and vanc and can discontinue abx if neg blood cultures in 48 hrs    Lactic acidosis  Possibly 2/2 seizures vs sepsis  IVFs  Tx underlying issues    HIV disease  Home HAART    Anxiety  Home klonopin    Influenza  Droplet, no benefit in Tamiflu at this point  O2 as warranted    Borderline low oxygen saturation level  Treat underlying issues, wean as tolerated      VTE Risk Mitigation (From admission, onward)           Ordered     IP VTE LOW RISK PATIENT  Once         04/02/25 1528     Place sequential compression device  Until discontinued         04/02/25 1528                    Discharge Planning   LORRIE:      Code Status: Full Code   Medical Readiness for Discharge Date:                            Gilbert Skaggs MD  Department of Hospital Medicine   [facility]      Gilbert Skaggs MD  Department of Hospital Medicine  [facility]

## 2025-04-02 NOTE — ASSESSMENT & PLAN NOTE
No definitive EEG report I can find  Continue home keppra  Continue home klonopin  Unclear if he's having true clinical seizures  EEG ordered - will seek to hold off on further meds and get EEG going first and then contact epilepsy for any seizure like activity prior to acute benzo dosing

## 2025-04-02 NOTE — PROGRESS NOTES
"Pharmacokinetic Initial Assessment: IV Vancomycin    Assessment/Plan:    Initiate intravenous vancomycin with loading dose of 1750 mg once, done in ED. Since SCr increased from baseline, check random vancomycin level with AM labs to determine if scheduling subsequent doses appropriate or if intermittent dosing indicated.  Desired empiric serum trough concentration is 15 to 20 mcg/mL.  Draw vancomycin random level with AM labs on 04/03/2025.  Pharmacy will continue to follow and monitor vancomycin.      Please contact pharmacy at extension [phone] with any questions regarding this assessment.     Thank you for the consult,   Sylvester Boyd       Patient brief summary:  Flako Pearce is a 34 y.o. male initiated on antimicrobial therapy with IV Vancomycin for treatment of suspected sepsis.    Drug Allergies:   Review of patient's allergies indicates:   Allergen Reactions    Clindamycin      Per report from [facility]       Actual Body Weight:   81.6 kg    Renal Function:   Estimated Creatinine Clearance: 74.2 mL/min (A) (based on SCr of 1.6 mg/dL (H)).    CBC (last 72 hours):  Recent Labs   Lab Result Units 04/02/25  1310   WBC K/uL 15.51*   HGB gm/dL 13.1*   HCT % 39.6*   Platelet Count K/uL 239   Lymph % % 7.3*   Mono % % 7.4   Eos % % 0.0   Basophil % % 0.1       Metabolic Panel (last 72 hours):  Recent Labs   Lab Result Units 04/02/25  1157 04/02/25  1301   Sodium mmol/L 141  --    Potassium mmol/L 4.1  --    Chloride mmol/L 108  --    CO2 mmol/L 17*  --    Glucose mg/dL 108  --    Glucose, UA   --  Negative   BUN mg/dL 21*  --    Creatinine mg/dL 1.6*  --    Urine Creatinine mg/dL  --  171.0   Albumin g/dL 4.0  --    Bilirubin Total mg/dL 0.3  --    ALP unit/L 123  --    AST unit/L 30  --    ALT unit/L 18  --    Magnesium  mg/dL 3.7*  --        Drug levels (last 3 results):  No results for input(s): "VANCOMYCINRA", "VANCORANDOM", "VANCOMYCINPE", "VANCOPEAK", "VANCOMYCINTR", "VANCOTROUGH" in the last 72 " hours.    Microbiologic Results:  Microbiology Results (last 7 days)       Procedure Component Value Units Date/Time    Blood culture #1 **CANNOT BE ORDERED STAT** [[id number]] Collected: 04/02/25 1158    Order Status: Resulted Specimen: Blood from Peripheral, Upper Arm, Left Updated: 04/02/25 1209    Blood culture #2 **CANNOT BE ORDERED STAT** [[id number]] Collected: 04/02/25 1157    Order Status: Resulted Specimen: Blood from Peripheral, Antecubital, Left Updated: 04/02/25 1209

## 2025-04-02 NOTE — ASSESSMENT & PLAN NOTE
No definitive EEG report I can find  Continue home keppra  Continue home klonopin  Unclear if he's having true clinical seizures  EEG ordered

## 2025-04-02 NOTE — ASSESSMENT & PLAN NOTE
Possible sepsis  No bacterial source identified at this time (neg CTA, neg UA)  Continue zosyn and vanc and can discontinue abx if neg blood cultures in 48 hrs

## 2025-04-02 NOTE — HPI
34-year-old white male w/ HIV being admitted for lactic acidosis, borderline hypoxia, and possible seizures.  Patient was recently diagnosed with the flu.  He reports waking up today and having multiple seizures, total duration with all of them put together a couple of hours he says.  Says he was alone at home at the time with no one else present; I asked him how he figured out he was having a seizure, he reported that he just knows when he has 1.  Denies any tongue biting, but does report having had urinated on himself a little bit.  Reports feeling very cold and tremoring.  Reports running out of his chronic Klonopin but reports being compliant with his Keppra.  Reports being prescribed the Keppra were good couple of months now whereas the Klonopin has been prescribed for him for a couple of years by medical provider for anxiety.  Does not see a neurologist in the office.  Patient denies taking any other prescribed medications regularly. Denies any recent illicit drug use for at least a few months now.    Just about 4-5 days ago was seen at an outside hospital ER and diagnosed with influenza.  Currently patient reports having significant cough.    In the ED here had stable vital signs except for some borderline hypoxia noted to about 88% on room air.  CT chest was negative for PE, per my personal review there are no infiltrates suggestive of pneumonia.  EKG showed no acute ischemic findings.  Lactic acid is elevated to 5.3.

## 2025-04-02 NOTE — ED TRIAGE NOTES
Flako Pearce, a 34 y.o. male presents to the ED w/ complaint of seizures. Pt states that he woke up this morning seizing, and after a few hours decided to call the ambulance. Per EMS, the pt had a seizure while on the phone, but was not seizing on arrival. Pt received 10 mg IM versed. Pt has hx of IVDA, HIV. Pt recently ran out of his diazepam and keppra. Pt reports SOB and sharp chest pain.    Triage note:  Chief Complaint   Patient presents with    Seizures     EMS from home, pt called ems stating he had 6 seizures, prescribed diazepam but has been out of his prescription.      Review of patient's allergies indicates:   Allergen Reactions    Clindamycin      Per report from [facility]     Past Medical History:   Diagnosis Date    Bipolar 1 disorder     PHU (generalized anxiety disorder)     Human immunodeficiency virus (HIV) disease     normal CD4 as of 12/22    Panic disorder     Substance use disorder

## 2025-04-02 NOTE — SUBJECTIVE & OBJECTIVE
Past Medical History:   Diagnosis Date    Bipolar 1 disorder     PHU (generalized anxiety disorder)     Human immunodeficiency virus (HIV) disease     normal CD4 as of 12/22    Panic disorder     Substance use disorder        History reviewed. No pertinent surgical history.    Review of patient's allergies indicates:   Allergen Reactions    Clindamycin      Per report from [facility]       No current facility-administered medications on file prior to encounter.     Current Outpatient Medications on File Prior to Encounter   Medication Sig    haloperidoL (HALDOL) 10 MG tablet Take 10 mg by mouth as needed (insomnia).    levETIRAcetam (KEPPRA) 500 MG Tab Take 1 tablet (500 mg total) by mouth 2 (two) times daily.    OLANZapine (ZYPREXA) 10 MG tablet Take 1 tablet (10 mg total) by mouth every evening.    ondansetron (ZOFRAN-ODT) 4 MG TbDL Take 1 tablet (4 mg total) by mouth every 6 (six) hours as needed (nausea).    pregabalin (LYRICA) 150 MG capsule Take 150 mg by mouth 2 (two) times daily.    primidone (MYSOLINE) 50 MG Tab Take 50 mg by mouth once daily.     Family History    None       Tobacco Use    Smoking status: Some Days     Types: Cigarettes    Smokeless tobacco: Never   Substance and Sexual Activity    Alcohol use: Yes     Comment: occasionally     Drug use: Yes     Frequency: 4.0 times per week     Types: Amphetamines    Sexual activity: Not on file     Review of Systems  Objective:     Vital Signs (Most Recent):  Temp: 97.9 °F (36.6 °C) (04/02/25 1131)  Pulse: 77 (04/02/25 1417)  Resp: 16 (04/02/25 1417)  BP: 125/76 (04/02/25 1417)  SpO2: 98 % (04/02/25 1417) Vital Signs (24h Range):  Temp:  [97.6 °F (36.4 °C)-97.9 °F (36.6 °C)] 97.9 °F (36.6 °C)  Pulse:  [] 77  Resp:  [16-38] 16  SpO2:  [90 %-98 %] 98 %  BP: (120-148)/(67-76) 125/76     Weight: 81.6 kg (180 lb)  Body mass index is 23.75 kg/m².     Physical Exam  Vitals reviewed.            General: well nourished, nontoxic appearing, shivering  moderately  Skin: Warm and dry  Eyes: No conjunctivitis, no scleral icterus  ENT: No nasal bleeding, no obvious discharge  Cardiovascular: Regular rate and rhythm. No obvious JVD  Pulmonary/Chest: No accessory muscle use. Clear to auscultation bilaterally, on NC, no cyanosis  Gastrointestinal: abd non-distended   Extremities: no clubbing, cyanosis or edema  Musculoskeletal: appropriate muscle bulk, intact movement of extremities; 5/5 fist  and hip flexion BL  Neurological: no facial droop, no slurred speech  Psychiatric: appropriate mood and affect, insight intact

## 2025-04-03 LAB
ABSOLUTE EOSINOPHIL (OHS): 0.01 K/UL
ABSOLUTE MONOCYTE (OHS): 1.23 K/UL (ref 0.3–1)
ABSOLUTE NEUTROPHIL COUNT (OHS): 7.38 K/UL (ref 1.8–7.7)
ACB COMPLEX DNA BLD POS QL NAA+NON-PROBE: NOT DETECTED
ANION GAP (OHS): 8 MMOL/L (ref 8–16)
B FRAGILIS DNA BLD POS QL NAA+PROBE: NOT DETECTED
BASOPHILS # BLD AUTO: 0.02 K/UL
BASOPHILS NFR BLD AUTO: 0.2 %
BUN SERPL-MCNC: 22 MG/DL (ref 6–20)
C ALBICANS DNA BLD POS QL NAA+PROBE: NOT DETECTED
C AURIS DNA BLD POS QL NAA+NON-PROBE: NOT DETECTED
C GATTII+NEOFOR DNA CSF QL NAA+NON-PROBE: NOT DETECTED
C GLABRATA DNA BLD POS QL NAA+PROBE: NOT DETECTED
C KRUSEI DNA BLD POS QL NAA+PROBE: NOT DETECTED
C PARAP DNA BLD POS QL NAA+PROBE: NOT DETECTED
C TROPICLS DNA BLD POS QL NAA+PROBE: NOT DETECTED
CALCIUM SERPL-MCNC: 8.1 MG/DL (ref 8.7–10.5)
CHLORIDE SERPL-SCNC: 110 MMOL/L (ref 95–110)
CO2 SERPL-SCNC: 20 MMOL/L (ref 23–29)
COLISTIN RES MCR-1 ISLT/SPM QL: NORMAL
CREAT SERPL-MCNC: 1.6 MG/DL (ref 0.5–1.4)
E CLOAC COMP DNA BLD POS QL NAA+PROBE: NOT DETECTED
E COLI DNA BLD POS QL NAA+PROBE: NOT DETECTED
E FAECALIS+OTHR E SP RRNA BLD POS FISH: NOT DETECTED
E FAECIUM HSP60 BLD POS QL PROBE: NOT DETECTED
ENTEROBACTERALES DNA BLD POS NAA+N-PRB: NOT DETECTED
ERYTHROCYTE [DISTWIDTH] IN BLOOD BY AUTOMATED COUNT: 13.2 % (ref 11.5–14.5)
ESBL CFT TO CFT-CLAV IC RTO BD POS IMP: NORMAL
GFR SERPLBLD CREATININE-BSD FMLA CKD-EPI: 58 ML/MIN/1.73/M2
GLUCOSE SERPL-MCNC: 104 MG/DL (ref 70–110)
GP B STREP DNA CSF QL NAA+NON-PROBE: NOT DETECTED
HAEM INFLU DNA CSF QL NAA+NON-PROBE: NOT DETECTED
HCT VFR BLD AUTO: 34.5 % (ref 40–54)
HGB BLD-MCNC: 11.7 GM/DL (ref 14–18)
IMM GRANULOCYTES # BLD AUTO: 0.03 K/UL (ref 0–0.04)
IMM GRANULOCYTES NFR BLD AUTO: 0.3 % (ref 0–0.5)
IMP CARBAPENEMASE ISLT QL IA.RAPID: NORMAL
K OXYTOCA OMPA BLD POS QL PROBE: NOT DETECTED
KLEBSIELLA SP DNA BLD POS QL NAA+NON-PRB: NOT DETECTED
KLEBSIELLA SP DNA BLD POS QL NAA+NON-PRB: NOT DETECTED
KPC CARBAPENEMASE ISLT QL IA.RAPID: NORMAL
L MONOCYTOG DNA CSF QL NAA+NON-PROBE: NOT DETECTED
LYMPHOCYTES # BLD AUTO: 1.79 K/UL (ref 1–4.8)
MCH RBC QN AUTO: 29 PG (ref 27–31)
MCHC RBC AUTO-ENTMCNC: 33.9 G/DL (ref 32–36)
MCV RBC AUTO: 86 FL (ref 82–98)
MECA+MECC NOSE QL NAA+PROBE: NORMAL
MECA+MECC+MREJ ISLT/SPM QL: NORMAL
N MEN DNA CSF QL NAA+NON-PROBE: NOT DETECTED
NDM CARBAPENEMASE ISLT QL IA.RAPID: NORMAL
NUCLEATED RBC (/100WBC) (OHS): 0 /100 WBC
OXA-48-LIKE CRBPNASE ISLT QL IA.RAPID: NORMAL
P AERUGINOSA DNA BLD POS QL NAA+PROBE: NOT DETECTED
PLATELET # BLD AUTO: 200 K/UL (ref 150–450)
PMV BLD AUTO: 9.8 FL (ref 9.2–12.9)
POTASSIUM SERPL-SCNC: 4 MMOL/L (ref 3.5–5.1)
PROTEUS SP DNA BLD POS QL NAA+PROBE: NOT DETECTED
RBC # BLD AUTO: 4.03 M/UL (ref 4.6–6.2)
RELATIVE EOSINOPHIL (OHS): 0.1 %
RELATIVE LYMPHOCYTE (OHS): 17.1 % (ref 18–48)
RELATIVE MONOCYTE (OHS): 11.8 % (ref 4–15)
RELATIVE NEUTROPHIL (OHS): 70.5 % (ref 38–73)
S AUREUS DNA BLD POS QL NAA+PROBE: NOT DETECTED
S ENT+BONG DNA STL QL NAA+NON-PROBE: NOT DETECTED
S EPIDERMIDIS HSP60 BLD POS QL PROBE: NOT DETECTED
S LUGDUNENSIS SODA BLD POS QL PROBE: NOT DETECTED
S MALTOPH DNA BLD POS QL NAA+PROBE: NOT DETECTED
S MARCESCENS DNA BLD POS QL NAA+PROBE: NOT DETECTED
S PNEUM DNA CSF QL NAA+NON-PROBE: NOT DETECTED
S PYOGENES HSP60 BLD POS QL PROBE: NOT DETECTED
SODIUM SERPL-SCNC: 138 MMOL/L (ref 136–145)
STAPH SP TUF BLD POS QL PROBE: NOT DETECTED
STREPTOCOCCUS SP TUF BLD POS QL PROBE: NOT DETECTED
TROPONIN I SERPL HS-MCNC: 43 NG/L
VAN(A+B+C1+C2) GENES ISLT/SPM: NORMAL
VANCOMYCIN SERPL-MCNC: 14.7 UG/ML (ref ?–80)
VIM CARBAPENEMASE ISLT QL IA.RAPID: NORMAL
WBC # BLD AUTO: 10.46 K/UL (ref 3.9–12.7)

## 2025-04-03 PROCEDURE — 63600175 PHARM REV CODE 636 W HCPCS

## 2025-04-03 PROCEDURE — 25000003 PHARM REV CODE 250

## 2025-04-03 PROCEDURE — 63600175 PHARM REV CODE 636 W HCPCS: Performed by: PHYSICIAN ASSISTANT

## 2025-04-03 PROCEDURE — 25000003 PHARM REV CODE 250: Performed by: HOSPITALIST

## 2025-04-03 PROCEDURE — 63600175 PHARM REV CODE 636 W HCPCS: Performed by: HOSPITALIST

## 2025-04-03 PROCEDURE — 80048 BASIC METABOLIC PNL TOTAL CA: CPT | Performed by: HOSPITALIST

## 2025-04-03 PROCEDURE — 87449 NOS EACH ORGANISM AG IA: CPT

## 2025-04-03 PROCEDURE — 96366 THER/PROPH/DIAG IV INF ADDON: CPT

## 2025-04-03 PROCEDURE — 21400001 HC TELEMETRY ROOM

## 2025-04-03 PROCEDURE — 85025 COMPLETE CBC W/AUTO DIFF WBC: CPT | Performed by: HOSPITALIST

## 2025-04-03 PROCEDURE — 84484 ASSAY OF TROPONIN QUANT: CPT

## 2025-04-03 PROCEDURE — 96361 HYDRATE IV INFUSION ADD-ON: CPT

## 2025-04-03 PROCEDURE — 27000207 HC ISOLATION

## 2025-04-03 PROCEDURE — 80202 ASSAY OF VANCOMYCIN: CPT | Performed by: HOSPITALIST

## 2025-04-03 RX ORDER — BENZONATATE 100 MG/1
100 CAPSULE ORAL 3 TIMES DAILY PRN
Status: DISCONTINUED | OUTPATIENT
Start: 2025-04-03 | End: 2025-04-04 | Stop reason: HOSPADM

## 2025-04-03 RX ORDER — TALC
6 POWDER (GRAM) TOPICAL NIGHTLY PRN
Status: DISCONTINUED | OUTPATIENT
Start: 2025-04-04 | End: 2025-04-04 | Stop reason: HOSPADM

## 2025-04-03 RX ORDER — GUAIFENESIN 100 MG/5ML
200 LIQUID ORAL EVERY 4 HOURS PRN
Status: DISCONTINUED | OUTPATIENT
Start: 2025-04-03 | End: 2025-04-04 | Stop reason: HOSPADM

## 2025-04-03 RX ORDER — ELECTROLYTES/DEXTROSE
300 SOLUTION, ORAL ORAL
Status: DISCONTINUED | OUTPATIENT
Start: 2025-04-03 | End: 2025-04-04 | Stop reason: HOSPADM

## 2025-04-03 RX ORDER — DIPHENHYDRAMINE HYDROCHLORIDE 50 MG/ML
25 INJECTION, SOLUTION INTRAMUSCULAR; INTRAVENOUS
Status: COMPLETED | OUTPATIENT
Start: 2025-04-03 | End: 2025-04-03

## 2025-04-03 RX ADMIN — GUAIFENESIN 200 MG: 200 SOLUTION ORAL at 10:04

## 2025-04-03 RX ADMIN — LEVETIRACETAM 500 MG: 100 INJECTION INTRAVENOUS at 07:04

## 2025-04-03 RX ADMIN — PIPERACILLIN SODIUM AND TAZOBACTAM SODIUM 4.5 G: 4; .5 INJECTION, POWDER, FOR SOLUTION INTRAVENOUS at 06:04

## 2025-04-03 RX ADMIN — GUAIFENESIN 200 MG: 200 SOLUTION ORAL at 06:04

## 2025-04-03 RX ADMIN — CLONAZEPAM 2 MG: 0.5 TABLET ORAL at 07:04

## 2025-04-03 RX ADMIN — LEVETIRACETAM 500 MG: 100 INJECTION INTRAVENOUS at 10:04

## 2025-04-03 RX ADMIN — DIPHENHYDRAMINE HYDROCHLORIDE 25 MG: 50 INJECTION, SOLUTION INTRAMUSCULAR; INTRAVENOUS at 09:04

## 2025-04-03 RX ADMIN — PIPERACILLIN SODIUM AND TAZOBACTAM SODIUM 4.5 G: 4; .5 INJECTION, POWDER, FOR SOLUTION INTRAVENOUS at 02:04

## 2025-04-03 RX ADMIN — PIPERACILLIN SODIUM AND TAZOBACTAM SODIUM 4.5 G: 4; .5 INJECTION, POWDER, FOR SOLUTION INTRAVENOUS at 11:04

## 2025-04-03 RX ADMIN — Medication 300 ML: at 10:04

## 2025-04-03 RX ADMIN — VANCOMYCIN HYDROCHLORIDE 1500 MG: 1.5 INJECTION, POWDER, LYOPHILIZED, FOR SOLUTION INTRAVENOUS at 10:04

## 2025-04-03 RX ADMIN — Medication 300 ML: at 05:04

## 2025-04-03 NOTE — PROCEDURES
EEG REPORT      Flako Pearce  [id number]  1990    DATE OF SERVICE: 4/2/2025         METHODOLOGY      Extended electroencephalographic recording is made while the patient is ambulatory and continuing normal daily activities.  Electrodes are placed according to the International 10-20 placement system and included T1 and T2 electrode placement.  Twenty four (24) channels of digital signal (sampling rate of 512/sec) was simultaneously recorded from the scalp including EKG and eye monitors.  Recording band pass was 0.1 to 100 hz and all data was stored digitally on the recorder.  The patient is instructed to press an event button when clinical symptoms occur and write the symptoms into a diary. Activation procedures which include photic stimulation, hyperventilation and instructing patients to perform simple task are done in selected patients.        The EEG is displayed on a monitor screen and can be reformatted into different montages for evaluation.  The entire recoding is submitted for computer assisted analysis to detect spike and electrographic seizure activity.  The entire recording is visually reviewed and the times identified by computer analysis as being spikes or seizures are reviewed again.  Compresses spectral analysis (CSA) is also performed on the activity recorded from each individual channel.  This is displayed as a power display of frequencies from 0 to 30 Hz over time.   The CSA analysis is done and displayed continuously.  This is reviewed for asymmetries in power between homologous areas of the scalp and for presence of changes in power which canbe seen when seizures occur.  Sections of suspected abnormalities on the CSA is then compared with the original EEG recording.  .     Live[employer] software was also utilized in the review of this study.  This software suite analyzes the EEG recording in multiple domains.  Coherence and rhythmicity is computed to identify EEG sections which may  contain organized seizures.  Each channel undergoes analysis to detect presence of spike and sharp waves which have special and morphological characteristic of epileptic activity.  The routine EEG recording is converted from spacial into frequency domain.  This is then displayed comparing homologous areas to identify areas of significant asymmetry.  Algorithm to identify non-cortically generated artifact is used to separate eye movement, EMG and other artifact from the EEG     Recording Times  Start on 4/2/2025  Stop on 4/3/2025    A total of 11:02:35 hours of EEG was recorded.      EEG FINDINGS:  Background activity:   This is a cap study of good technical quality    The background rhythm was characterized by alpha and anterior dominant beta activity with a 10Hz posterior dominant alpha rhythm at 30-70 microvolts.   Symmetry and continuity: the background was continuous and symmetric     Sleep:   No sleep transients were seen.    Activation procedures:   NA    Abnormal activity:   No epileptiform discharges, periodic discharges, lateralized rhythmic delta activity or electrographic seizures were seen.    IMPRESSION:   Normal continuous video EEG      Chuck Almonte MD  Neurology-Epilepsy.  [facility].

## 2025-04-03 NOTE — ASSESSMENT & PLAN NOTE
Possible sepsis  No bacterial source identified at this time (neg CTA, neg UA)  Continue zosyn and vanc and can discontinue abx if neg blood cultures in 48 hrs  1/2 bottles on 04/02 positive for GPR, follow up PCR identification

## 2025-04-03 NOTE — HOSPITAL COURSE
Pt somnolent on evaluation 2/2 sedating medications. 1/2 Bcx on 04/02 positive for gram positive rods. Following rapid PCR/speciation with Bcx contaminant, no growth in other bottle. Symptomatic regimen for influenza B with that infection and running out of klonopin for 48 hours likely precipitating seizure-like activity. Discussed importance of AED/benzo adherence and provided Klonopin paper script to pt for discharge and advised on regimen for influenza care at home. Pt expressed no questions, PCP follow up arranged.

## 2025-04-03 NOTE — ASSESSMENT & PLAN NOTE
No definitive EEG report I can find  Continue home jerryppra  Continue home klonopin  Unclear if he's having true clinical seizures  EEG ordered and tolerated for a few hours before pulling off, but pt eating and more alert throughout day after admission with no further seizure-like activity.

## 2025-04-03 NOTE — ED NOTES
"Upon walking into room, patient was not wearing EEG cap. Patient said he "could not take it anymore," and took it off himself. Provider notified.   "

## 2025-04-03 NOTE — ED NOTES
Telemetry Verification   Patient placed on Telemetry Box  Verified with War Room  Box # 1141   Monitor Tech Pushpa   Rate 73   Rhythm Sinus

## 2025-04-03 NOTE — PROGRESS NOTES
Pharmacokinetic Assessment Follow Up: IV Vancomycin    Vancomycin serum concentration assessment(s):    The random level was drawn correctly and can be used to guide therapy at this time. The measurement is within the desired definitive target range of 10 to 20 mcg/mL.    Vancomycin Regimen Plan:    Random = 14.7 on 4/3 @ 0530; Last dose of 1750 on 4/2 @ 1550  Scr still elevated at 1.6 (reference, Scr ~1 in 2024)    Will re-dose with 1500 mg x1, repeat random in 12 hours     Continue pulse dosing until renal function stabilzes    Drug levels (last 3 results):  Recent Labs   Lab Result Units 04/03/25  0530   Vancomycin Random ug/ml 14.7       Pharmacy will continue to follow and monitor vancomycin.    Please contact pharmacy at extension [phone] for questions regarding this assessment.    Thank you for the consult,   Barb Le       Patient brief summary:  Flako Pearce is a 34 y.o. male initiated on antimicrobial therapy with IV Vancomycin for treatment of sepsis    The patient's current regimen is pulse dosing due to elevated Scr levels.    Drug Allergies:   Review of patient's allergies indicates:   Allergen Reactions    Clindamycin      Per report from [facility]       Actual Body Weight:   81.6 kg    Renal Function:   Estimated Creatinine Clearance: 74.2 mL/min (A) (based on SCr of 1.6 mg/dL (H)).,     Dialysis Method (if applicable):  N/A    CBC (last 72 hours):  Recent Labs   Lab Result Units 04/02/25  1310 04/03/25  0530   WBC K/uL 15.51* 10.46   HGB gm/dL 13.1* 11.7*   HCT % 39.6* 34.5*   Platelet Count K/uL 239 200   Lymph % % 7.3* 17.1*   Mono % % 7.4 11.8   Eos % % 0.0 0.1   Basophil % % 0.1 0.2       Metabolic Panel (last 72 hours):  Recent Labs   Lab Result Units 04/02/25  1157 04/02/25  1301 04/03/25  0530   Sodium mmol/L 141  --  138   Potassium mmol/L 4.1  --  4.0   Chloride mmol/L 108  --  110   CO2 mmol/L 17*  --  20*   Glucose mg/dL 108  --  104   Glucose, UA   --  Negative  --    BUN mg/dL 21*   --  22*   Creatinine mg/dL 1.6*  --  1.6*   Urine Creatinine mg/dL  --  171.0  --    Albumin g/dL 4.0  --   --    Bilirubin Total mg/dL 0.3  --   --    ALP unit/L 123  --   --    AST unit/L 30  --   --    ALT unit/L 18  --   --    Magnesium  mg/dL 3.7*  --   --        Vancomycin Administrations:  vancomycin given in the last 96 hours                     vancomycin 1750 mg in 0.9% sodium chloride 500 mL IVPB (mg) 1,750 mg New Bag 04/02/25 1549                    Microbiologic Results:  Microbiology Results (last 7 days)       Procedure Component Value Units Date/Time    Rapid Organism ID by PCR (from Blood culture) [[id number]] Collected: 04/02/25 1157    Order Status: Resulted Specimen: Blood from Peripheral, Antecubital, Left Updated: 04/02/25 2308    Blood culture #2 **CANNOT BE ORDERED STAT** [[id number]]  (Abnormal) Collected: 04/02/25 1157    Order Status: Completed Specimen: Blood from Peripheral, Antecubital, Left Updated: 04/02/25 2250     Blood Culture Positive - Aerobic/Pediatric Bottle     GRAM STAIN Gram Positive Rods     Comment: Aerobic Bottle Positive   This is an appended report. These results have been appended to a previously preliminary verified report.       Blood culture #1 **CANNOT BE ORDERED STAT** [[id number]]  (Normal) Collected: 04/02/25 1158    Order Status: Completed Specimen: Blood from Peripheral, Upper Arm, Left Updated: 04/02/25 1901     Blood Culture No Growth After 6 Hours

## 2025-04-03 NOTE — CARE UPDATE
Unit [facility] Care Support Interaction      I have reviewed the chart of Flako Pearce who is hospitalized for Seizure. The patient is currently located in the following unit: ED     I have seen and examined the patient and provided the following support:     Clinical support - made changes to the plan in collaboration with the primary team as follows: premedicate next Vanc dose with IV benadryl given splotchy/erythema area to left of neck and upper chest wall. No urticaria. No pruritus. Discussed with staff. He is drowsy as expected with prior medications given.     Proactive rounds - patient is stable       Ky Palmer PA-C  Unit Based [facility]

## 2025-04-03 NOTE — PROGRESS NOTES
[facility] - Emergency Dept  Hospital Medicine  Progress Note    Patient Name: Flako Pearce  MRN: [id number]  Patient Class: IP- Inpatient   Admission Date: 4/2/2025  Length of Stay: 0 days  Attending Physician: Romel Anthony MD  Primary Care Provider: Inocencia, Primary Doctor        Subjective     Principal Problem:Seizure        HPI:  34-year-old white male w/ HIV being admitted for lactic acidosis, borderline hypoxia, and possible seizures.  Patient was recently diagnosed with the flu.  He reports waking up today and having multiple seizures, total duration with all of them put together a couple of hours he says.  Says he was alone at home at the time with no one else present; I asked him how he figured out he was having a seizure, he reported that he just knows when he has 1.  Denies any tongue biting, but does report having had urinated on himself a little bit.  Reports feeling very cold and tremoring.  Reports running out of his chronic Klonopin but reports being compliant with his Keppra.  Reports being prescribed the Keppra were good couple of months now whereas the Klonopin has been prescribed for him for a couple of years by medical provider for anxiety.  Does not see a neurologist in the office.  Patient denies taking any other prescribed medications regularly. Denies any recent illicit drug use for at least a few months now.    Just about 4-5 days ago was seen at an outside hospital ER and diagnosed with influenza.  Currently patient reports having significant cough.    In the ED here had stable vital signs except for some borderline hypoxia noted to about 88% on room air.  CT chest was negative for PE, per my personal review there are no infiltrates suggestive of pneumonia.  EKG showed no acute ischemic findings.  Lactic acid is elevated to 5.3.    Overview/Hospital Course:  Pt somnolent on evaluation 2/2 sedating medications. 1/2 Bcx on 04/02 positive for gram positive rods. Following rapid  PCR/speciation.     Interval History: Somnolent this AM, pt agitated and removed EEG cap. Difficult to decipher encephalopathy vs. Seizure vs. Behavioral issue. Attempting to replace EEG cap to rule out status epilepticus given somnolence. Small benadryl dose provided to rule out rash associated with Abx.     Review of Systems  Objective:     Vital Signs (Most Recent):  Temp: 98.9 °F (37.2 °C) (04/03/25 0416)  Pulse: 65 (04/03/25 1200)  Resp: (!) 21 (04/03/25 1200)  BP: (!) 119/51 (04/03/25 1200)  SpO2: 95 % (04/03/25 1200) Vital Signs (24h Range):  Temp:  [97.8 °F (36.6 °C)-98.9 °F (37.2 °C)] 98.9 °F (37.2 °C)  Pulse:  [63-82] 65  Resp:  [15-24] 21  SpO2:  [95 %-100 %] 95 %  BP: ()/(46-76) 119/51     Weight: 81.6 kg (180 lb)  Body mass index is 23.75 kg/m².    Intake/Output Summary (Last 24 hours) at 4/3/2025 1240  Last data filed at 4/3/2025 1202  Gross per 24 hour   Intake 1442.92 ml   Output 1600 ml   Net -157.08 ml         Physical Exam  Vitals and nursing note reviewed.   HENT:      Head: Normocephalic and atraumatic.   Eyes:      Extraocular Movements: Extraocular movements intact.   Cardiovascular:      Rate and Rhythm: Normal rate and regular rhythm.      Pulses: Normal pulses.   Pulmonary:      Effort: Pulmonary effort is normal. No respiratory distress.      Breath sounds: No wheezing or rales.   Abdominal:      Palpations: Abdomen is soft.      Tenderness: There is no abdominal tenderness. There is no guarding.   Musculoskeletal:      Right lower leg: No edema.      Left lower leg: No edema.   Skin:     General: Skin is warm and dry.      Findings: Erythema (L neck erythema) present.   Neurological:      General: No focal deficit present.      Mental Status: He is oriented to person, place, and time. He is lethargic.   Psychiatric:         Mood and Affect: Mood normal.               Significant Labs: All pertinent labs within the past 24 hours have been reviewed.    Significant Imaging: I have  reviewed all pertinent imaging results/findings within the past 24 hours.      Assessment & Plan  Seizure  No definitive EEG report I can find  Continue home keppra  Continue home klonopin  Unclear if he's having true clinical seizures  EEG ordered and tolerated for a few hours before pulling off, but pt eating and more alert throughout day after admission with no further seizure-like activity.     Sepsis  Possible sepsis  No bacterial source identified at this time (neg CTA, neg UA)  Continue zosyn and vanc and can discontinue abx if neg blood cultures in 48 hrs  1/2 bottles on 04/02 positive for GPR, follow up PCR identification    Lactic acidosis  Resolved    HIV disease  Home HAART    Anxiety  Home klonopin ordered, pt too lethargic to confirm last date of use. May have run out of meds.     Influenza  Droplet, no benefit in Tamiflu at this point  O2 as warranted    Borderline low oxygen saturation level  Treat underlying issues, wean as tolerated    VTE Risk Mitigation (From admission, onward)           Ordered     IP VTE LOW RISK PATIENT  Once         04/02/25 1528     Place sequential compression device  Until discontinued         04/02/25 1528                    Discharge Planning   LORRIE:      Code Status: Full Code   Medical Readiness for Discharge Date:                            Romel Anthony MD  Department of Hospital Medicine   [facility] - Emergency Dept

## 2025-04-03 NOTE — SUBJECTIVE & OBJECTIVE
Interval History: Somnolent this AM, pt agitated and removed EEG cap. Difficult to decipher encephalopathy vs. Seizure vs. Behavioral issue. Attempting to replace EEG cap to rule out status epilepticus given somnolence. Small benadryl dose provided to rule out rash associated with Abx.     Review of Systems  Objective:     Vital Signs (Most Recent):  Temp: 98.9 °F (37.2 °C) (04/03/25 0416)  Pulse: 65 (04/03/25 1200)  Resp: (!) 21 (04/03/25 1200)  BP: (!) 119/51 (04/03/25 1200)  SpO2: 95 % (04/03/25 1200) Vital Signs (24h Range):  Temp:  [97.8 °F (36.6 °C)-98.9 °F (37.2 °C)] 98.9 °F (37.2 °C)  Pulse:  [63-82] 65  Resp:  [15-24] 21  SpO2:  [95 %-100 %] 95 %  BP: ()/(46-76) 119/51     Weight: 81.6 kg (180 lb)  Body mass index is 23.75 kg/m².    Intake/Output Summary (Last 24 hours) at 4/3/2025 1240  Last data filed at 4/3/2025 1202  Gross per 24 hour   Intake 1442.92 ml   Output 1600 ml   Net -157.08 ml         Physical Exam  Vitals and nursing note reviewed.   HENT:      Head: Normocephalic and atraumatic.   Eyes:      Extraocular Movements: Extraocular movements intact.   Cardiovascular:      Rate and Rhythm: Normal rate and regular rhythm.      Pulses: Normal pulses.   Pulmonary:      Effort: Pulmonary effort is normal. No respiratory distress.      Breath sounds: No wheezing or rales.   Abdominal:      Palpations: Abdomen is soft.      Tenderness: There is no abdominal tenderness. There is no guarding.   Musculoskeletal:      Right lower leg: No edema.      Left lower leg: No edema.   Skin:     General: Skin is warm and dry.      Findings: Erythema (L neck erythema) present.   Neurological:      General: No focal deficit present.      Mental Status: He is oriented to person, place, and time. He is lethargic.   Psychiatric:         Mood and Affect: Mood normal.               Significant Labs: All pertinent labs within the past 24 hours have been reviewed.    Significant Imaging: I have reviewed all pertinent  imaging results/findings within the past 24 hours.

## 2025-04-03 NOTE — ED NOTES
Assumed care of the patient. Report received from Emeli. Pt on continuous cardiac monitoring, continuous pulse oximetry, and automatic BP cuff cycling Q15min. Pt in hospital gown, side rails up X2, bed low and locked, and call light is placed within reach. no family/visitors at bedside at this time. Pt denies any complaints or needs.

## 2025-04-04 VITALS
DIASTOLIC BLOOD PRESSURE: 74 MMHG | HEIGHT: 73 IN | SYSTOLIC BLOOD PRESSURE: 125 MMHG | TEMPERATURE: 98 F | BODY MASS INDEX: 25.48 KG/M2 | WEIGHT: 192.25 LBS | HEART RATE: 59 BPM | RESPIRATION RATE: 18 BRPM | OXYGEN SATURATION: 100 %

## 2025-04-04 LAB
BACTERIA BLD CULT: ABNORMAL
BACTERIA BLD CULT: ABNORMAL
C DIFF GDH STL QL: NEGATIVE
C DIFF TOX A+B STL QL IA: NEGATIVE
GRAM STN SPEC: ABNORMAL

## 2025-04-04 PROCEDURE — 63600175 PHARM REV CODE 636 W HCPCS: Performed by: HOSPITALIST

## 2025-04-04 PROCEDURE — 25000003 PHARM REV CODE 250: Performed by: STUDENT IN AN ORGANIZED HEALTH CARE EDUCATION/TRAINING PROGRAM

## 2025-04-04 PROCEDURE — 25000003 PHARM REV CODE 250: Performed by: HOSPITALIST

## 2025-04-04 PROCEDURE — 25000003 PHARM REV CODE 250

## 2025-04-04 RX ORDER — LOPERAMIDE HYDROCHLORIDE 2 MG/1
2 CAPSULE ORAL 4 TIMES DAILY PRN
Qty: 40 CAPSULE | Refills: 0 | Status: CANCELLED | OUTPATIENT
Start: 2025-04-04 | End: 2025-04-14

## 2025-04-04 RX ORDER — LOPERAMIDE HYDROCHLORIDE 2 MG/1
2 CAPSULE ORAL 4 TIMES DAILY PRN
Status: DISCONTINUED | OUTPATIENT
Start: 2025-04-04 | End: 2025-04-04 | Stop reason: HOSPADM

## 2025-04-04 RX ORDER — CLONAZEPAM 2 MG/1
2 TABLET ORAL DAILY
Qty: 30 TABLET | Refills: 3 | Status: SHIPPED | OUTPATIENT
Start: 2025-04-05 | End: 2026-04-05

## 2025-04-04 RX ORDER — GUAIFENESIN 100 MG/5ML
200 LIQUID ORAL EVERY 4 HOURS PRN
Qty: 180 ML | Refills: 0 | Status: SHIPPED | OUTPATIENT
Start: 2025-04-04 | End: 2025-04-14

## 2025-04-04 RX ADMIN — Medication 300 ML: at 02:04

## 2025-04-04 RX ADMIN — LEVETIRACETAM 500 MG: 100 INJECTION INTRAVENOUS at 08:04

## 2025-04-04 RX ADMIN — PIPERACILLIN SODIUM AND TAZOBACTAM SODIUM 4.5 G: 4; .5 INJECTION, POWDER, FOR SOLUTION INTRAVENOUS at 06:04

## 2025-04-04 RX ADMIN — Medication 6 MG: at 12:04

## 2025-04-04 RX ADMIN — GUAIFENESIN 200 MG: 200 SOLUTION ORAL at 08:04

## 2025-04-04 RX ADMIN — LOPERAMIDE HYDROCHLORIDE 2 MG: 2 CAPSULE ORAL at 08:04

## 2025-04-04 RX ADMIN — Medication 300 ML: at 06:04

## 2025-04-04 RX ADMIN — CLONAZEPAM 2 MG: 0.5 TABLET ORAL at 08:04

## 2025-04-04 NOTE — PLAN OF CARE
Problem: Adult Inpatient Plan of Care  Goal: Plan of Care Review  Outcome: Progressing  Goal: Patient-Specific Goal (Individualized)  Outcome: Progressing  Goal: Absence of Hospital-Acquired Illness or Injury  Outcome: Progressing  Goal: Optimal Comfort and Wellbeing  Outcome: Progressing  Goal: Readiness for Transition of Care  Outcome: Progressing     Problem: Sepsis/Septic Shock  Goal: Optimal Coping  Outcome: Progressing  Goal: Absence of Bleeding  Outcome: Progressing  Goal: Blood Glucose Level Within Targeted Range  Outcome: Progressing  Goal: Absence of Infection Signs and Symptoms  Outcome: Progressing  Goal: Optimal Nutrition Intake  Outcome: Progressing     Problem: Infection  Goal: Absence of Infection Signs and Symptoms  Outcome: Progressing

## 2025-04-04 NOTE — DISCHARGE SUMMARY
[facility] - Internal Medicine [facility] Medicine  Discharge Summary      Patient Name: Flako Pearce  MRN: [id number]  KARLA: [id number]  Patient Class: IP- Inpatient  Admission Date: 4/2/2025  Hospital Length of Stay: 1 days  Discharge Date and Time: 04/04/2025 1:17 PM  Attending Physician: Inocencia att. providers found   Discharging Provider: Romel Anthony MD  Primary Care Provider: Inocencia, Primary Doctor  [facility] Medicine Team: [facility] MED I Romel Anthony MD  Primary Care Team: [facility]OS[facility] MED I    HPI:   34-year-old white male w/ HIV being admitted for lactic acidosis, borderline hypoxia, and possible seizures.  Patient was recently diagnosed with the flu.  He reports waking up today and having multiple seizures, total duration with all of them put together a couple of hours he says.  Says he was alone at home at the time with no one else present; I asked him how he figured out he was having a seizure, he reported that he just knows when he has 1.  Denies any tongue biting, but does report having had urinated on himself a little bit.  Reports feeling very cold and tremoring.  Reports running out of his chronic Klonopin but reports being compliant with his Keppra.  Reports being prescribed the Keppra were good couple of months now whereas the Klonopin has been prescribed for him for a couple of years by medical provider for anxiety.  Does not see a neurologist in the office.  Patient denies taking any other prescribed medications regularly. Denies any recent illicit drug use for at least a few months now.    Just about 4-5 days ago was seen at an outside hospital ER and diagnosed with influenza.  Currently patient reports having significant cough.    In the ED here had stable vital signs except for some borderline hypoxia noted to about 88% on room air.  CT chest was negative for PE, per my personal review there are no infiltrates suggestive of pneumonia.  EKG showed no acute ischemic findings.  Lactic acid is  "elevated to 5.3.    * No surgery found *      Hospital Course:   Pt somnolent on evaluation 2/2 sedating medications. 1/2 Bcx on 04/02 positive for gram positive rods. Following rapid PCR/speciation with Bcx contaminant, no growth in other bottle. Symptomatic regimen for influenza B with that infection and running out of klonopin for 48 hours likely precipitating seizure-like activity. Discussed importance of AED/benzo adherence and provided Klonopin paper script to pt for discharge and advised on regimen for influenza care at home. Pt expressed no questions, PCP follow up arranged.      Goals of Care Treatment Preferences:  Code Status: Full Code      SDOH Screening:  The patient declined to be screened for utility difficulties, food insecurity, transport difficulties, housing insecurity, and interpersonal safety, so no concerns could be identified this admission.     Consults:   Consults (From admission, onward)          Status Ordering Provider     Pharmacy to dose Vancomycin consult  Once        Provider:  (Not yet assigned)   Placed in "And" Linked Group    Acknowledged PRIMO GAMINO          Interval History: NAEON, pt refusing labs, but more interactive this morning. Drinking plenty of fluids in light of loose stools. C Diff negative and imodium prescribed.     Review of Systems  Objective:     Vital Signs (Most Recent):  Temp:  (Refused Vitals) (04/04/25 0800)  Pulse: (!) 59 (Refuse vitals) (04/04/25 0800)  Resp: 18 (04/03/25 2314)  BP:  (refused vitals) (04/04/25 0800)  SpO2:  (refused vitals) (04/04/25 0800) Vital Signs (24h Range):  Temp:  [97.6 °F (36.4 °C)-98.9 °F (37.2 °C)] 97.7 °F (36.5 °C)  Pulse:  [59-77] 59  Resp:  [16-20] 18  SpO2:  [95 %-100 %] 100 %  BP: ()/(50-84) 125/74     Weight: 87.2 kg (192 lb 3.9 oz)  Body mass index is 25.36 kg/m².    Intake/Output Summary (Last 24 hours) at 4/4/2025 1318  Last data filed at 4/4/2025 0652  Gross per 24 hour   Intake 1600 ml   Output --   Net " 1600 ml         Physical Exam  Vitals and nursing note reviewed.   HENT:      Head: Normocephalic and atraumatic.   Eyes:      Extraocular Movements: Extraocular movements intact.   Cardiovascular:      Rate and Rhythm: Normal rate and regular rhythm.      Pulses: Normal pulses.   Pulmonary:      Effort: Pulmonary effort is normal. No respiratory distress.      Breath sounds: No wheezing or rales.   Abdominal:      Palpations: Abdomen is soft.      Tenderness: There is no abdominal tenderness. There is no guarding.   Musculoskeletal:      Right lower leg: No edema.      Left lower leg: No edema.   Skin:     General: Skin is warm and dry.      Findings: Erythema (L neck erythema resolved) present.   Neurological:      General: No focal deficit present.      Mental Status: He is oriented to person, place, and time.  Psychiatric:         Mood and Affect: Mood normal.               Significant Labs: All pertinent labs within the past 24 hours have been reviewed.    Significant Imaging: I have reviewed all pertinent imaging results/findings within the past 24 hours.    Assessment & Plan  Seizure  No definitive EEG report I can find  Continue home keppra  Continue home klonopin  Unclear if he's having true clinical seizures  EEG ordered and tolerated for a few hours before pulling off, but pt eating and more alert throughout day after admission with no further seizure-like activity.     Sepsis  Possible sepsis  No bacterial source identified at this time (neg CTA, neg UA)  Continue zosyn and vanc and can discontinue abx if neg blood cultures in 48 hrs  1/2 bottles on 04/02 positive for GPR, follow up PCR identification    Lactic acidosis  Resolved    HIV disease  Home HAART    Anxiety  Home klonopin ordered, pt too lethargic to confirm last date of use. May have run out of meds.     Influenza  Droplet, no benefit in Tamiflu at this point  O2 as warranted    Borderline low oxygen saturation level  Treat underlying issues,  wean as tolerated    Final Active Diagnoses:    Diagnosis Date Noted POA    PRINCIPAL PROBLEM:  Seizure [R56.9] 03/19/2023 Yes    Lactic acidosis [E87.20] 04/02/2025 Yes    Sepsis [A41.9] 04/02/2025 Yes    Influenza [J11.1] 04/02/2025 Yes    Borderline low oxygen saturation level [R79.81] 04/02/2025 Yes    Anxiety [F41.9]  Yes    HIV disease [B20] 10/22/2018 Yes     Chronic      Problems Resolved During this Admission:       Discharged Condition: good    Disposition: Home or Self Care    Follow Up:    Patient Instructions:   No discharge procedures on file.    Significant Diagnostic Studies: N/A    Pending Diagnostic Studies:       Procedure Component Value Units Date/Time    Basic Metabolic Panel (BMP) [[id number]]     Order Status: Sent Lab Status: No result     Specimen: Blood     CBC with Automated Differential [[id number]]     Order Status: Sent Lab Status: No result     Specimen: Blood     Narrative:      The following orders were created for panel order CBC with Automated Differential.  Procedure                               Abnormality         Status                     ---------                               -----------         ------                     CBC with Differential[[id number]]                                                        Please view results for these tests on the individual orders.    CBC with Differential [[id number]]     Order Status: Sent Lab Status: No result     Specimen: Blood     Vancomycin, Random [[id number]]     Order Status: Sent Lab Status: No result     Specimen: Blood            Medications:  Reconciled Home Medications:      Medication List        START taking these medications      clonazePAM 2 MG Tab  Commonly known as: KlonoPIN  Take 1 tablet (2 mg total) by mouth once daily.  Start taking on: April 5, 2025     guaiFENesin 100 mg/5 ml 100 mg/5 mL syrup  Commonly known as: ROBITUSSIN  Take 10 mLs (200 mg total) by mouth every 4 (four) hours as needed for Congestion.             CONTINUE taking these medications      levETIRAcetam 500 MG Tab  Commonly known as: KEPPRA  Take 1 tablet (500 mg total) by mouth 2 (two) times daily.            STOP taking these medications      haloperidoL 10 MG tablet  Commonly known as: HALDOL     OLANZapine 10 MG tablet  Commonly known as: ZyPREXA     ondansetron 4 MG Tbdl  Commonly known as: ZOFRAN-ODT     pregabalin 150 MG capsule  Commonly known as: LYRICA     primidone 50 MG Tab  Commonly known as: MYSOLINE              Indwelling Lines/Drains at time of discharge:   Lines/Drains/Airways       None                   Time spent on the discharge of patient: 40 minutes         Romel Anthony MD  Department of Hospital Medicine  [facility] - Internal Medicine Telemetry

## 2025-04-04 NOTE — PLAN OF CARE
[facility] - Internal Medicine Telemetry  Initial Discharge Assessment       Primary Care Provider: No, Primary Doctor    Admission Diagnosis: Tachycardia [R00.0]  Chest pain [R07.9]    Admission Date: 4/2/2025  Expected Discharge Date: 4/4/2025    Transition of Care Barriers: (P) None    Payor: [employer] BLUE SHIELD / Plan: BCBS BLUE SAVER PPO - HD / Product Type: PPO /     Extended Emergency Contact Information  Primary Emergency Contact: RamsesFernanda  Mobile Phone: [phone]  Relation: Mother    Discharge Plan A: (P) Home  Discharge Plan B: (P) Home      [id number] - [place], [place] - [address]  [address] [address]  Phone: [phone] Fax: [phone]    [facility] [place] [facility] [place] - [address]  [address]  [place] [address]  Phone: [phone] Fax: [phone]    [facility] [place], [place] - [address]  [address]  [address]  [place] [address]  Phone: [phone] Fax: [phone]    [id number] - [place], [place] - [address] [facility] [address]  [address]  [facility] [address]  Phone: [phone] Fax: [phone]      Initial Assessment (most recent)       Adult Discharge Assessment - 04/04/25 1132          Discharge Assessment    Assessment Type Discharge Planning Assessment     Confirmed/corrected address, phone number and insurance Yes     Confirmed Demographics Correct on Facesheet     Source of Information patient     Communicated LORRIE with patient/caregiver Yes     Reason For Admission Seizure     People in Home alone     Do you expect to return to your current living situation? Yes     Do you have help at home or someone to help you manage your care at home? No     Prior to hospitilization cognitive status: Alert/Oriented     Current cognitive status: Alert/Oriented     Walking or Climbing Stairs Difficulty no     Dressing/Bathing Difficulty no      Equipment Currently Used at Home none     Readmission within 30 days? No     Patient currently being followed by outpatient case management? No (P)      Do you currently have service(s) that help you manage your care at home? No (P)      Do you take prescription medications? Yes (P)      Do you have prescription coverage? Yes (P)      Coverage Blue Cross Blue Shield (P)      Do you have any problems affording any of your prescribed medications? No (P)      Is the patient taking medications as prescribed? yes (P)      Who is going to help you get home at discharge? Patient states he will need a ride (P)      How do you get to doctors appointments? family or friend will provide;public transportation (P)      Are you on dialysis? No (P)      Do you take coumadin? No (P)      Discharge Plan A Home (P)      Discharge Plan B Home (P)      DME Needed Upon Discharge  none (P)      Discharge Plan discussed with: Patient (P)      Transition of Care Barriers None (P)         Physical Activity    On average, how many days per week do you engage in moderate to strenuous exercise (like a brisk walk)? 4 days (P)      On average, how many minutes do you engage in exercise at this level? 60 min (P)         Financial Resource Strain    How hard is it for you to pay for the very basics like food, housing, medical care, and heating? Not hard at all (P)         Housing Stability    In the last 12 months, was there a time when you were not able to pay the mortgage or rent on time? No (P)      At any time in the past 12 months, were you homeless or living in a shelter (including now)? No (P)         Transportation Needs    In the past 12 months, has lack of transportation kept you from medical appointments or from getting medications? No (P)      In the past 12 months, has lack of transportation kept you from meetings, work, or from getting things needed for daily living? No (P)         Food Insecurity    Within the past 12 months, you  worried that your food would run out before you got the money to buy more. Patient declined (P)      Within the past 12 months, the food you bought just didn't last and you didn't have money to get more. Patient declined (P)         Stress    Do you feel stress - tense, restless, nervous, or anxious, or unable to sleep at night because your mind is troubled all the time - these days? Only a little (P)         Social Isolation    How often do you feel lonely or isolated from those around you?  Never (P)         Alcohol Use    Q1: How often do you have a drink containing alcohol? Never (P)      Q2: How many drinks containing alcohol do you have on a typical day when you are drinking? Patient does not drink (P)      Q3: How often do you have six or more drinks on one occasion? Never (P)         Utilities    In the past 12 months has the electric, gas, oil, or water company threatened to shut off services in your home? No (P)         Health Literacy    How often do you need to have someone help you when you read instructions, pamphlets, or other written material from your doctor or pharmacy? Never (P)                    CM spoke with patient regarding the discharge plan. Patient stated that he lives alone and has no issues affording food, utilities, or rent. Patient states a friend will drive him or he will use public transportation when he needs a ride.    Patient notified CM he will require transportation from hospital. CM to set up a ride for the patient.     11:46 AM  CM set up patient's lyft ride home. CM notified patient of details for lyft ride. Patient aware lyft will arrive emergency room entrance.    Discharge Plan A and Plan B have been determined by review of patient's clinical status, future medical and therapeutic needs, and coverage/benefits for post-acute care in coordination with multidisciplinary team members.    Servando Culp RN-CM  Case Management  [facility]  [phone]

## 2025-04-04 NOTE — SUBJECTIVE & OBJECTIVE
Interval History: NAEON, pt refusing labs, but more interactive this morning. Drinking plenty of fluids in light of loose stools. C Diff negative and imodium prescribed.     Review of Systems  Objective:     Vital Signs (Most Recent):  Temp:  (Refused Vitals) (04/04/25 0800)  Pulse: (!) 59 (Refuse vitals) (04/04/25 0800)  Resp: 18 (04/03/25 2314)  BP:  (refused vitals) (04/04/25 0800)  SpO2:  (refused vitals) (04/04/25 0800) Vital Signs (24h Range):  Temp:  [97.6 °F (36.4 °C)-98.9 °F (37.2 °C)] 97.7 °F (36.5 °C)  Pulse:  [59-77] 59  Resp:  [16-20] 18  SpO2:  [95 %-100 %] 100 %  BP: ()/(50-84) 125/74     Weight: 87.2 kg (192 lb 3.9 oz)  Body mass index is 25.36 kg/m².    Intake/Output Summary (Last 24 hours) at 4/4/2025 1313  Last data filed at 4/4/2025 0652  Gross per 24 hour   Intake 1600 ml   Output --   Net 1600 ml         Physical Exam  Vitals and nursing note reviewed.   HENT:      Head: Normocephalic and atraumatic.   Eyes:      Extraocular Movements: Extraocular movements intact.   Cardiovascular:      Rate and Rhythm: Normal rate and regular rhythm.      Pulses: Normal pulses.   Pulmonary:      Effort: Pulmonary effort is normal. No respiratory distress.      Breath sounds: No wheezing or rales.   Abdominal:      Palpations: Abdomen is soft.      Tenderness: There is no abdominal tenderness. There is no guarding.   Musculoskeletal:      Right lower leg: No edema.      Left lower leg: No edema.   Skin:     General: Skin is warm and dry.      Findings: Erythema (L neck erythema) present.   Neurological:      General: No focal deficit present.      Mental Status: He is oriented to person, place, and time. He is lethargic.   Psychiatric:         Mood and Affect: Mood normal.               Significant Labs: All pertinent labs within the past 24 hours have been reviewed.    Significant Imaging: I have reviewed all pertinent imaging results/findings within the past 24 hours.

## 2025-04-04 NOTE — NURSING
Pt refused lab draw. Pt says he wants to sleep. When administering per order melatonin pt would allow me to fix his tele lead completely. Pt says, '' he don't want tele, he just want to go to sleep.'' Notify on call MD.

## 2025-04-04 NOTE — PLAN OF CARE
CHW scheduled pcp f/u   Future Appointments   Date Time Provider Department Center   4/11/2025  9:20 AM Ivonne Edwards MD [facility]

## 2025-04-04 NOTE — NURSING
PIV removed per hospital policy.  Pt verbalized understanding of discharge paperwork and denied any questions or concerns.  Pt ambulated off the unit with all personal belongings in hand.  NADN; VSS

## 2025-04-04 NOTE — PROGRESS NOTES
AVS virtually reviewed with Flako Pearce in its entirety with emphasis on diet, medications, follow-up appointments and reasons to return to the ED or contact the [facility] On [facility] Line. Patient also encouraged to utilize their patient portal. Ease and convenience of use reiterated. Education complete and patient voiced understanding. All questions answered. Discharge teaching complete.

## 2025-04-04 NOTE — PLAN OF CARE
Problem: Adult Inpatient Plan of Care  Goal: Plan of Care Review  Outcome: Adequate for Care Transition  Goal: Patient-Specific Goal (Individualized)  Outcome: Adequate for Care Transition  Goal: Absence of Hospital-Acquired Illness or Injury  Outcome: Adequate for Care Transition  Goal: Optimal Comfort and Wellbeing  Outcome: Adequate for Care Transition  Goal: Readiness for Transition of Care  Outcome: Adequate for Care Transition     Problem: Sepsis/Septic Shock  Goal: Optimal Coping  Outcome: Adequate for Care Transition  Goal: Absence of Bleeding  Outcome: Adequate for Care Transition  Goal: Blood Glucose Level Within Targeted Range  Outcome: Adequate for Care Transition  Goal: Absence of Infection Signs and Symptoms  Outcome: Adequate for Care Transition  Goal: Optimal Nutrition Intake  Outcome: Adequate for Care Transition     Problem: Infection  Goal: Absence of Infection Signs and Symptoms  Outcome: Adequate for Care Transition

## 2025-04-04 NOTE — DISCHARGE INSTRUCTIONS
Our goal at [facility] is to always give you outstanding care and exceptional service. You may receive a survey from [employer] by mail, text or e-mail in the next 24-48 hours asking about the care you received with us. The survey should only take 5-10 minutes to complete and is very important to us.     Your feedback provides us with a way to recognize our staff who work tirelessly to provide the best care! Also, your responses help us learn how to improve when your experience was below our aspiration of excellence. We are always looking for ways to improve your stay. We WILL use your feedback to continue making improvements to help us provide the highest quality care. We keep your personal information and feedback confidential. We appreciate your time completing this survey and can't wait to hear from you!!!    We look forward to your continued care with us! Thanks so much for choosing [facility] for your healthcare needs!

## 2025-04-07 LAB — BACTERIA BLD CULT: NORMAL

## 2025-04-07 NOTE — PLAN OF CARE
[facility] - Internal Medicine Telemetry  Discharge Final Note    Primary Care Provider: No, Primary Doctor    Expected Discharge Date: 4/4/2025    Patient medically cleared for discharge. Patient transported home via lyft. Patient cleared from CM standpoint.     Final Discharge Note (most recent)       Final Note - 04/07/25 0837          Final Note    Assessment Type Final Discharge Note     Anticipated Discharge Disposition Home or Self Care     Hospital Resources/Appts/Education Provided Provided patient/caregiver with written discharge plan information;Appointments scheduled and added to AVS        Post-Acute Status    Post-Acute Authorization Other     Coverage Blue Cross Blue Shield     Other Status No Post-Acute Service Needs     Discharge Delays None known at this time (P)                      Important Message from Medicare               Future Appointments   Date Time Provider Department Center   4/11/2025  9:20 AM Ivonne Edwards MD [facility]       Discharge Plan A and Plan B have been determined by review of patient's clinical status, future medical and therapeutic needs, and coverage/benefits for post-acute care in coordination with multidisciplinary team members.    Servando Culp RN-CM  Case Management  [facility]  [phone]

## 2025-04-12 NOTE — PHYSICIAN QUERY
Please clarify the patient's HIV diagnosis:  Asymptomatic HIV infection - no prior diagnosis of AIDS-defining condition or current or prior CD4+ count of <200.

## 2025-06-07 ENCOUNTER — NURSE TRIAGE (OUTPATIENT)
Dept: ADMINISTRATIVE | Facility: CLINIC | Age: 35
End: 2025-06-07
Payer: COMMERCIAL

## 2025-06-07 NOTE — TELEPHONE ENCOUNTER
First attempt to contact patient, no answer, no voicemail box available to leave message.   Second attempt to contact pt, same as above. Phone number verified.   Reason for Disposition   Second attempt to contact caller AND no contact made. Phone number verified.    Protocols used: No Contact or Duplicate Contact Call-A-

## 2025-06-19 ENCOUNTER — HOSPITAL ENCOUNTER (EMERGENCY)
Facility: HOSPITAL | Age: 35
Discharge: HOME | End: 2025-06-20
Attending: EMERGENCY MEDICINE
Payer: COMMERCIAL

## 2025-06-19 DIAGNOSIS — F13.930 BENZODIAZEPINE WITHDRAWAL WITHOUT COMPLICATION: Primary | ICD-10-CM

## 2025-06-19 DIAGNOSIS — R00.0 TACHYCARDIA: ICD-10-CM

## 2025-06-19 PROCEDURE — 93005 ELECTROCARDIOGRAM TRACING: CPT

## 2025-06-19 PROCEDURE — 99284 EMERGENCY DEPT VISIT MOD MDM: CPT | Mod: 25

## 2025-06-19 PROCEDURE — 25000003 PHARM REV CODE 250

## 2025-06-19 PROCEDURE — 85025 COMPLETE CBC W/AUTO DIFF WBC: CPT

## 2025-06-19 PROCEDURE — 83735 ASSAY OF MAGNESIUM: CPT

## 2025-06-19 PROCEDURE — 93010 ELECTROCARDIOGRAM REPORT: CPT | Mod: ,,, | Performed by: STUDENT IN AN ORGANIZED HEALTH CARE EDUCATION/TRAINING PROGRAM

## 2025-06-19 PROCEDURE — 82040 ASSAY OF SERUM ALBUMIN: CPT

## 2025-06-19 RX ORDER — CLONAZEPAM 0.5 MG/1
2 TABLET ORAL
Status: COMPLETED | OUTPATIENT
Start: 2025-06-19 | End: 2025-06-19

## 2025-06-19 RX ADMIN — CLONAZEPAM 2 MG: 0.5 TABLET ORAL at 11:06

## 2025-06-20 VITALS
HEIGHT: 73 IN | BODY MASS INDEX: 22.53 KG/M2 | WEIGHT: 170 LBS | HEART RATE: 85 BPM | RESPIRATION RATE: 18 BRPM | SYSTOLIC BLOOD PRESSURE: 114 MMHG | OXYGEN SATURATION: 97 % | DIASTOLIC BLOOD PRESSURE: 58 MMHG | TEMPERATURE: 98 F

## 2025-06-20 LAB
ABSOLUTE EOSINOPHIL (OHS): 0.04 K/UL
ABSOLUTE MONOCYTE (OHS): 0.61 K/UL (ref 0.3–1)
ABSOLUTE NEUTROPHIL COUNT (OHS): 2.9 K/UL (ref 1.8–7.7)
ALBUMIN SERPL BCP-MCNC: 4.5 G/DL (ref 3.5–5.2)
ALP SERPL-CCNC: 73 UNIT/L (ref 40–150)
ALT SERPL W/O P-5'-P-CCNC: 14 UNIT/L (ref 10–44)
ANION GAP (OHS): 10 MMOL/L (ref 8–16)
AST SERPL-CCNC: 15 UNIT/L (ref 11–45)
BASOPHILS # BLD AUTO: 0.03 K/UL
BASOPHILS NFR BLD AUTO: 0.6 %
BILIRUB SERPL-MCNC: 0.6 MG/DL (ref 0.1–1)
BUN SERPL-MCNC: 17 MG/DL (ref 6–20)
CALCIUM SERPL-MCNC: 9.1 MG/DL (ref 8.7–10.5)
CHLORIDE SERPL-SCNC: 106 MMOL/L (ref 95–110)
CO2 SERPL-SCNC: 23 MMOL/L (ref 23–29)
CREAT SERPL-MCNC: 1.2 MG/DL (ref 0.5–1.4)
ERYTHROCYTE [DISTWIDTH] IN BLOOD BY AUTOMATED COUNT: 13.9 % (ref 11.5–14.5)
GFR SERPLBLD CREATININE-BSD FMLA CKD-EPI: >60 ML/MIN/1.73/M2
GLUCOSE SERPL-MCNC: 181 MG/DL (ref 70–110)
HCT VFR BLD AUTO: 35.3 % (ref 40–54)
HGB BLD-MCNC: 12 GM/DL (ref 14–18)
IMM GRANULOCYTES # BLD AUTO: 0.02 K/UL (ref 0–0.04)
IMM GRANULOCYTES NFR BLD AUTO: 0.4 % (ref 0–0.5)
LYMPHOCYTES # BLD AUTO: 1.75 K/UL (ref 1–4.8)
MAGNESIUM SERPL-MCNC: 2 MG/DL (ref 1.6–2.6)
MCH RBC QN AUTO: 30.5 PG (ref 27–31)
MCHC RBC AUTO-ENTMCNC: 34 G/DL (ref 32–36)
MCV RBC AUTO: 90 FL (ref 82–98)
NUCLEATED RBC (/100WBC) (OHS): 0 /100 WBC
OHS QRS DURATION: 102 MS
OHS QTC CALCULATION: 427 MS
PLATELET # BLD AUTO: 208 K/UL (ref 150–450)
PMV BLD AUTO: 10.5 FL (ref 9.2–12.9)
POTASSIUM SERPL-SCNC: 4 MMOL/L (ref 3.5–5.1)
PROT SERPL-MCNC: 6.7 GM/DL (ref 6–8.4)
RBC # BLD AUTO: 3.94 M/UL (ref 4.6–6.2)
RELATIVE EOSINOPHIL (OHS): 0.7 %
RELATIVE LYMPHOCYTE (OHS): 32.7 % (ref 18–48)
RELATIVE MONOCYTE (OHS): 11.4 % (ref 4–15)
RELATIVE NEUTROPHIL (OHS): 54.2 % (ref 38–73)
SODIUM SERPL-SCNC: 139 MMOL/L (ref 136–145)
WBC # BLD AUTO: 5.35 K/UL (ref 3.9–12.7)

## 2025-06-20 RX ORDER — BUTALBITAL, ACETAMINOPHEN AND CAFFEINE 50; 325; 40 MG/1; MG/1; MG/1
1 TABLET ORAL EVERY 4 HOURS PRN
Qty: 10 TABLET | Refills: 0 | Status: SHIPPED | OUTPATIENT
Start: 2025-06-20 | End: 2025-06-20 | Stop reason: RX

## 2025-06-20 RX ORDER — BUTALBITAL, ACETAMINOPHEN AND CAFFEINE 300; 40; 50 MG/1; MG/1; MG/1
1 CAPSULE ORAL EVERY 4 HOURS PRN
Qty: 10 CAPSULE | Refills: 0 | Status: SHIPPED | OUTPATIENT
Start: 2025-06-20
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
## 2025-06-20 NOTE — ED NOTES
Pt ambulated with standby assist. Gait steady. Pt denies dizziness, fatigue or SOB. Pt tolerated walking test well. MD aware

## 2025-06-20 NOTE — DISCHARGE INSTRUCTIONS
You were seen in the ER for a medication refill. You were given a dose of your Klonopin in the ER. We recommend calling your neurologist and rescheduling your MRI and EEG. Additionally, we recommend discussing medications to control your seizures and headaches with your neurologist again. We believe you have a dependence on benzodiazepines which from the history you provided and looking through your chart, is getting worse. This is a very serious condition, and could lead to death. We provided information below for rehab facilities. We recommend you go tomorrow. You should return to the emergency room if you develop severe chest pain, seizures, or difficulty breathing.    [facility]: [phone]     [facility] [phone]
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
## 2025-06-24 ENCOUNTER — HOSPITAL ENCOUNTER (EMERGENCY)
Facility: HOSPITAL | Age: 35
Discharge: HOME | End: 2025-06-24
Attending: EMERGENCY MEDICINE
Payer: COMMERCIAL

## 2025-06-24 VITALS
DIASTOLIC BLOOD PRESSURE: 70 MMHG | WEIGHT: 165 LBS | HEIGHT: 73 IN | HEART RATE: 62 BPM | TEMPERATURE: 99 F | SYSTOLIC BLOOD PRESSURE: 117 MMHG | BODY MASS INDEX: 21.87 KG/M2 | RESPIRATION RATE: 18 BRPM | OXYGEN SATURATION: 100 %

## 2025-06-24 DIAGNOSIS — R56.9 SEIZURE: ICD-10-CM

## 2025-06-24 DIAGNOSIS — Z91.148 NONCOMPLIANCE WITH MEDICATION REGIMEN: Primary | ICD-10-CM

## 2025-06-24 LAB
BUN SERPL-MCNC: 8 MG/DL (ref 6–30)
CHLORIDE SERPL-SCNC: 104 MMOL/L (ref 95–110)
CREAT SERPL-MCNC: 1.1 MG/DL (ref 0.5–1.4)
GLUCOSE SERPL-MCNC: 94 MG/DL (ref 70–110)
HCT VFR BLD CALC: 38 %PCV (ref 36–54)
OHS QRS DURATION: 98 MS
OHS QTC CALCULATION: 397 MS
POC IONIZED CALCIUM: 1.23 MMOL/L (ref 1.06–1.42)
POC TCO2 (MEASURED): 22 MMOL/L (ref 23–29)
POTASSIUM BLD-SCNC: 3.8 MMOL/L (ref 3.5–5.1)
SAMPLE: ABNORMAL
SODIUM BLD-SCNC: 140 MMOL/L (ref 136–145)

## 2025-06-24 PROCEDURE — 96361 HYDRATE IV INFUSION ADD-ON: CPT

## 2025-06-24 PROCEDURE — 80047 BASIC METABLC PNL IONIZED CA: CPT

## 2025-06-24 PROCEDURE — 63600175 PHARM REV CODE 636 W HCPCS

## 2025-06-24 PROCEDURE — 93005 ELECTROCARDIOGRAM TRACING: CPT

## 2025-06-24 PROCEDURE — 96374 THER/PROPH/DIAG INJ IV PUSH: CPT

## 2025-06-24 PROCEDURE — 99284 EMERGENCY DEPT VISIT MOD MDM: CPT | Mod: 25

## 2025-06-24 PROCEDURE — 96375 TX/PRO/DX INJ NEW DRUG ADDON: CPT

## 2025-06-24 PROCEDURE — 93010 ELECTROCARDIOGRAM REPORT: CPT | Mod: ,,, | Performed by: STUDENT IN AN ORGANIZED HEALTH CARE EDUCATION/TRAINING PROGRAM

## 2025-06-24 RX ORDER — PROCHLORPERAZINE EDISYLATE 5 MG/ML
10 INJECTION INTRAMUSCULAR; INTRAVENOUS
Status: COMPLETED | OUTPATIENT
Start: 2025-06-24 | End: 2025-06-24

## 2025-06-24 RX ORDER — KETOROLAC TROMETHAMINE 30 MG/ML
10 INJECTION, SOLUTION INTRAMUSCULAR; INTRAVENOUS
Status: COMPLETED | OUTPATIENT
Start: 2025-06-24 | End: 2025-06-24

## 2025-06-24 RX ORDER — LEVETIRACETAM 500 MG/1
500 TABLET ORAL 2 TIMES DAILY
Qty: 60 TABLET | Refills: 3 | Status: SHIPPED | OUTPATIENT
Start: 2025-06-24 | End: 2025-10-22

## 2025-06-24 RX ADMIN — KETOROLAC TROMETHAMINE 10 MG: 30 INJECTION, SOLUTION INTRAMUSCULAR; INTRAVENOUS at 11:06

## 2025-06-24 RX ADMIN — PROCHLORPERAZINE EDISYLATE 10 MG: 5 INJECTION INTRAMUSCULAR; INTRAVENOUS at 11:06

## 2025-06-24 RX ADMIN — SODIUM CHLORIDE, POTASSIUM CHLORIDE, SODIUM LACTATE AND CALCIUM CHLORIDE 500 ML: 600; 310; 30; 20 INJECTION, SOLUTION INTRAVENOUS at 11:06

## 2025-06-24 NOTE — ED NOTES
I-STAT Chem-8+ Results:   Value Reference Range   Sodium 140 136-145 mmol/L   Potassium  3.8 3.5-5.1 mmol/L   Chloride 104  mmol/L   Ionized Calcium 1.23 1.06-1.42 mmol/L   CO2 (measured) 22 23-29 mmol/L   Glucose 94  mg/dL   BUN 8 6-30 mg/dL   Creatinine 1.1 0.5-1.4 mg/dL   Hematocrit 38 36-54%

## 2025-06-24 NOTE — DISCHARGE INSTRUCTIONS
Please follow up with your primary care doctor as well as your neurologist about your seizure disorder.
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
## 2025-07-20 ENCOUNTER — HOSPITAL ENCOUNTER (OUTPATIENT)
Facility: HOSPITAL | Age: 35
Discharge: HOME | End: 2025-07-21
Attending: EMERGENCY MEDICINE | Admitting: EMERGENCY MEDICINE
Payer: COMMERCIAL

## 2025-07-20 ENCOUNTER — HOSPITAL ENCOUNTER (EMERGENCY)
Facility: HOSPITAL | Age: 35
Discharge: HOME | End: 2025-07-20
Attending: EMERGENCY MEDICINE
Payer: COMMERCIAL

## 2025-07-20 VITALS
RESPIRATION RATE: 19 BRPM | OXYGEN SATURATION: 97 % | HEART RATE: 60 BPM | SYSTOLIC BLOOD PRESSURE: 118 MMHG | DIASTOLIC BLOOD PRESSURE: 79 MMHG | TEMPERATURE: 98 F | WEIGHT: 150 LBS | BODY MASS INDEX: 19.79 KG/M2

## 2025-07-20 DIAGNOSIS — F19.90 SUBSTANCE USE DISORDER: ICD-10-CM

## 2025-07-20 DIAGNOSIS — F15.90 STIMULANT USE DISORDER: ICD-10-CM

## 2025-07-20 DIAGNOSIS — R07.9 CHEST PAIN: ICD-10-CM

## 2025-07-20 DIAGNOSIS — G40.909 RECURRENT SEIZURES: Primary | ICD-10-CM

## 2025-07-20 DIAGNOSIS — R56.9 SEIZURE-LIKE ACTIVITY: Primary | ICD-10-CM

## 2025-07-20 DIAGNOSIS — B20 HIV DISEASE: Chronic | ICD-10-CM

## 2025-07-20 LAB
ABSOLUTE EOSINOPHIL (OHS): 0.01 K/UL
ABSOLUTE MONOCYTE (OHS): 0.32 K/UL (ref 0.3–1)
ABSOLUTE NEUTROPHIL COUNT (OHS): 4.53 K/UL (ref 1.8–7.7)
ALBUMIN SERPL BCP-MCNC: 4.1 G/DL (ref 3.5–5.2)
ALP SERPL-CCNC: 76 UNIT/L (ref 40–150)
ALT SERPL W/O P-5'-P-CCNC: 17 UNIT/L (ref 10–44)
ANION GAP (OHS): 11 MMOL/L (ref 8–16)
AST SERPL-CCNC: 18 UNIT/L (ref 11–45)
BASOPHILS # BLD AUTO: 0.02 K/UL
BASOPHILS NFR BLD AUTO: 0.4 %
BILIRUB SERPL-MCNC: 0.4 MG/DL (ref 0.1–1)
BIPAP: 0
BIPAP: 0
BUN SERPL-MCNC: 11 MG/DL (ref 6–20)
CALCIUM SERPL-MCNC: 9.1 MG/DL (ref 8.7–10.5)
CHLORIDE SERPL-SCNC: 109 MMOL/L (ref 95–110)
CO2 SERPL-SCNC: 20 MMOL/L (ref 23–29)
CREAT SERPL-MCNC: 0.9 MG/DL (ref 0.5–1.4)
ERYTHROCYTE [DISTWIDTH] IN BLOOD BY AUTOMATED COUNT: 13.6 % (ref 11.5–14.5)
FIO2: 21 %
FIO2: 21 %
GFR SERPLBLD CREATININE-BSD FMLA CKD-EPI: >60 ML/MIN/1.73/M2
GLUCOSE SERPL-MCNC: 99 MG/DL (ref 70–110)
HCT VFR BLD AUTO: 41 % (ref 40–54)
HGB BLD-MCNC: 14 GM/DL (ref 14–18)
IMM GRANULOCYTES # BLD AUTO: 0.01 K/UL (ref 0–0.04)
IMM GRANULOCYTES NFR BLD AUTO: 0.2 % (ref 0–0.5)
LDH SERPL L TO P-CCNC: 1.2 MMOL/L
LDH SERPL L TO P-CCNC: 3.4 MMOL/L (ref 0.5–2.2)
LYMPHOCYTES # BLD AUTO: 0.72 K/UL (ref 1–4.8)
MAGNESIUM SERPL-MCNC: 2.1 MG/DL (ref 1.6–2.6)
MCH RBC QN AUTO: 30.4 PG (ref 27–31)
MCHC RBC AUTO-ENTMCNC: 34.1 G/DL (ref 32–36)
MCV RBC AUTO: 89 FL (ref 82–98)
NUCLEATED RBC (/100WBC) (OHS): 0 /100 WBC
PLATELET # BLD AUTO: 234 K/UL (ref 150–450)
PMV BLD AUTO: 9.6 FL (ref 9.2–12.9)
POC PERFORMED BY: ABNORMAL
POC PERFORMED BY: NORMAL
POC TEMPERATURE: 37 C
POC TEMPERATURE: 37 C
POTASSIUM SERPL-SCNC: 4.2 MMOL/L (ref 3.5–5.1)
PROLACTIN SERPL IA-MCNC: 13 NG/ML (ref 3.5–19.4)
PROT SERPL-MCNC: 7 GM/DL (ref 6–8.4)
RBC # BLD AUTO: 4.61 M/UL (ref 4.6–6.2)
RELATIVE EOSINOPHIL (OHS): 0.2 %
RELATIVE LYMPHOCYTE (OHS): 12.8 % (ref 18–48)
RELATIVE MONOCYTE (OHS): 5.7 % (ref 4–15)
RELATIVE NEUTROPHIL (OHS): 80.7 % (ref 38–73)
SODIUM SERPL-SCNC: 140 MMOL/L (ref 136–145)
SPECIMEN SOURCE: ABNORMAL
SPECIMEN SOURCE: NORMAL
WBC # BLD AUTO: 5.61 K/UL (ref 3.9–12.7)

## 2025-07-20 PROCEDURE — 96374 THER/PROPH/DIAG INJ IV PUSH: CPT

## 2025-07-20 PROCEDURE — G0378 HOSPITAL OBSERVATION PER HR: HCPCS

## 2025-07-20 PROCEDURE — 63600175 PHARM REV CODE 636 W HCPCS: Performed by: EMERGENCY MEDICINE

## 2025-07-20 PROCEDURE — 80177 DRUG SCRN QUAN LEVETIRACETAM: CPT | Performed by: EMERGENCY MEDICINE

## 2025-07-20 PROCEDURE — 85025 COMPLETE CBC W/AUTO DIFF WBC: CPT | Performed by: EMERGENCY MEDICINE

## 2025-07-20 PROCEDURE — 80053 COMPREHEN METABOLIC PANEL: CPT | Performed by: EMERGENCY MEDICINE

## 2025-07-20 PROCEDURE — 83605 ASSAY OF LACTIC ACID: CPT

## 2025-07-20 PROCEDURE — 25000003 PHARM REV CODE 250: Performed by: EMERGENCY MEDICINE

## 2025-07-20 PROCEDURE — 87536 HIV-1 QUANT&REVRSE TRNSCRPJ: CPT | Performed by: STUDENT IN AN ORGANIZED HEALTH CARE EDUCATION/TRAINING PROGRAM

## 2025-07-20 PROCEDURE — 99285 EMERGENCY DEPT VISIT HI MDM: CPT | Mod: 25,27

## 2025-07-20 PROCEDURE — 86361 T CELL ABSOLUTE COUNT: CPT | Performed by: STUDENT IN AN ORGANIZED HEALTH CARE EDUCATION/TRAINING PROGRAM

## 2025-07-20 PROCEDURE — 83735 ASSAY OF MAGNESIUM: CPT | Performed by: EMERGENCY MEDICINE

## 2025-07-20 PROCEDURE — 99284 EMERGENCY DEPT VISIT MOD MDM: CPT | Mod: 25

## 2025-07-20 PROCEDURE — 99900035 HC TECH TIME PER 15 MIN (STAT)

## 2025-07-20 PROCEDURE — 96361 HYDRATE IV INFUSION ADD-ON: CPT

## 2025-07-20 PROCEDURE — 84146 ASSAY OF PROLACTIN: CPT | Performed by: EMERGENCY MEDICINE

## 2025-07-20 PROCEDURE — 94761 N-INVAS EAR/PLS OXIMETRY MLT: CPT

## 2025-07-20 RX ORDER — LEVETIRACETAM 500 MG/1
500 TABLET ORAL 2 TIMES DAILY
Status: DISCONTINUED | OUTPATIENT
Start: 2025-07-20 | End: 2025-07-21

## 2025-07-20 RX ORDER — IBUPROFEN 200 MG
16 TABLET ORAL
Status: DISCONTINUED | OUTPATIENT
Start: 2025-07-20 | End: 2025-07-21 | Stop reason: HOSPADM

## 2025-07-20 RX ORDER — GLUCAGON 1 MG
1 KIT INJECTION
Status: DISCONTINUED | OUTPATIENT
Start: 2025-07-20 | End: 2025-07-21 | Stop reason: HOSPADM

## 2025-07-20 RX ORDER — LEVETIRACETAM 500 MG/5ML
1500 INJECTION, SOLUTION, CONCENTRATE INTRAVENOUS
Status: COMPLETED | OUTPATIENT
Start: 2025-07-20 | End: 2025-07-20

## 2025-07-20 RX ORDER — IBUPROFEN 200 MG
24 TABLET ORAL
Status: DISCONTINUED | OUTPATIENT
Start: 2025-07-20 | End: 2025-07-21 | Stop reason: HOSPADM

## 2025-07-20 RX ORDER — SODIUM CHLORIDE 0.9 % (FLUSH) 0.9 %
10 SYRINGE (ML) INJECTION EVERY 12 HOURS PRN
Status: DISCONTINUED | OUTPATIENT
Start: 2025-07-20 | End: 2025-07-21 | Stop reason: HOSPADM

## 2025-07-20 RX ORDER — SODIUM CHLORIDE, SODIUM LACTATE, POTASSIUM CHLORIDE, CALCIUM CHLORIDE 600; 310; 30; 20 MG/100ML; MG/100ML; MG/100ML; MG/100ML
INJECTION, SOLUTION INTRAVENOUS CONTINUOUS
Status: DISCONTINUED | OUTPATIENT
Start: 2025-07-21 | End: 2025-07-21 | Stop reason: HOSPADM

## 2025-07-20 RX ORDER — LEVETIRACETAM 500 MG/1
500 TABLET ORAL 2 TIMES DAILY
Qty: 60 TABLET | Refills: 3 | Status: SHIPPED | OUTPATIENT
Start: 2025-07-20 | End: 2025-11-17

## 2025-07-20 RX ORDER — CLONAZEPAM 0.5 MG/1
2 TABLET ORAL DAILY
Status: DISCONTINUED | OUTPATIENT
Start: 2025-07-21 | End: 2025-07-20

## 2025-07-20 RX ORDER — BUTALBITAL, ACETAMINOPHEN AND CAFFEINE 50; 325; 40 MG/1; MG/1; MG/1
1 TABLET ORAL EVERY 4 HOURS PRN
Status: DISCONTINUED | OUTPATIENT
Start: 2025-07-20 | End: 2025-07-21 | Stop reason: HOSPADM

## 2025-07-20 RX ORDER — TALC
6 POWDER (GRAM) TOPICAL NIGHTLY PRN
Status: DISCONTINUED | OUTPATIENT
Start: 2025-07-20 | End: 2025-07-21 | Stop reason: HOSPADM

## 2025-07-20 RX ORDER — ENOXAPARIN SODIUM 100 MG/ML
40 INJECTION SUBCUTANEOUS EVERY 24 HOURS
Status: DISCONTINUED | OUTPATIENT
Start: 2025-07-20 | End: 2025-07-21 | Stop reason: HOSPADM

## 2025-07-20 RX ORDER — SODIUM CHLORIDE 0.9 % (FLUSH) 0.9 %
10 SYRINGE (ML) INJECTION
Status: DISCONTINUED | OUTPATIENT
Start: 2025-07-20 | End: 2025-07-21 | Stop reason: HOSPADM

## 2025-07-20 RX ORDER — NALOXONE HCL 0.4 MG/ML
0.02 VIAL (ML) INJECTION
Status: DISCONTINUED | OUTPATIENT
Start: 2025-07-20 | End: 2025-07-21 | Stop reason: HOSPADM

## 2025-07-20 RX ADMIN — SODIUM CHLORIDE 2040 ML: 9 INJECTION, SOLUTION INTRAVENOUS at 05:07

## 2025-07-20 RX ADMIN — LEVETIRACETAM 1500 MG: 500 INJECTION, SOLUTION, CONCENTRATE INTRAVENOUS at 03:07

## 2025-07-20 RX ADMIN — SODIUM CHLORIDE 1000 ML: 9 INJECTION, SOLUTION INTRAVENOUS at 04:07
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
## 2025-07-20 NOTE — ED TRIAGE NOTES
Pt arrives Via [facility]ms from with reports of a seizure. Pt states seizure lasted 3hrs. States he fell a couple times during the seizure, denies hitting head,-blood thinners, and endorse LOC. Received 10mg of versed with EMS. Has hx of HIV and pseudoseizures. Is an IV Drug user and states its been several weeks since using .

## 2025-07-21 VITALS
SYSTOLIC BLOOD PRESSURE: 142 MMHG | DIASTOLIC BLOOD PRESSURE: 72 MMHG | RESPIRATION RATE: 16 BRPM | TEMPERATURE: 98 F | OXYGEN SATURATION: 97 % | HEART RATE: 64 BPM

## 2025-07-21 PROBLEM — F15.90 STIMULANT USE DISORDER: Status: ACTIVE | Noted: 2025-07-21

## 2025-07-21 PROBLEM — F19.959 SUBSTANCE-INDUCED PSYCHOTIC DISORDER: Status: RESOLVED | Noted: 2018-10-22 | Resolved: 2025-07-21

## 2025-07-21 LAB
ABSOLUTE EOSINOPHIL (OHS): 0.06 K/UL
ABSOLUTE MONOCYTE (OHS): 0.71 K/UL (ref 0.3–1)
ABSOLUTE NEUTROPHIL COUNT (OHS): 7.31 K/UL (ref 1.8–7.7)
ALBUMIN SERPL BCP-MCNC: 4.2 G/DL (ref 3.5–5.2)
ALP SERPL-CCNC: 78 UNIT/L (ref 40–150)
ALT SERPL W/O P-5'-P-CCNC: 18 UNIT/L (ref 10–44)
AMPHET UR QL SCN: ABNORMAL
ANION GAP (OHS): 14 MMOL/L (ref 8–16)
AST SERPL-CCNC: 28 UNIT/L (ref 11–45)
BARBITURATE SCN PRESENT UR: NEGATIVE
BASOPHILS # BLD AUTO: 0.05 K/UL
BASOPHILS NFR BLD AUTO: 0.4 %
BENZODIAZ UR QL SCN: ABNORMAL
BILIRUB SERPL-MCNC: 0.5 MG/DL (ref 0.1–1)
BUN SERPL-MCNC: 11 MG/DL (ref 6–20)
CALCIUM SERPL-MCNC: 9.4 MG/DL (ref 8.7–10.5)
CANNABINOIDS UR QL SCN: ABNORMAL
CD3+CD4+ CELLS # SPEC: 708 CELLS/UL (ref 300–1400)
CD3+CD4+ CELLS NFR BLD: 27.97 % (ref 28–57)
CHLORIDE SERPL-SCNC: 107 MMOL/L (ref 95–110)
CO2 SERPL-SCNC: 18 MMOL/L (ref 23–29)
COCAINE UR QL SCN: NEGATIVE
CREAT SERPL-MCNC: 0.9 MG/DL (ref 0.5–1.4)
CREAT UR-MCNC: 112 MG/DL (ref 23–375)
ERYTHROCYTE [DISTWIDTH] IN BLOOD BY AUTOMATED COUNT: 13.8 % (ref 11.5–14.5)
GFR SERPLBLD CREATININE-BSD FMLA CKD-EPI: >60 ML/MIN/1.73/M2
GLUCOSE SERPL-MCNC: 76 MG/DL (ref 70–110)
HCT VFR BLD AUTO: 42.8 % (ref 40–54)
HGB BLD-MCNC: 14.5 GM/DL (ref 14–18)
HIV1 RNA SERPL NAA+PROBE-LOG#: NOT DETECTED {LOG_COPIES}/ML
IMM GRANULOCYTES # BLD AUTO: 0.03 K/UL (ref 0–0.04)
IMM GRANULOCYTES NFR BLD AUTO: 0.3 % (ref 0–0.5)
LABORATORY COMMENT REPORT: ABNORMAL
LYMPHOCYTES # BLD AUTO: 3.06 K/UL (ref 1–4.8)
MAGNESIUM SERPL-MCNC: 1.9 MG/DL (ref 1.6–2.6)
MCH RBC QN AUTO: 30.3 PG (ref 27–31)
MCHC RBC AUTO-ENTMCNC: 33.9 G/DL (ref 32–36)
MCV RBC AUTO: 90 FL (ref 82–98)
METHADONE UR QL SCN: NEGATIVE
NUCLEATED RBC (/100WBC) (OHS): 0 /100 WBC
OHS QRS DURATION: 100 MS
OHS QTC CALCULATION: 403 MS
OPIATES UR QL SCN: NEGATIVE
PCP UR QL: NEGATIVE
PHOSPHATE SERPL-MCNC: 3.6 MG/DL (ref 2.7–4.5)
PLATELET # BLD AUTO: 296 K/UL (ref 150–450)
PMV BLD AUTO: 10 FL (ref 9.2–12.9)
POTASSIUM SERPL-SCNC: 4.8 MMOL/L (ref 3.5–5.1)
PROT SERPL-MCNC: 7.3 GM/DL (ref 6–8.4)
RBC # BLD AUTO: 4.78 M/UL (ref 4.6–6.2)
RELATIVE EOSINOPHIL (OHS): 0.5 %
RELATIVE LYMPHOCYTE (OHS): 27.3 % (ref 18–48)
RELATIVE MONOCYTE (OHS): 6.3 % (ref 4–15)
RELATIVE NEUTROPHIL (OHS): 65.2 % (ref 38–73)
SODIUM SERPL-SCNC: 139 MMOL/L (ref 136–145)
WBC # BLD AUTO: 11.22 K/UL (ref 3.9–12.7)

## 2025-07-21 PROCEDURE — 84100 ASSAY OF PHOSPHORUS: CPT | Performed by: STUDENT IN AN ORGANIZED HEALTH CARE EDUCATION/TRAINING PROGRAM

## 2025-07-21 PROCEDURE — 63600175 PHARM REV CODE 636 W HCPCS: Performed by: STUDENT IN AN ORGANIZED HEALTH CARE EDUCATION/TRAINING PROGRAM

## 2025-07-21 PROCEDURE — 80307 DRUG TEST PRSMV CHEM ANLYZR: CPT | Performed by: EMERGENCY MEDICINE

## 2025-07-21 PROCEDURE — 96361 HYDRATE IV INFUSION ADD-ON: CPT

## 2025-07-21 PROCEDURE — 83735 ASSAY OF MAGNESIUM: CPT | Performed by: STUDENT IN AN ORGANIZED HEALTH CARE EDUCATION/TRAINING PROGRAM

## 2025-07-21 PROCEDURE — 99214 OFFICE O/P EST MOD 30 MIN: CPT | Mod: ,,, | Performed by: PSYCHIATRY & NEUROLOGY

## 2025-07-21 PROCEDURE — 96374 THER/PROPH/DIAG INJ IV PUSH: CPT

## 2025-07-21 PROCEDURE — 85025 COMPLETE CBC W/AUTO DIFF WBC: CPT | Performed by: STUDENT IN AN ORGANIZED HEALTH CARE EDUCATION/TRAINING PROGRAM

## 2025-07-21 PROCEDURE — G0378 HOSPITAL OBSERVATION PER HR: HCPCS

## 2025-07-21 PROCEDURE — 80053 COMPREHEN METABOLIC PANEL: CPT | Performed by: STUDENT IN AN ORGANIZED HEALTH CARE EDUCATION/TRAINING PROGRAM

## 2025-07-21 RX ORDER — LEVETIRACETAM 500 MG/1
500 TABLET ORAL 2 TIMES DAILY
Status: DISCONTINUED | OUTPATIENT
Start: 2025-07-21 | End: 2025-07-21 | Stop reason: HOSPADM

## 2025-07-21 RX ORDER — LEVETIRACETAM 500 MG/5ML
500 INJECTION, SOLUTION, CONCENTRATE INTRAVENOUS EVERY 12 HOURS
Status: DISCONTINUED | OUTPATIENT
Start: 2025-07-21 | End: 2025-07-21

## 2025-07-21 RX ADMIN — SODIUM CHLORIDE, SODIUM LACTATE, POTASSIUM CHLORIDE, CALCIUM CHLORIDE: 600; 310; 30; 20 INJECTION, SOLUTION INTRAVENOUS at 12:07

## 2025-07-21 RX ADMIN — LEVETIRACETAM 500 MG: 500 INJECTION, SOLUTION, CONCENTRATE INTRAVENOUS at 12:07

## 2025-07-21 NOTE — ED NOTES
Patient refusing to turn over and cooperate with RN to start another IV. Patient started. Patient responding to questions but will not cooperate with POC. MD and SARAH notified.

## 2025-07-21 NOTE — ED TRIAGE NOTES
"Pt presents to ED with neck stiffness after being recently d/c. Denies chest pain, sob, or abd pain. States "I have seizures and had one while I was waiting for the bus and walking into triage." Hx of drug use states "I haven't used for a few days." Presents with seizure like activity, vitally stable. No other complaints at this time.  "

## 2025-07-21 NOTE — ED NOTES
Assumed care of the patient. Pt on continuous cardiac monitoring, continuous pulse oximetry, and automatic BP cuff cycling Q15min. Pt in street clothes, side rails up X2, bed low and locked, and call light is placed within reach. No family/visitors at bedside at this time. Pt denies any complaints or needs.

## 2025-07-21 NOTE — ED NOTES
RN at bedside. Pt's writhing activity continues. Still answering questions during episode. Pt still refusing placement of monitoring equipment.

## 2025-07-21 NOTE — SUBJECTIVE & OBJECTIVE
Past Medical History:   Diagnosis Date    Bipolar 1 disorder     PHU (generalized anxiety disorder)     Human immunodeficiency virus (HIV) disease     normal CD4 as of 12/22    Panic disorder     Seizures     Substance use disorder        History reviewed. No pertinent surgical history.    Review of patient's allergies indicates:   Allergen Reactions    Clindamycin      Per report from [facility]       Current Facility-Administered Medications on File Prior to Encounter   Medication    [COMPLETED] levETIRAcetam injection 1,500 mg    [COMPLETED] sodium chloride 0.9% bolus 1,000 mL 1,000 mL    [COMPLETED] sodium chloride 0.9% bolus 2,040 mL 2,040 mL     Current Outpatient Medications on File Prior to Encounter   Medication Sig    butalbital-acetaminophen-caff -40 mg Cap Take 1 capsule by mouth every 4 (four) hours as needed (pain).    clonazePAM (KLONOPIN) 2 MG Tab Take 1 tablet (2 mg total) by mouth once daily.    darunavir/cob/emtri/tenof alaf (SYMTUZA ORAL) Take by mouth.    levETIRAcetam (KEPPRA) 500 MG Tab Take 1 tablet (500 mg total) by mouth 2 (two) times daily.    [DISCONTINUED] levETIRAcetam (KEPPRA) 500 MG Tab Take 1 tablet (500 mg total) by mouth 2 (two) times daily.     Family History    None       Tobacco Use    Smoking status: Some Days     Types: Cigarettes    Smokeless tobacco: Never   Vaping Use    Vaping status: Never Used   Substance and Sexual Activity    Alcohol use: Yes     Comment: occasionally     Drug use: Yes     Frequency: 4.0 times per week     Types: Amphetamines, Marijuana    Sexual activity: Not on file     Review of Systems   Neurological:  Positive for seizures.   Psychiatric/Behavioral:  Positive for behavioral problems. The patient is nervous/anxious.      Objective:     Vital Signs (Most Recent):  Temp: 97.6 °F (36.4 °C) (07/20/25 2001)  Pulse: (!) 45 (07/20/25 2244)  Resp: 15 (07/20/25 2244)  BP: (!) 123/98 (07/20/25 2001)  SpO2: 99 % (07/20/25 2244) Vital Signs (24h  Range):  Temp:  [97.6 °F (36.4 °C)-97.7 °F (36.5 °C)] 97.6 °F (36.4 °C)  Pulse:  [] 45  Resp:  [13-24] 15  SpO2:  [97 %-100 %] 99 %  BP: ()/(56-98) 123/98        There is no height or weight on file to calculate BMI.     Physical Exam  Constitutional:       General: He is not in acute distress.     Appearance: He is not ill-appearing.   Cardiovascular:      Rate and Rhythm: Normal rate and regular rhythm.      Pulses: Normal pulses.      Heart sounds: Normal heart sounds.   Pulmonary:      Effort: Pulmonary effort is normal.      Breath sounds: Normal breath sounds.   Abdominal:      General: Bowel sounds are normal.      Palpations: Abdomen is soft.   Musculoskeletal:      Right lower leg: No edema.      Left lower leg: No edema.   Skin:     Comments: abrasions and bruising in his body in different stages of healing.    Neurological:      Mental Status: He is oriented to person, place, and time.      Comments: Exhibiting hyperextension of the neck but responding appropriately to questions                Significant Labs: All pertinent labs within the past 24 hours have been reviewed.    Significant Imaging: I have reviewed all pertinent imaging results/findings within the past 24 hours.  I have reviewed and interpreted all pertinent imaging results/findings within the past 24 hours.

## 2025-07-21 NOTE — ED NOTES
Patient refusing vital sign monitoring, patient will not allow RN to place monitoring equipement on patient

## 2025-07-21 NOTE — ED NOTES
Pt found walking out of room into [facility]. Educated on fall precautions. Pt had removed fall risk wristband. Provided with urinal and instructed to remain in the bed.

## 2025-07-21 NOTE — PLAN OF CARE
[facility] - Emergency Dept  Discharge Final Note    Primary Care Provider: Obi Perez MD    Expected Discharge Date: 7/21/2025    Final Discharge Note (most recent)       Final Note - 07/21/25 1718          Final Note    Assessment Type Final Discharge Note (P)      Anticipated Discharge Disposition Home or Self Care (P)      Hospital Resources/Appts/Education Provided Provided patient/caregiver with written discharge plan information (P)         Post-Acute Status    Discharge Delays None known at this time (P)                      Important Message from Medicare

## 2025-07-21 NOTE — HOSPITAL COURSE
"CTH completed- no abnormalities. HIV RNA undetectable. CD4 count w/in 700s. Patient continued to ask for klonopin, and when denied, he started to have eyes rollback while he continued to speak to provider, and then he would arch his back up in the air. He would then speak to me, and then state that he keeps having "seizures." Patient declined EEG. Psych reviewed- no acute psychosis at this time, and they agreed with holding klonopin. Patient was instructed to continue with keppra alone, and follow-up with outpatient neurologist. UDS +iv for benzos, amphetamines, and marijuana. Pt deemed appropriate for discharge. Plan discussed with pt, who was agreeable and amenable; medications were discussed and reviewed, outpatient follow-up scheduled, ER precautions were given, all questions were answered to the pt's satisfaction, and Luke was subsequently discharged.     Physical Exam  Gen: in NAD, appears stated age, appears disheveled   Neuro: AAOx3, motor, sensory, and strength grossly intact BL  HEENT: NTNC, EOMI, PERRL, MMM  CVS: RRR, no m/r/g; S1/S2 auscultated with no S3 or S4; capillary refill < 2 sec  Resp: lungs CTAB, no w/r/r; no belabored breathing or accessory muscle use appreciated   Abd: BS+ in all 4 quadrants; NTND, soft to palpation; no organomegaly appreciated   Extrem: pulses full, equal, and regular over all 4 extremities; no UE or LE edema BL  Skin: scattered abrasions of upper and lower ext + face, dirt appreciated on soles of feet      "

## 2025-07-21 NOTE — PLAN OF CARE
[facility] - Emergency Dept  Initial Discharge Assessment       Primary Care Provider: Obi Perez MD    Admission Diagnosis: Seizure-like activity [R56.9]    Admission Date: 7/20/2025  Expected Discharge Date: 7/21/2025    Transition of Care Barriers: (P) Does not adhere to care plan, Mental illness, Social, Substance Abuse    Payor: [employer] CROSS BLUE SHIELD / Plan: BCBS BLUE SAVER PPO - HD / Product Type: PPO /     Extended Emergency Contact Information  Primary Emergency Contact: Fernanda Pearce  Mobile Phone: [phone]  Relation: Mother    Discharge Plan A: (P) Psychiatric hospital  Discharge Plan B: (P) Home      [facility] [id number] [place] [facility] [place] - [address] [facility]  [address] [facility] [address]  Phone: [phone] Fax: [phone]    [facility] [place] - [address]  [address]  [place] [address]  Phone: [phone] Fax: [phone]    [facility] - [place], [place] - [address]  [address]  [address]  [facility] [address]  Phone: [phone] Fax: [phone]    [id number] - [place], [place] - [address] [facility] [address]  [address]  [facility] [address]  Phone: [phone] Fax: [phone]      Initial Assessment (most recent)       Adult Discharge Assessment - 07/21/25 1715          Discharge Assessment    Assessment Type Discharge Planning Assessment (P)      Confirmed/corrected address, phone number and insurance Yes (P)      Confirmed Demographics Correct on Facesheet (P)      Source of Information patient;health record (P)      Communicated LORRIE with patient/caregiver Yes (P)      People in Home alone (P)      Prior to hospitilization cognitive status: Alert/Oriented (P)      Current cognitive status: Alert/Oriented (P)      Equipment Currently Used at Home none (P)      Readmission within 30 days? No (P)      Patient currently being followed by outpatient  case management? No (P)      Do you currently have service(s) that help you manage your care at home? No (P)      Do you take prescription medications? Yes (P)      Do you have prescription coverage? Yes (P)      Do you have any problems affording any of your prescribed medications? TBD (P)      How do you get to doctors appointments? family or friend will provide;public transportation (P)      Are you on dialysis? No (P)      Discharge Plan A Psychiatric hospital (P)      Discharge Plan B Home (P)      DME Needed Upon Discharge  none (P)      Discharge Plan discussed with: Patient (P)      Transition of Care Barriers Does not adhere to care plan;Mental illness;Social;Substance Abuse (P)         Financial Resource Strain    How hard is it for you to pay for the very basics like food, housing, medical care, and heating? Patient declined (P)         Food Insecurity    Within the past 12 months, you worried that your food would run out before you got the money to buy more. Sometimes true (P)         Stress    Do you feel stress - tense, restless, nervous, or anxious, or unable to sleep at night because your mind is troubled all the time - these days? To some extent (P)         Alcohol Use    Q1: How often do you have a drink containing alcohol? Patient declined (P)         Utilities    In the past 12 months has the electric, gas, oil, or water company threatened to shut off services in your home? No (P)         Health Literacy    How often do you need to have someone help you when you read instructions, pamphlets, or other written material from your doctor or pharmacy? Rarely (P)

## 2025-07-21 NOTE — ED NOTES
Patient identifiers for Flako Pearce 35 y.o. male checked and correct.  Chief Complaint   Patient presents with    Neck Pain     Pt arrive to ED with a complaint of neck pain,stiffness.     Past Medical History:   Diagnosis Date    Bipolar 1 disorder     PHU (generalized anxiety disorder)     Human immunodeficiency virus (HIV) disease     normal CD4 as of 12/22    Panic disorder     Seizures     Substance use disorder      Allergies reported:   Review of patient's allergies indicates:   Allergen Reactions    Clindamycin      Per report from [facility]       Appearance: Pt awake, alert & oriented to person, place & time. Pt in no acute distress at present time. Pt is unkept, malodorous, and dirty   Skin: Skin warm, dry & intact. Color consistent with ethnicity. Mucous membranes moist. No breakdown or brusing noted.   Musculoskeletal: Patient moving all extremities well, no obvious swelling or deformities noted.   Respiratory: Respirations spontaneous, even, and non-labored. Visible chest rise noted. Airway is open and patent. No accessory muscle use noted.   Neurologic: Sensation is intact. Speech is clear and appropriate. Eyes open spontaneously, behavior appropriate to situation, follows commands, facial expression symmetrical, bilateral hand grasp equal and even, purposeful motor response noted.  Cardiac: All peripheral pulses present. No Bilateral lower extremity edema. Cap refill is <3 seconds.  Abdomen: Abdomen soft, non distended, non tender to palpation.   : Pt voids independently, denies dysuria, hematuria, frequency.

## 2025-07-21 NOTE — ED NOTES
Received report from VILMA Zambrano and assumed care of patient at this time.  Patient is AAOx4 with a erratic affect and aware of environment. Airway is open and patent, respirations are spontaneous, normal effort and rate noted. Pt denies chest pain at this time. Skin warm and dry. Movement to all extremities noted. Bed placed in low position, side rails up x 2, call light is within reach of patient. Explanation of care provided to patient and is refusing all vital sign monitoring. Patient offers no complaints at this time. Awaiting further MD orders and bed assignment, POC continues.

## 2025-07-21 NOTE — ED PROVIDER NOTES
Encounter Date: 7/20/2025       History     Chief Complaint   Patient presents with    Neck Pain     Pt arrive to ED with a complaint of neck pain,stiffness.     35-year-old male, history of HIV, substance use disorders, possible seizure disorder, bipolar disorder, seen here earlier this evening after a possible seizure episode.  Patient had a mildly elevated lactate but otherwise his exam was unremarkable.  He had an extensive ED workup performed which was also unremarkable and he was given intravenous Keppra.  Patient was offered transfer to [facility] for rehab for his substance use disorder.  Patient declined this and instead requested repeatedly for a prescription for benzodiazepines.  He was ultimately discharged and according to patient when he was getting on the bus outside of [facility] he had a seizure like episode.  This was unwitnessed.  Patient presented in triage was complaining of seizures and was apparently exhibiting some bizarre motor behaviors.    The history is provided by the patient. The history is limited by the absence of a caregiver and the condition of the patient.     Review of patient's allergies indicates:   Allergen Reactions    Clindamycin      Per report from [facility]     Past Medical History:   Diagnosis Date    Bipolar 1 disorder     PHU (generalized anxiety disorder)     Human immunodeficiency virus (HIV) disease     normal CD4 as of 12/22    Panic disorder     Seizures     Substance use disorder      History reviewed. No pertinent surgical history.  No family history on file.  Social History[1]  Review of Systems    Physical Exam     Initial Vitals [07/20/25 2001]   BP Pulse Resp Temp SpO2   (!) 123/98 (!) 133 (!) 24 97.6 °F (36.4 °C) 98 %      MAP       --         Physical Exam    Nursing note and vitals reviewed.  Constitutional:   Still disheveled.    Patient intermittently exhibiting some bizarre movements of his head, hyperextension of his neck and then opening his mouth  "wide during my initial assessment.  Throughout these episodes, patient is able to answer all questions appropriately and follow commands.   HENT: Mouth/Throat: Oropharynx is clear and moist.   Eyes: Conjunctivae and EOM are normal. Pupils are equal, round, and reactive to light.   Neck: Neck supple.   Cardiovascular:  Normal rate.           Pulmonary/Chest: No respiratory distress.   Musculoskeletal:         General: No tenderness or edema. Normal range of motion.      Cervical back: Neck supple.     Neurological: He is alert and oriented to person, place, and time. He has normal strength. No cranial nerve deficit or sensory deficit. GCS score is 15. GCS eye subscore is 4. GCS verbal subscore is 5. GCS motor subscore is 6.   Skin: Skin is warm and dry.         ED Course   Procedures  Labs Reviewed   DRUG SCREEN PANEL, URINE EMERGENCY - Abnormal       Result Value    Benzodiazepine, Urine Presumptive Positive (*)     Methadone, Urine Negative      Cocaine, Urine Negative      Opiates, Urine Negative      Barbiturates, Urine Negative      Amphetamines, Urine Presumptive Positive (*)     THC Presumptive Positive (*)     Phencyclidine, Urine Negative      Urine Creatinine 112.0      Narrative:     This screen includes the following classes of drugs at the listed cut-off:     Benzodiazepines:        200 ng/ml   Methadone:              300 ng/ml   Cocaine metabolite:     300 ng/ml   Opiates:                300 ng/ml   Barbiturates:           200 ng/ml   Amphetamines:           1000 ng/ml   Marijuana metabs (THC): 50 ng/ml   Phencyclidine (PCP):    25 ng/ml     This is a screening test. If results do not correlate with clinical presentation, then a confirmatory send out test is advised.    This report is intended for use in clinical monitoring and management of patients. It is not intended for use in employment related drug testing."   T-HELPER CELLS (CD4) COUNT - Abnormal    CD4 % 27.97 (*)     CD4 Absolute 708      " Disclaimer Statement        Value: This test was developed and its performance characteristics determined by the [facility] Department of Pathology and Laboratory Medicine. It has not been cleared or approved by the U.S. Food and Drug Administration. The FDA has determined that such clearance or approval is not necessary. This test is used for clinical purposes. It should not be regarded as investigational or for research. This laboratory is certified under CLIA-88 as qualified to perform high complexity clinical laboratory testing.      COMPREHENSIVE METABOLIC PANEL - Abnormal    Sodium 139      Potassium 4.8      Chloride 107      CO2 18 (*)     Glucose 76      BUN 11      Creatinine 0.9      Calcium 9.4      Protein Total 7.3      Albumin 4.2      Bilirubin Total 0.5      ALP 78      AST 28      ALT 18      Anion Gap 14      eGFR >60     MAGNESIUM - Normal    Magnesium  1.9     PHOSPHORUS - Normal    Phosphorus Level 3.6     CBC WITH DIFFERENTIAL - Normal    WBC 11.22      RBC 4.78      HGB 14.5      HCT 42.8      MCV 90      MCH 30.3      MCHC 33.9      RDW 13.8      Platelet Count 296      MPV 10.0      Nucleated RBC 0      Neut % 65.2      Lymph % 27.3      Mono % 6.3      Eos % 0.5      Basophil % 0.4      Imm Grans % 0.3      Neut # 7.31      Lymph # 3.06      Mono # 0.71      Eos # 0.06      Baso # 0.05      Imm Grans # 0.03     CBC W/ AUTO DIFFERENTIAL    Narrative:     The following orders were created for panel order CBC with Automated Differential.  Procedure                               Abnormality         Status                     ---------                               -----------         ------                     CBC with Differential[[id number]]       Normal              Final result                 Please view results for these tests on the individual orders.   HIV RNA, QUANTITATIVE, PCR   POCT GLUCOSE MONITORING CONTINUOUS          Imaging Results              CT Head Without  Contrast (Final result)  Result time 07/21/25 00:57:13      Final result by David Garcia MD (07/21/25 00:57:13)                   Impression:      No CT evidence of acute intracranial abnormality, allowing for motion and positional limitations.    Paranasal sinus disease.      Electronically signed by: David Garcia MD  Date:    07/21/2025  Time:    00:57               Narrative:    EXAMINATION:  CT HEAD WITHOUT CONTRAST    CLINICAL HISTORY:  Seizures;    TECHNIQUE:  Low dose axial images were obtained through the head.  Coronal and sagittal reformations were also performed. Contrast was not administered.    COMPARISON:  None.    FINDINGS:  Exam quality is limited by motion and suboptimal positioning.    No evidence of acute territorial infarct, hemorrhage, mass effect, or midline shift.    Ventricles are normal in size and configuration.    No displaced calvarial fracture.    Mucosal thickening in the paranasal sinuses.  Mucosal retention cyst in the sphenoid sinus and left maxillary sinus.                                       Medications   sodium chloride 0.9% flush 10 mL (has no administration in time range)   melatonin tablet 6 mg (has no administration in time range)   butalbital-acetaminophen-caffeine -40 mg per tablet 1 tablet (has no administration in time range)   sodium chloride 0.9% flush 10 mL (has no administration in time range)   naloxone 0.4 mg/mL injection 0.02 mg (has no administration in time range)   glucose chewable tablet 16 g (has no administration in time range)   glucose chewable tablet 24 g (has no administration in time range)   dextrose 50% injection 12.5 g (has no administration in time range)   dextrose 50% injection 25 g (has no administration in time range)   glucagon (human recombinant) injection 1 mg (has no administration in time range)   enoxaparin injection 40 mg (40 mg Subcutaneous Not Given 7/20/25 4357)   lactated ringers infusion (0 mL/hr Intravenous Stopped  7/21/25 0715)   levETIRAcetam tablet 500 mg (0 mg Oral Hold 7/21/25 1045)     Medical Decision Making  Bounce back from earlier this afternoon, patient reports another seizure like event while getting on the bus.  There are no witnesses here to corroborate this.  From triage, patient apparently making weird movements with his head and neck and in his unclear whether these were actual seizures.    On my assessment in the room, patient with normal vital signs and he is times extending his head and neck an opening in his mouth really wide.  These movements do not look consistent with seizure-like activity and he is able to answer questions throughout the entirety of these episodes.    Differential diagnosis at this point includes seizure, dystonic reaction, conversion disorder    Plan  -admit for observation    Amount and/or Complexity of Data Reviewed  External Data Reviewed: labs, radiology, ECG and notes.  Labs: ordered. Decision-making details documented in ED Course.    Risk  OTC drugs.  Prescription drug management.  Decision regarding hospitalization.                                          Clinical Impression:  Final diagnoses:  [R56.9] Seizure-like activity (Primary)          ED Disposition Condition    Observation                       [1]   Social History  Tobacco Use    Smoking status: Some Days     Types: Cigarettes    Smokeless tobacco: Never   Vaping Use    Vaping status: Never Used   Substance Use Topics    Alcohol use: Yes     Comment: occasionally     Drug use: Yes     Frequency: 4.0 times per week     Types: Amphetamines, Marijuana        Emeli Kline MD  07/21/25 5580

## 2025-07-21 NOTE — CONSULTS
"  [facility]   DEPARTMENT OF PSYCHIATRY     IDENTIFIERS & DEMOGRAPHICS:     ENCOUNTER: initial    -- PATIENT IDENTIFIERS: Flako Pearce  [id number]  1990  35 y.o.  male  -- LOCATION OF PATIENT: unit (psych)    -- MODE OF ARRIVAL: self-presented  -- PRESENT WITH PATIENT DURING SESSION: ALONE  -- SOURCES OF INFORMATION: PATIENT  -- ENCOUNTER PROVIDER: Jose Perry MD        PRESENTATION:   History of Present Illness   **  CHIEF COMPLAINT(S): "seizures"    OVERVIEW OF THE HPI:    Per primary:  The patient is a 35-year-old male with a medical history significant for bipolar disorder, HIV, seizure disorder, and active substance use disorder (ongoing crystal meth use), who was brought to the ED by EMS after a reported seizure episode. The patient states he experienced seizures at home lasting approximately three hours. He lives alone and reports that during a break between seizure episodes, he was able to call EMS, anticipating that another seizure was imminent. While en route to the hospital, he reportedly experienced another seizure and received 10 mg IM Versed from EMS. He has a recent outpatient neurology evaluation at [place], where he reported a history of multiple failed antiepileptic drug trials. He expressed a preference to remain on clonazepam 2 mg daily as monotherapy for seizure control. The patient has not followed up for this workup and has not initiated any new AEDs.  In the ED, the patient was hemodynamically stable and had unremarkable lab results. He was evaluated, received IV keppra and offered referral to [facility] for inpatient substance use rehabilitation, which he declined. He instead repeatedly requested a prescription for benzodiazepines.    Following discharge, the patient later claimed to have experienced another seizure-like episode while waiting for a bus outside the hospital. This event was unwitnessed but reportedly included unusual motor behavior. Given " his ongoing symptoms, he was admitted to the hospitalist service for further evaluation and management.       SUBJECTIVE/CURRENT FINDINGS:    Patient seen at bedside, lying facing wall never looked at provider. Fully oriented, flat affect. Endorses he is here due to seizures, reports he has been taking klonipin daily for years and should be on it now. Denies any SI, depressed mood, AH, VH. Denies hx of alcohol use, endorses weekly marijuana use, endorses weekly amphetamine use. States he takes it as a pill, denies IVDU. Does not believe he has a problem, does not want any help staying away or quitting. Does not think he needs to see a psychiatrist or has any questions for us. Again denied any concern or stimulant use.     REVIEW OF SYSTEMS:    GLOBAL SUBJECTIVE RATING: Minimally responisve     Y   Sleep Disturbance/Disruption   N   Appetite/Weight Change   N   Alterations in Energy Level   N   Impaired Focus/Concentration   N   Depressive Symptomatology   Y   Excessive Anxiety/Worry   Y   Dysregulated Mood/Behavior   N   Manic Symptomatology   N   Psychosis    Regarding the current presentation, no other significant issues or complaints are voiced or known at this time.      ADD-ON PSYCHOTHERAPY:     N/A         HISTORY:   Patient Information   **    PSYCH  SUBSTANCE  FAMILY  SOCIAL  MEDICAL      Previous/Pre-Existing Psychiatric Diagnoses  Stimulant Use Disorder, SIPD, Bipolar disorder ?    Past Psychotropic Trials  subutex, valium, clonazepam, ativan, remeron, vistaril, haldol, zyprexa, lurasidone, geodon, lamictal, seroquel, wellbutrin, Haldol D, Haldol   N   Current Psychiatric Provider   Y   Hx of Outpatient Psychiatric Treatment   Y   Hx of Psychiatric Hospitalization   Y   Hx of Suicidal Ideation/Threats   Y   Hx of Suicide Attempts/Gestures  cut wrist 2017   N   Hx of Homicidal Ideation/Threats   N   Hx of Homicidal Behavior   Y   Hx  of Non-Suicidal Self-Injurious Behavior   N   Hx of Perpetrated Violence   Y   Documented Hx of Malingering   Y   Hx of Psychosis   U   Hx of Bipolar Diathesis   U   Hx of Depression   N   Hx of Anxiety   N   Hx of Insomnia   N   Hx of Delirium     Y   Hx of Formal JAMEL Treatment   N   Recent Alcohol Consumption   N   Hx of Nicotine Use   N   Hx of Alcohol Misuse/Abuse   Y   Hx of Illicit Drug Misuse/Abuse   U   Hx of Prescription Drug Misuse/Abuse   +   Drug Experimentation/Usage  +cannabis, +methamphetamine       U   Family Psychiatric History      U   Hx of Trauma   U   Hx of Abuse     N   Developmental Delay/Disability   Y   GED/High School Diploma   Y   Post-Secondary Education   N   Currently Employed   U   Currently on Disability   U   Financially Stable   Y   Functions Independently   Y   Domiciled   U   Intact Support System   Y   Heterosexual/Cisgender   N   Currently in a Relationship     N   Ever Charged/Convicted   N   Current Probation/[place]/Diversion   N   Hx of Incarceration     U   Hx of Seizures   U   Hx of Head Trauma    >> EHR (EPIC): reviewed/reconciled      The patient's past medical history has been reviewed and updated as appropriate within the electronic health record system.  See PROBLEM LIST & HISTORY for details.    Scheduled and PRN Medications: The electronic chart was reviewed and updated as appropriate.  See MEDCARD for details.    Allergies:  Clindamycin      EXAMINATION:   Objective   **  VITALS:  /74   Pulse 68   Temp 97.6 °F (36.4 °C) (Axillary)   Resp 12   SpO2 95%       MENTAL STATUS EXAMINATION:  Appearance: inadequately groomed, appears stated age, normal weight  in no apparent distress, well-appearing    disheveled  Behavior & Attitude: detached, uncooperative, participative, under good behavioral control, able to redirect,  appropriate eye contact  calm, agreeable    Movements & Motor Activity: no psychomotor agitation, no psychomotor retardation, normal gait, normal station, ambulates without assistance, not wheelchair bound (able to ambulate), no weakness, no spasticity, no rigidity, no tics, no tremor, no akathisia, no dyskinesia, no ataxia, no parkinsonism    Speech & Language: normal rate, normal volume, normal quantity, normal latency, spontaneous, fluent  non-reciprocal    Mood: fine/good  Affect: subdued, flat  appropriate given the situation/context, mood congruent    Thought Process & Associations: linear, goal-directed, organized, logical, coherent, relevant, abstract  no loosening of associations    Thought Content & Perceptions: no delusions, no paranoid ideation, no ideas of reference, no grandiosity, no hyperreligiosity, no hallucinations, no responding to internal stimuli    Sensorium: awake, alert, clear  no confusion, no delirium    Orientation: grossly intact, oriented to person, oriented to place, oriented to time, oriented to situation    Recent & Remote Memory: intact (recent), intact (remote)    Attention & Concentration: intact  attentive to conversation, not easily distracted    Fund of Knowledge: intact, vocabulary proficient    Insight: intact, good  demonstrates sufficient awareness of condition/situation    Judgment: intact, good  heeds instructions/advice        RISK & REGULATORY:   Impression   **   RISK PARAMETERS:     N   Suicidal Ideation/Threats   N   Suicide Attempts/Gestures   N   Homicidal Ideation/Threats   N   Homicidal Behavior   N   Non-Suicidal Self-Injurious Behavior   N   Perpetrated Violence     NOTE: RISK PARAMETERS are current to the encounter/episode  NOT inclusive of past history.       FIREARMS & WEAPONS:     N   Ready Access to Firearms   +   Further Considerations  denied     REGULATORY:      INFORMED CONSENT & SHARED DECISION  MAKING are the hallmark and bedrock of good clinical care, and as such have been employed and obtained, respectively, to the degree possible.  Discussed, to the extent possible, diagnosis, risks and benefits of proposed treatment (e.g., medication, therapy) vs alternative treatments vs no treatment, potential side effects of these treatments, and the inherent unpredictability of treatment.        WARNINGS & PRECAUTIONS:  >> In cases of emergencies (e.g. SI/HI resulting in danger to self or others, functioning deteriorating to the level of grave disability), call 911 or [phone], or present to the emergency department for immediate assistance.    >> Individuals should not operate a motor vehicle or heavy machinery if effects of medications or underlying symptoms/condition impair the ability to do so safely.    >> FULLY comply with ANY/ALL medication as prescribed/instructed and report ANY/ALL suspected adverse effects to appropriate health care providers.      ASSESSMENT & PLAN:   Assessment & Plan   **  DIAGNOSES & PROBLEMS:     Stimulant Use Disorder    -- ASSESSMENT (synthesis  analysis):     The patient is a 35-year-old male with a medical history significant for substance use disorder (ongoing crystal meth use), SIPD, and seizure like activity who was brought to the ED by EMS after a reported seizure episode. Psychiatry was consulted for JAMEL, pt denies any interest in treatment and does not believe he needs to see psychiatry.    -- PLAN (goals  recommentations):          - CURRENT CONDITION: exacerbated  as compared to typical baseline  >> attended to therapeutic alliance, via the building and maintenance of rapport and trust  >> risk mitigation strategies and safety netting were employed, explored, and reinforced  >> follow with primary care provider for routine health maintenance and management of medical co-morbidities, as well as any indicated/needed specialists  -- DISCHARGE ###  >> patient should contact  their current outpatient provider expeditiously after discharge to apprise them of the situation and receive further instructions  -- REFERRAL ###  >> contingent on accessibility and willingness, patient would benefit from the following referrals/services: addiction rehab program and mutual self-help groups (e.g. Alcoholics Anonymous, [employer])  > ADDICTION ###  >> advised to abstain from alcohol and illicit drug use  >> counseled on full abstinence from alcohol and substances of abuse (illicit and prescription), as well as maintenance of a recovery lifestyle  >> harm reduction techniques discussed, as warranted, to mitigate risk from problematic behaviors  >> serial laboratory testing (e.g. PETH, serum ethanol, urine toxicology) recommended and/or planned to provide accountability, as well as guide and refine treatment moving forward  >> importance of lifelong, active engagement in 12 step (or equivalent) mutual self-help program(s) was stressed/reinforced, including regular meeting attendance and acquisition/maintenance of a sponsor  >> education and/or resources discussed and/or provided for various addiction recovery and rehabilitation options  >> motivational interviewing applied, relapse prevention provided  -- ATTEST ###  >> case discussed with staff psychiatrist  >> will sign off at this time, thank you    See below for pertinent laboratory and radiographic findings.      CHART REVIEW: available documentation has been reviewed, and pertinent elements of the chart have been incorporated into this evaluation where appropriate.       KEY & LINKS:        Y  = yes/endorses     N  = no/denies     U  = unknown/unable to assess    ADHD   AIMS   AUDIT   AUDIT-C   C-SSRS (Screen)   C-SSRS (Short)   C-SSRS (Full)   DAST   DAST-10   PHU-7   MoCA   PCL-5   PHQ-9   JAMEL   YMRS      PSYCHIATRIC SCREENINGS:       DIAGNOSTIC TESTING:   Results   **   Glu 76  7/21/2025  Li *   *  TSH 0.906  4/2/2025     HgA1c 5.5  11/22/2024  VPA <12.5 (L)  12/10/2023   FT4 0.98  7/14/2023    Na 139  7/21/2025  CLZ *   *  WBC 11.22  7/21/2025    Cr 0.9  7/21/2025  ANC 7.5; 83.2;  (H)  9/9/2024   Hgb 14.5  7/21/2025     BUN 11  7/21/2025  Trop I <0.006  12/9/2023  HCT 42.8  7/21/2025     GFR >60  7/21/2025   CPK 72  12/9/2023    7/21/2025     Alb 4.2  7/21/2025   PRL 13.0  7/20/2025  B12 *   *     T Bili 0.5  7/21/2025  Chol 177  11/22/2024  B9 *   *    ALP 78  7/21/2025  TGs 233 (H)  11/22/2024  B1 *   *    AST 28  7/21/2025  HDL 36 (L)  11/22/2024  Vit D *   *     ALT 18  7/21/2025  LDL 94  11/22/2024  HIV Reactive (A)  7/14/2023     INR *   *  [place] *   *   Hep C Non-Reactive  4/2/2025    GGT *   *  Lip 31  7/14/2023  RPR Reactive (A)  7/14/2023    MCV 90  7/21/2025   NH4 20  7/14/2023  UPT *   *      PETH *   *  THC Presumptive Positive (A)  7/21/2025    ETOH <10  9/9/2024  CHAPITO Negative  7/21/2025    EtG *   *  AMP Presumptive Positive (A)  7/21/2025    ALC *   *  OPI Negative  7/21/2025    BZO Presumptive Positive (A)  8/20/2024  MTD Negative  8/20/2024     BAR Negative  7/21/2025  BUP *   *    PCP Negative  7/21/2025  FEN *   *     Results for orders placed or performed in visit on 07/20/25   EKG 12-lead    Collection Time: 07/20/25  8:08 PM   Result Value Ref Range    QRS Duration 100 ms    [facility] QTC Calculation 403 ms    Narrative    Test Reason :    Vent. Rate :  65 BPM     Atrial Rate :  65 BPM     P-R Int : 116 ms          QRS Dur : 100 ms      QT Int : 388 ms       P-R-T Axes :  40  85  71 degrees    QTcB Int : 403 ms    Normal sinus rhythm  Normal ECG  When compared with ECG of 24-Jun-2025 11:22,  No significant change was found  Confirmed by Evan Goldberg ([id number]) on 7/21/2025 12:12:40 PM    Referred By: AAAREFERRAL SELF           Confirmed By: Evan Goldberg         Inpatient consult to Psychiatry  Consult performed by: Jose Perry MD  Consult ordered by: Celestino,  Shanon REICH MD        [facility] EMERGENCY DEPARTMENT

## 2025-07-21 NOTE — ED NOTES
"EEG tech at bedside. Pt writhing in bed. Pt educated that he has to remain still for the EEG. Pt stated "okay" and continued writing and shaking head. Pt now spitting and scratching himself. EEG tech unable to attach EEG monitor.  "

## 2025-07-21 NOTE — ED NOTES
Resumed care from VILMA Harley. Patient resting comfortably. Side rails up. Seizure precautions in place. Patient removed all monitoring equipment and pulled out IV. MD notified.

## 2025-07-21 NOTE — ED TRIAGE NOTES
Report received from VILMA Hernandez. Assume care of pt. Pt resting comfortably and independently repositioned in stretcher with bed locked in lowest position for safety. NAD noted at this time. Respirations even and unlabored and visible chest rise noted.  Patient offered bathroom assistance and denies need at this time. Pt instructed to call if assistance is needed. Pt on continuous cardiac, BP, and O2 monitoring. Call light within reach. No needs at this time. Will continue to monitor.

## 2025-07-21 NOTE — ASSESSMENT & PLAN NOTE
"-Unwitnessed, "seizure-like" episode with unusual motor behavior, raising concern for possible drug-induced events.  -CT head pending  -Continue Keppra 500 mg b.i.d.  -Psychiatry consulted  -Monitor in inpatient setting for witnessed seizure activity    "

## 2025-07-21 NOTE — DISCHARGE SUMMARY
[facility] Emergency Dept  Hospital Medicine  Discharge Summary      Patient Name: Flako Pearce  MRN: [id number]  KARLA: [id number]  Patient Class: OP- Observation  Admission Date: 7/20/2025  Hospital Length of Stay: 0 days  Discharge Date and Time: 07/21/2025 5:19 PM  Attending Physician: Roxie Wilson MD   Discharging Provider: Roxie Wilson MD  Primary Care Provider: Obi Perez MD  Hospital Medicine Team: [facility] HOSP MED I Roxie Wilson MD  Primary Care Team: [facility] MED I    HPI:   The patient is a 35-year-old male with a medical history significant for bipolar disorder, HIV, seizure disorder, and active substance use disorder (ongoing crystal meth use), who was brought to the ED by EMS after a reported seizure episode. The patient states he experienced seizures at home lasting approximately three hours. He lives alone and reports that during a break between seizure episodes, he was able to call EMS, anticipating that another seizure was imminent. While en route to the hospital, he reportedly experienced another seizure and received 10 mg IM Versed from EMS. He has a recent outpatient neurology evaluation at [place], where he reported a history of multiple failed antiepileptic drug trials. He expressed a preference to remain on clonazepam 2 mg daily as monotherapy for seizure control. The patient has not followed up for this workup and has not initiated any new AEDs.  In the ED, the patient was hemodynamically stable and had unremarkable lab results. He was evaluated, received IV keppra and offered referral to [facility] for inpatient substance use rehabilitation, which he declined. He instead repeatedly requested a prescription for benzodiazepines.    Following discharge, the patient later claimed to have experienced another seizure-like episode while waiting for a bus outside the hospital. This event was unwitnessed but reportedly included unusual motor behavior. Given  "his ongoing symptoms, he was admitted to the hospitalist service for further evaluation and management.        * No surgery found *      Hospital Course:   CTH completed- no abnormalities. HIV RNA undetectable. CD4 count w/in 700s. Patient continued to ask for klonopin, and when denied, he started to have eyes rollback while he continued to speak to provider, and then he would arch his back up in the air. He would then speak to me, and then state that he keeps having "seizures." Patient declined EEG. Psych reviewed- no acute psychosis at this time, and they agreed with holding klonopin. Patient was instructed to continue with keppra alone, and follow-up with outpatient neurologist. UDS +iv for benzos, amphetamines, and marijuana. Pt deemed appropriate for discharge. Plan discussed with pt, who was agreeable and amenable; medications were discussed and reviewed, outpatient follow-up scheduled, ER precautions were given, all questions were answered to the pt's satisfaction, and Luke was subsequently discharged.     Physical Exam  Gen: in NAD, appears stated age, appears disheveled   Neuro: AAOx3, motor, sensory, and strength grossly intact BL  HEENT: NTNC, EOMI, PERRL, MMM  CVS: RRR, no m/r/g; S1/S2 auscultated with no S3 or S4; capillary refill < 2 sec  Resp: lungs CTAB, no w/r/r; no belabored breathing or accessory muscle use appreciated   Abd: BS+ in all 4 quadrants; NTND, soft to palpation; no organomegaly appreciated   Extrem: pulses full, equal, and regular over all 4 extremities; no UE or LE edema BL  Skin: scattered abrasions of upper and lower ext + face, dirt appreciated on soles of feet         Goals of Care Treatment Preferences:  Code Status: Full Code         Consults:   Consults (From admission, onward)          Status Ordering Provider     Inpatient consult to Psychiatry  Once        Provider:  (Not yet assigned)    Completed GABRIEL MICHAEL            Assessment & Plan  Seizure  -Unwitnessed, " ""seizure-like" episode with unusual motor behavior, raising concern for possible drug-induced events.  -CT head pending  -Continue Keppra 500 mg b.i.d.  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] 10/22/2018 07/21/2025 Yes       Discharged Condition: fair    Disposition: Home or Self Care    Follow Up:    Patient Instructions:      Ambulatory referral/consult to Neurology   Standing Status: Future   Referral Priority: Routine Referral Type: Consultation   Referral Reason: Specialty Services Required   Requested Specialty: Neurology   Number of Visits Requested: 1     Diet Adult Regular     Notify your health care provider if you experience any of the following:   Order Comments: New seizure-like activity     Notify your health care provider if you experience any of the following:  difficulty breathing or increased cough     Activity as tolerated       Significant Diagnostic Studies: Labs: BMP:   Recent Labs   Lab 07/20/25  1545 07/21/25  0407   GLU 99 76    139   K 4.2 4.8    107   CO2 20* 18*   BUN 11 11   CREATININE 0.9 0.9   CALCIUM 9.1 9.4   MG 2.1 1.9   , CMP   Recent Labs   Lab 07/20/25  1545 07/21/25  0407    139   K 4.2 4.8    107   CO2 20* 18*   GLU 99 76   BUN 11 11   CREATININE 0.9 0.9   CALCIUM 9.1 9.4   PROT 7.0 7.3   ALBUMIN 4.1 4.2   BILITOT 0.4 0.5   ALKPHOS 76 78   AST 18 28   ALT 17 18   ANIONGAP 11 14   , and CBC   Recent Labs   Lab 07/20/25  1545 07/21/25  0407   WBC 5.61 11.22   HGB 14.0 14.5   HCT " "41.0 42.8    296     Microbiology: Urine Culture  No results found for: "LABURIN"    Pending Diagnostic Studies:       None           Medications:  Reconciled Home Medications:      Medication List        CONTINUE taking these medications      butalbital-acetaminophen-caff -40 mg Cap  Take 1 capsule by mouth every 4 (four) hours as needed (pain).     levETIRAcetam 500 MG Tab  Commonly known as: KEPPRA  Take 1 tablet (500 mg total) by mouth 2 (two) times daily.     SYMTUZA ORAL  Take by mouth.            STOP taking these medications      clonazePAM 2 MG Tab  Commonly known as: KlonoPIN              Indwelling Lines/Drains at time of discharge:   Lines/Drains/Airways       None                       Time spent on the discharge of patient: 35 minutes         Roxie Wilson MD  Department of Hospital Medicine  [facility] - Emergency Dept  "

## 2025-07-21 NOTE — PLAN OF CARE
Discharge Planning Assessment:    Patient admitted on: 7-20-25  Chart reviewed today  Care plan discussed with ER treatment team   attending Dr Wilson    Current Dispo: home today  Discussed addiction resources, patient refused.  See PCP in 1 week  Case management to follow as needed

## 2025-07-21 NOTE — H&P
[facility]  [facility] & Physical    Patient Name: Flako Pearce  MRN: [id number]  Patient Class: OP- Observation  Admission Date: 7/20/2025  Attending Physician: Roxie Wilson MD   Primary Care Provider: Obi Perez MD         Patient information was obtained from patient and ER records.     Subjective:     Principal Problem:Seizure    Chief Complaint:   Chief Complaint   Patient presents with    Neck Pain     Pt arrive to ED with a complaint of neck pain,stiffness.        HPI: The patient is a 35-year-old male with a medical history significant for bipolar disorder, HIV, seizure disorder, and active substance use disorder (ongoing crystal meth use), who was brought to the ED by EMS after a reported seizure episode. The patient states he experienced seizures at home lasting approximately three hours. He lives alone and reports that during a break between seizure episodes, he was able to call EMS, anticipating that another seizure was imminent. While en route to the hospital, he reportedly experienced another seizure and received 10 mg IM Versed from EMS. He has a recent outpatient neurology evaluation at [place], where he reported a history of multiple failed antiepileptic drug trials. He expressed a preference to remain on clonazepam 2 mg daily as monotherapy for seizure control. The patient has not followed up for this workup and has not initiated any new AEDs.  In the ED, the patient was hemodynamically stable and had unremarkable lab results. He was evaluated, received IV keppra and offered referral to [facility] for inpatient substance use rehabilitation, which he declined. He instead repeatedly requested a prescription for benzodiazepines.    Following discharge, the patient later claimed to have experienced another seizure-like episode while waiting for a bus outside the hospital. This event was unwitnessed but reportedly included unusual  motor behavior. Given his ongoing symptoms, he was admitted to the hospitalist service for further evaluation and management.        Past Medical History:   Diagnosis Date    Bipolar 1 disorder     PHU (generalized anxiety disorder)     Human immunodeficiency virus (HIV) disease     normal CD4 as of 12/22    Panic disorder     Seizures     Substance use disorder        History reviewed. No pertinent surgical history.    Review of patient's allergies indicates:   Allergen Reactions    Clindamycin      Per report from [facility]       Current Facility-Administered Medications on File Prior to Encounter   Medication    [COMPLETED] levETIRAcetam injection 1,500 mg    [COMPLETED] sodium chloride 0.9% bolus 1,000 mL 1,000 mL    [COMPLETED] sodium chloride 0.9% bolus 2,040 mL 2,040 mL     Current Outpatient Medications on File Prior to Encounter   Medication Sig    butalbital-acetaminophen-caff -40 mg Cap Take 1 capsule by mouth every 4 (four) hours as needed (pain).    clonazePAM (KLONOPIN) 2 MG Tab Take 1 tablet (2 mg total) by mouth once daily.    darunavir/cob/emtri/tenof alaf (SYMTUZA ORAL) Take by mouth.    levETIRAcetam (KEPPRA) 500 MG Tab Take 1 tablet (500 mg total) by mouth 2 (two) times daily.    [DISCONTINUED] levETIRAcetam (KEPPRA) 500 MG Tab Take 1 tablet (500 mg total) by mouth 2 (two) times daily.     Family History    None       Tobacco Use    Smoking status: Some Days     Types: Cigarettes    Smokeless tobacco: Never   Vaping Use    Vaping status: Never Used   Substance and Sexual Activity    Alcohol use: Yes     Comment: occasionally     Drug use: Yes     Frequency: 4.0 times per week     Types: Amphetamines, Marijuana    Sexual activity: Not on file     Review of Systems   Neurological:  Positive for seizures.   Psychiatric/Behavioral:  Positive for behavioral problems. The patient is nervous/anxious.      Objective:     Vital Signs (Most Recent):  Temp: 97.6 °F (36.4 °C) (07/20/25 2001)  Pulse: (!)  "45 (07/20/25 2244)  Resp: 15 (07/20/25 2244)  BP: (!) 123/98 (07/20/25 2001)  SpO2: 99 % (07/20/25 2244) Vital Signs (24h Range):  Temp:  [97.6 °F (36.4 °C)-97.7 °F (36.5 °C)] 97.6 °F (36.4 °C)  Pulse:  [] 45  Resp:  [13-24] 15  SpO2:  [97 %-100 %] 99 %  BP: ()/(56-98) 123/98        There is no height or weight on file to calculate BMI.     Physical Exam  Constitutional:       General: He is not in acute distress.     Appearance: He is not ill-appearing.   Cardiovascular:      Rate and Rhythm: Normal rate and regular rhythm.      Pulses: Normal pulses.      Heart sounds: Normal heart sounds.   Pulmonary:      Effort: Pulmonary effort is normal.      Breath sounds: Normal breath sounds.   Abdominal:      General: Bowel sounds are normal.      Palpations: Abdomen is soft.   Musculoskeletal:      Right lower leg: No edema.      Left lower leg: No edema.   Skin:     Comments: abrasions and bruising in his body in different stages of healing.    Neurological:      Mental Status: He is oriented to person, place, and time.      Comments: Exhibiting hyperextension of the neck but responding appropriately to questions                Significant Labs: All pertinent labs within the past 24 hours have been reviewed.    Significant Imaging: I have reviewed all pertinent imaging results/findings within the past 24 hours.  I have reviewed and interpreted all pertinent imaging results/findings within the past 24 hours.  Assessment/Plan:     Assessment & Plan  Seizure  -Unwitnessed, "seizure-like" episode with unusual motor behavior, raising concern for possible drug-induced events.  -CT head pending  -Continue Keppra 500 mg b.i.d.  -Psychiatry consulted  -Monitor in inpatient setting for witnessed seizure activity    Substance-induced psychotic disorder  -Ongoing crystal meth use  -Urine drug screen ordered  -Inpatient psych ordered  HIV disease  CD4 count and viral load ordered  Continue Symtuza    VTE Risk Mitigation " (From admission, onward)           Ordered     enoxaparin injection 40 mg  Daily         07/20/25 2242     IP VTE LOW RISK PATIENT  Once         07/20/25 2242     Place sequential compression device  Until discontinued         07/20/25 2242     Place sequential compression device  Until discontinued         07/20/25 2242                                   On 07/20/2025, patient should be placed in hospital observation services under my care.             Shanon Tirado MD  Department of Hospital Medicine  [facility]

## 2025-07-21 NOTE — HPI
The patient is a 35-year-old male with a medical history significant for bipolar disorder, HIV, seizure disorder, and active substance use disorder (ongoing crystal meth use), who was brought to the ED by EMS after a reported seizure episode. The patient states he experienced seizures at home lasting approximately three hours. He lives alone and reports that during a break between seizure episodes, he was able to call EMS, anticipating that another seizure was imminent. While en route to the hospital, he reportedly experienced another seizure and received 10 mg IM Versed from EMS. He has a recent outpatient neurology evaluation at [place], where he reported a history of multiple failed antiepileptic drug trials. He expressed a preference to remain on clonazepam 2 mg daily as monotherapy for seizure control. The patient has not followed up for this workup and has not initiated any new AEDs.  In the ED, the patient was hemodynamically stable and had unremarkable lab results. He was evaluated, received IV keppra and offered referral to [facility] for inpatient substance use rehabilitation, which he declined. He instead repeatedly requested a prescription for benzodiazepines.    Following discharge, the patient later claimed to have experienced another seizure-like episode while waiting for a bus outside the hospital. This event was unwitnessed but reportedly included unusual motor behavior. Given his ongoing symptoms, he was admitted to the hospitalist service for further evaluation and management.

## 2025-07-23 ENCOUNTER — PATIENT OUTREACH (OUTPATIENT)
Dept: ADMINISTRATIVE | Facility: CLINIC | Age: 35
End: 2025-07-23
Payer: COMMERCIAL

## 2025-07-23 LAB — W LEVETIRACETAM: <1 UG/ML

## 2025-08-15 ENCOUNTER — HOSPITAL ENCOUNTER (EMERGENCY)
Facility: HOSPITAL | Age: 35
Discharge: HOME | End: 2025-08-15
Attending: EMERGENCY MEDICINE
Payer: COMMERCIAL

## 2025-08-15 VITALS
HEART RATE: 81 BPM | TEMPERATURE: 99 F | WEIGHT: 160 LBS | DIASTOLIC BLOOD PRESSURE: 92 MMHG | HEIGHT: 73 IN | RESPIRATION RATE: 18 BRPM | SYSTOLIC BLOOD PRESSURE: 136 MMHG | BODY MASS INDEX: 21.2 KG/M2 | OXYGEN SATURATION: 100 %

## 2025-08-15 DIAGNOSIS — F41.9 ANXIETY: Primary | ICD-10-CM

## 2025-08-15 DIAGNOSIS — R19.7 DIARRHEA, UNSPECIFIED TYPE: ICD-10-CM

## 2025-08-15 LAB
ABSOLUTE EOSINOPHIL (OHS): 0.05 K/UL
ABSOLUTE MONOCYTE (OHS): 0.44 K/UL (ref 0.3–1)
ABSOLUTE NEUTROPHIL COUNT (OHS): 6.97 K/UL (ref 1.8–7.7)
ALBUMIN SERPL BCP-MCNC: 5 G/DL (ref 3.5–5.2)
ALP SERPL-CCNC: 98 UNIT/L (ref 40–150)
ALT SERPL W/O P-5'-P-CCNC: 48 UNIT/L (ref 0–55)
ANION GAP (OHS): 11 MMOL/L (ref 8–16)
AST SERPL-CCNC: 40 UNIT/L (ref 0–50)
BASOPHILS # BLD AUTO: 0.03 K/UL
BASOPHILS NFR BLD AUTO: 0.3 %
BILIRUB SERPL-MCNC: 0.2 MG/DL (ref 0.1–1)
BUN SERPL-MCNC: 15 MG/DL (ref 6–20)
CALCIUM SERPL-MCNC: 9.9 MG/DL (ref 8.7–10.5)
CHLORIDE SERPL-SCNC: 100 MMOL/L (ref 95–110)
CO2 SERPL-SCNC: 24 MMOL/L (ref 23–29)
CREAT SERPL-MCNC: 1 MG/DL (ref 0.5–1.4)
ERYTHROCYTE [DISTWIDTH] IN BLOOD BY AUTOMATED COUNT: 13.3 % (ref 11.5–14.5)
GFR SERPLBLD CREATININE-BSD FMLA CKD-EPI: >60 ML/MIN/1.73/M2
GLUCOSE SERPL-MCNC: 92 MG/DL (ref 70–110)
HCT VFR BLD AUTO: 44.1 % (ref 40–54)
HGB BLD-MCNC: 15.3 GM/DL (ref 14–18)
IMM GRANULOCYTES # BLD AUTO: 0.03 K/UL (ref 0–0.04)
IMM GRANULOCYTES NFR BLD AUTO: 0.3 % (ref 0–0.5)
LYMPHOCYTES # BLD AUTO: 1.45 K/UL (ref 1–4.8)
MAGNESIUM SERPL-MCNC: 2.1 MG/DL (ref 1.6–2.6)
MCH RBC QN AUTO: 30.8 PG (ref 27–31)
MCHC RBC AUTO-ENTMCNC: 34.7 G/DL (ref 32–36)
MCV RBC AUTO: 89 FL (ref 82–98)
NUCLEATED RBC (/100WBC) (OHS): 0 /100 WBC
PLATELET # BLD AUTO: 265 K/UL (ref 150–450)
PMV BLD AUTO: 10.4 FL (ref 9.2–12.9)
POTASSIUM SERPL-SCNC: 4.5 MMOL/L (ref 3.5–5.1)
PROT SERPL-MCNC: 8.4 GM/DL (ref 6–8.4)
RBC # BLD AUTO: 4.97 M/UL (ref 4.6–6.2)
RELATIVE EOSINOPHIL (OHS): 0.6 %
RELATIVE LYMPHOCYTE (OHS): 16.2 % (ref 18–48)
RELATIVE MONOCYTE (OHS): 4.9 % (ref 4–15)
RELATIVE NEUTROPHIL (OHS): 77.7 % (ref 38–73)
SODIUM SERPL-SCNC: 135 MMOL/L (ref 136–145)
WBC # BLD AUTO: 8.97 K/UL (ref 3.9–12.7)

## 2025-08-15 PROCEDURE — 83735 ASSAY OF MAGNESIUM: CPT | Performed by: PHYSICIAN ASSISTANT

## 2025-08-15 PROCEDURE — 63600175 PHARM REV CODE 636 W HCPCS: Performed by: PHYSICIAN ASSISTANT

## 2025-08-15 PROCEDURE — 85025 COMPLETE CBC W/AUTO DIFF WBC: CPT | Performed by: PHYSICIAN ASSISTANT

## 2025-08-15 PROCEDURE — 99284 EMERGENCY DEPT VISIT MOD MDM: CPT | Mod: 25

## 2025-08-15 PROCEDURE — 96360 HYDRATION IV INFUSION INIT: CPT

## 2025-08-15 PROCEDURE — 82040 ASSAY OF SERUM ALBUMIN: CPT | Performed by: PHYSICIAN ASSISTANT

## 2025-08-15 PROCEDURE — 25000003 PHARM REV CODE 250: Performed by: PHYSICIAN ASSISTANT

## 2025-08-15 RX ORDER — CLONAZEPAM 0.5 MG/1
1 TABLET ORAL
Status: COMPLETED | OUTPATIENT
Start: 2025-08-15 | End: 2025-08-15

## 2025-08-15 RX ORDER — DIPHENOXYLATE HYDROCHLORIDE AND ATROPINE SULFATE 2.5; .025 MG/1; MG/1
1 TABLET ORAL 4 TIMES DAILY PRN
Qty: 3 TABLET | Refills: 0 | Status: SHIPPED | OUTPATIENT
Start: 2025-08-15 | End: 2025-08-25

## 2025-08-15 RX ADMIN — SODIUM CHLORIDE, POTASSIUM CHLORIDE, SODIUM LACTATE AND CALCIUM CHLORIDE 1000 ML: 600; 310; 30; 20 INJECTION, SOLUTION INTRAVENOUS at 05:08

## 2025-08-15 RX ADMIN — CLONAZEPAM 1 MG: 0.5 TABLET ORAL at 05:08

## 2025-08-19 ENCOUNTER — PATIENT OUTREACH (OUTPATIENT)
Facility: OTHER | Age: 35
End: 2025-08-19
Payer: COMMERCIAL